# Patient Record
Sex: FEMALE | Race: BLACK OR AFRICAN AMERICAN | Employment: UNEMPLOYED | URBAN - METROPOLITAN AREA
[De-identification: names, ages, dates, MRNs, and addresses within clinical notes are randomized per-mention and may not be internally consistent; named-entity substitution may affect disease eponyms.]

---

## 2018-07-24 PROCEDURE — 02HV33Z INSERTION OF INFUSION DEVICE INTO SUPERIOR VENA CAVA, PERCUTANEOUS APPROACH: ICD-10-PCS | Performed by: EMERGENCY MEDICINE

## 2018-07-24 PROCEDURE — 99285 EMERGENCY DEPT VISIT HI MDM: CPT

## 2018-07-24 RX ORDER — SODIUM CHLORIDE 0.9 % (FLUSH) 0.9 %
5-10 SYRINGE (ML) INJECTION AS NEEDED
Status: DISCONTINUED | OUTPATIENT
Start: 2018-07-24 | End: 2018-07-25 | Stop reason: SDUPTHER

## 2018-07-24 RX ORDER — ACETAMINOPHEN 500 MG
1000 TABLET ORAL ONCE
Status: COMPLETED | OUTPATIENT
Start: 2018-07-24 | End: 2018-07-25

## 2018-07-25 ENCOUNTER — APPOINTMENT (OUTPATIENT)
Dept: CT IMAGING | Age: 73
DRG: 139 | End: 2018-07-25
Attending: EMERGENCY MEDICINE
Payer: MEDICAID

## 2018-07-25 ENCOUNTER — APPOINTMENT (OUTPATIENT)
Dept: GENERAL RADIOLOGY | Age: 73
DRG: 139 | End: 2018-07-25
Attending: EMERGENCY MEDICINE
Payer: MEDICAID

## 2018-07-25 ENCOUNTER — HOSPITAL ENCOUNTER (OUTPATIENT)
Age: 73
Setting detail: OBSERVATION
Discharge: HOME | DRG: 139 | End: 2018-07-28
Attending: EMERGENCY MEDICINE | Admitting: EMERGENCY MEDICINE
Payer: MEDICAID

## 2018-07-25 DIAGNOSIS — A41.9 SEPSIS, DUE TO UNSPECIFIED ORGANISM: Primary | ICD-10-CM

## 2018-07-25 DIAGNOSIS — N32.89 BLADDER WALL THICKENING: ICD-10-CM

## 2018-07-25 DIAGNOSIS — K83.8 PNEUMOBILIA: ICD-10-CM

## 2018-07-25 DIAGNOSIS — K86.9 PANCREATIC LESION: ICD-10-CM

## 2018-07-25 DIAGNOSIS — J18.1 LUNG CONSOLIDATION (HCC): ICD-10-CM

## 2018-07-25 DIAGNOSIS — N13.30 HYDRONEPHROSIS, UNSPECIFIED HYDRONEPHROSIS TYPE: ICD-10-CM

## 2018-07-25 DIAGNOSIS — D50.9 MICROCYTIC ANEMIA: ICD-10-CM

## 2018-07-25 PROBLEM — R56.9 SEIZURE (HCC): Status: ACTIVE | Noted: 2018-07-25

## 2018-07-25 LAB
ALBUMIN SERPL-MCNC: 3.2 G/DL (ref 3.5–5)
ALBUMIN/GLOB SERPL: 0.8 {RATIO} (ref 1.1–2.2)
ALP SERPL-CCNC: 69 U/L (ref 45–117)
ALT SERPL-CCNC: 13 U/L (ref 12–78)
AMPHET UR QL SCN: NEGATIVE
ANION GAP SERPL CALC-SCNC: 10 MMOL/L (ref 5–15)
APPEARANCE UR: CLEAR
AST SERPL-CCNC: 17 U/L (ref 15–37)
ATRIAL RATE: 75 BPM
BARBITURATES UR QL SCN: NEGATIVE
BASOPHILS # BLD: 0 K/UL (ref 0–0.1)
BASOPHILS NFR BLD: 0 % (ref 0–1)
BENZODIAZ UR QL: NEGATIVE
BILIRUB SERPL-MCNC: 0.6 MG/DL (ref 0.2–1)
BILIRUB UR QL: NEGATIVE
BUN SERPL-MCNC: 9 MG/DL (ref 6–20)
BUN/CREAT SERPL: 10 (ref 12–20)
CALCIUM SERPL-MCNC: 8.8 MG/DL (ref 8.5–10.1)
CALCULATED P AXIS, ECG09: 74 DEGREES
CALCULATED R AXIS, ECG10: -31 DEGREES
CALCULATED T AXIS, ECG11: 55 DEGREES
CANNABINOIDS UR QL SCN: NEGATIVE
CHLORIDE SERPL-SCNC: 100 MMOL/L (ref 97–108)
CO2 SERPL-SCNC: 27 MMOL/L (ref 21–32)
COCAINE UR QL SCN: NEGATIVE
COLOR UR: ABNORMAL
CREAT SERPL-MCNC: 0.86 MG/DL (ref 0.55–1.02)
DIAGNOSIS, 93000: NORMAL
DIFFERENTIAL METHOD BLD: ABNORMAL
DRUG SCRN COMMENT,DRGCM: NORMAL
EOSINOPHIL # BLD: 0 K/UL (ref 0–0.4)
EOSINOPHIL NFR BLD: 0 % (ref 0–7)
ERYTHROCYTE [DISTWIDTH] IN BLOOD BY AUTOMATED COUNT: 18.6 % (ref 11.5–14.5)
ETHANOL SERPL-MCNC: <10 MG/DL
FLUAV AG NPH QL IA: NEGATIVE
FLUBV AG NOSE QL IA: NEGATIVE
GLOBULIN SER CALC-MCNC: 3.9 G/DL (ref 2–4)
GLUCOSE SERPL-MCNC: 115 MG/DL (ref 65–100)
GLUCOSE UR STRIP.AUTO-MCNC: NEGATIVE MG/DL
HCT VFR BLD AUTO: 25.7 % (ref 35–47)
HEMOCCULT STL QL: NEGATIVE
HGB BLD-MCNC: 7.7 G/DL (ref 11.5–16)
HGB UR QL STRIP: NEGATIVE
IMM GRANULOCYTES # BLD: 0 K/UL (ref 0–0.04)
IMM GRANULOCYTES NFR BLD AUTO: 0 % (ref 0–0.5)
INR PPP: 1.1 (ref 0.9–1.1)
KETONES UR QL STRIP.AUTO: NEGATIVE MG/DL
LACTATE SERPL-SCNC: 0.9 MMOL/L (ref 0.4–2)
LACTATE SERPL-SCNC: 2.8 MMOL/L (ref 0.4–2)
LEUKOCYTE ESTERASE UR QL STRIP.AUTO: NEGATIVE
LYMPHOCYTES # BLD: 1.1 K/UL (ref 0.8–3.5)
LYMPHOCYTES NFR BLD: 14 % (ref 12–49)
MCH RBC QN AUTO: 20.8 PG (ref 26–34)
MCHC RBC AUTO-ENTMCNC: 30 G/DL (ref 30–36.5)
MCV RBC AUTO: 69.5 FL (ref 80–99)
METHADONE UR QL: NEGATIVE
MONOCYTES # BLD: 0.5 K/UL (ref 0–1)
MONOCYTES NFR BLD: 6 % (ref 5–13)
NEUTS SEG # BLD: 6.5 K/UL (ref 1.8–8)
NEUTS SEG NFR BLD: 80 % (ref 32–75)
NITRITE UR QL STRIP.AUTO: NEGATIVE
NRBC # BLD: 0 K/UL (ref 0–0.01)
NRBC BLD-RTO: 0 PER 100 WBC
OPIATES UR QL: NEGATIVE
P-R INTERVAL, ECG05: 154 MS
PCP UR QL: NEGATIVE
PH UR STRIP: 6.5 [PH] (ref 5–8)
PHENYTOIN SERPL-MCNC: <0.5 UG/ML (ref 10–20)
PLATELET # BLD AUTO: 259 K/UL (ref 150–400)
PMV BLD AUTO: 9 FL (ref 8.9–12.9)
POTASSIUM SERPL-SCNC: 3.3 MMOL/L (ref 3.5–5.1)
PROT SERPL-MCNC: 7.1 G/DL (ref 6.4–8.2)
PROT UR STRIP-MCNC: NEGATIVE MG/DL
PROTHROMBIN TIME: 10.9 SEC (ref 9–11.1)
Q-T INTERVAL, ECG07: 372 MS
QRS DURATION, ECG06: 78 MS
QTC CALCULATION (BEZET), ECG08: 415 MS
RBC # BLD AUTO: 3.7 M/UL (ref 3.8–5.2)
RBC MORPH BLD: ABNORMAL
RBC MORPH BLD: ABNORMAL
SODIUM SERPL-SCNC: 137 MMOL/L (ref 136–145)
SP GR UR REFRACTOMETRY: 1.01 (ref 1–1.03)
UROBILINOGEN UR QL STRIP.AUTO: 2 EU/DL (ref 0.2–1)
VENTRICULAR RATE, ECG03: 75 BPM
WBC # BLD AUTO: 8.1 K/UL (ref 3.6–11)

## 2018-07-25 PROCEDURE — 77030005563 HC CATH URETH INT MMGH -A

## 2018-07-25 PROCEDURE — 96372 THER/PROPH/DIAG INJ SC/IM: CPT

## 2018-07-25 PROCEDURE — 65270000032 HC RM SEMIPRIVATE

## 2018-07-25 PROCEDURE — 74011000250 HC RX REV CODE- 250: Performed by: SURGERY

## 2018-07-25 PROCEDURE — 85025 COMPLETE CBC W/AUTO DIFF WBC: CPT | Performed by: EMERGENCY MEDICINE

## 2018-07-25 PROCEDURE — 96365 THER/PROPH/DIAG IV INF INIT: CPT

## 2018-07-25 PROCEDURE — 80307 DRUG TEST PRSMV CHEM ANLYZR: CPT | Performed by: EMERGENCY MEDICINE

## 2018-07-25 PROCEDURE — 99218 HC RM OBSERVATION: CPT

## 2018-07-25 PROCEDURE — 83605 ASSAY OF LACTIC ACID: CPT | Performed by: EMERGENCY MEDICINE

## 2018-07-25 PROCEDURE — 96366 THER/PROPH/DIAG IV INF ADDON: CPT

## 2018-07-25 PROCEDURE — 74011636320 HC RX REV CODE- 636/320: Performed by: EMERGENCY MEDICINE

## 2018-07-25 PROCEDURE — 87086 URINE CULTURE/COLONY COUNT: CPT | Performed by: EMERGENCY MEDICINE

## 2018-07-25 PROCEDURE — 81003 URINALYSIS AUTO W/O SCOPE: CPT | Performed by: EMERGENCY MEDICINE

## 2018-07-25 PROCEDURE — 93005 ELECTROCARDIOGRAM TRACING: CPT

## 2018-07-25 PROCEDURE — 74011000258 HC RX REV CODE- 258: Performed by: EMERGENCY MEDICINE

## 2018-07-25 PROCEDURE — 74011250636 HC RX REV CODE- 250/636: Performed by: EMERGENCY MEDICINE

## 2018-07-25 PROCEDURE — 74011250637 HC RX REV CODE- 250/637: Performed by: HOSPITALIST

## 2018-07-25 PROCEDURE — 74177 CT ABD & PELVIS W/CONTRAST: CPT

## 2018-07-25 PROCEDURE — 75810000137 HC PLCMT CENT VENOUS CATH

## 2018-07-25 PROCEDURE — 85610 PROTHROMBIN TIME: CPT | Performed by: EMERGENCY MEDICINE

## 2018-07-25 PROCEDURE — 80177 DRUG SCRN QUAN LEVETIRACETAM: CPT | Performed by: EMERGENCY MEDICINE

## 2018-07-25 PROCEDURE — 82272 OCCULT BLD FECES 1-3 TESTS: CPT | Performed by: EMERGENCY MEDICINE

## 2018-07-25 PROCEDURE — 87040 BLOOD CULTURE FOR BACTERIA: CPT | Performed by: EMERGENCY MEDICINE

## 2018-07-25 PROCEDURE — 36556 INSERT NON-TUNNEL CV CATH: CPT

## 2018-07-25 PROCEDURE — C1751 CATH, INF, PER/CENT/MIDLINE: HCPCS

## 2018-07-25 PROCEDURE — 96367 TX/PROPH/DG ADDL SEQ IV INF: CPT

## 2018-07-25 PROCEDURE — 80053 COMPREHEN METABOLIC PANEL: CPT | Performed by: EMERGENCY MEDICINE

## 2018-07-25 PROCEDURE — 71045 X-RAY EXAM CHEST 1 VIEW: CPT

## 2018-07-25 PROCEDURE — 36415 COLL VENOUS BLD VENIPUNCTURE: CPT | Performed by: EMERGENCY MEDICINE

## 2018-07-25 PROCEDURE — 87804 INFLUENZA ASSAY W/OPTIC: CPT | Performed by: EMERGENCY MEDICINE

## 2018-07-25 PROCEDURE — 74011250637 HC RX REV CODE- 250/637: Performed by: EMERGENCY MEDICINE

## 2018-07-25 PROCEDURE — 51701 INSERT BLADDER CATHETER: CPT

## 2018-07-25 PROCEDURE — 70450 CT HEAD/BRAIN W/O DYE: CPT

## 2018-07-25 PROCEDURE — 96361 HYDRATE IV INFUSION ADD-ON: CPT

## 2018-07-25 PROCEDURE — 80339 ANTIEPILEPTICS NOS 1-3: CPT | Performed by: EMERGENCY MEDICINE

## 2018-07-25 PROCEDURE — 80185 ASSAY OF PHENYTOIN TOTAL: CPT | Performed by: EMERGENCY MEDICINE

## 2018-07-25 RX ORDER — LACOSAMIDE 50 MG/1
150 TABLET ORAL 2 TIMES DAILY
Status: DISCONTINUED | OUTPATIENT
Start: 2018-07-25 | End: 2018-07-28 | Stop reason: HOSPADM

## 2018-07-25 RX ORDER — SODIUM CHLORIDE 0.9 % (FLUSH) 0.9 %
5-10 SYRINGE (ML) INJECTION AS NEEDED
Status: DISCONTINUED | OUTPATIENT
Start: 2018-07-25 | End: 2018-07-28 | Stop reason: HOSPADM

## 2018-07-25 RX ORDER — OXYCODONE AND ACETAMINOPHEN 5; 325 MG/1; MG/1
1 TABLET ORAL
Status: DISCONTINUED | OUTPATIENT
Start: 2018-07-25 | End: 2018-07-28 | Stop reason: HOSPADM

## 2018-07-25 RX ORDER — METRONIDAZOLE 500 MG/100ML
500 INJECTION, SOLUTION INTRAVENOUS EVERY 12 HOURS
Status: DISCONTINUED | OUTPATIENT
Start: 2018-07-25 | End: 2018-07-27

## 2018-07-25 RX ORDER — ACETAMINOPHEN 325 MG/1
650 TABLET ORAL
Status: DISCONTINUED | OUTPATIENT
Start: 2018-07-25 | End: 2018-07-28 | Stop reason: HOSPADM

## 2018-07-25 RX ORDER — HEPARIN SODIUM 5000 [USP'U]/ML
5000 INJECTION, SOLUTION INTRAVENOUS; SUBCUTANEOUS EVERY 12 HOURS
Status: DISCONTINUED | OUTPATIENT
Start: 2018-07-25 | End: 2018-07-28 | Stop reason: HOSPADM

## 2018-07-25 RX ORDER — LEVETIRACETAM 500 MG/1
1500 TABLET ORAL 2 TIMES DAILY
Status: DISCONTINUED | OUTPATIENT
Start: 2018-07-25 | End: 2018-07-28 | Stop reason: HOSPADM

## 2018-07-25 RX ORDER — GABAPENTIN 300 MG/1
300 CAPSULE ORAL 3 TIMES DAILY
Status: DISCONTINUED | OUTPATIENT
Start: 2018-07-25 | End: 2018-07-25

## 2018-07-25 RX ORDER — SODIUM CHLORIDE 0.9 % (FLUSH) 0.9 %
5-10 SYRINGE (ML) INJECTION EVERY 8 HOURS
Status: DISCONTINUED | OUTPATIENT
Start: 2018-07-25 | End: 2018-07-28 | Stop reason: HOSPADM

## 2018-07-25 RX ORDER — IBUPROFEN 400 MG/1
800 TABLET ORAL
Status: COMPLETED | OUTPATIENT
Start: 2018-07-25 | End: 2018-07-25

## 2018-07-25 RX ORDER — POTASSIUM CHLORIDE 750 MG/1
40 TABLET, FILM COATED, EXTENDED RELEASE ORAL
Status: COMPLETED | OUTPATIENT
Start: 2018-07-25 | End: 2018-07-25

## 2018-07-25 RX ADMIN — SODIUM CHLORIDE 1890 ML: 900 INJECTION, SOLUTION INTRAVENOUS at 01:54

## 2018-07-25 RX ADMIN — CEFTRIAXONE SODIUM 1 G: 1 INJECTION, POWDER, FOR SOLUTION INTRAMUSCULAR; INTRAVENOUS at 03:15

## 2018-07-25 RX ADMIN — VANCOMYCIN HYDROCHLORIDE 500 MG: 500 INJECTION, POWDER, LYOPHILIZED, FOR SOLUTION INTRAVENOUS at 10:24

## 2018-07-25 RX ADMIN — IOPAMIDOL 100 ML: 755 INJECTION, SOLUTION INTRAVENOUS at 10:00

## 2018-07-25 RX ADMIN — HEPARIN SODIUM 5000 UNITS: 5000 INJECTION INTRAVENOUS; SUBCUTANEOUS at 10:05

## 2018-07-25 RX ADMIN — LEVETIRACETAM 1500 MG: 500 TABLET, FILM COATED ORAL at 10:05

## 2018-07-25 RX ADMIN — LACOSAMIDE 150 MG: 50 TABLET, FILM COATED ORAL at 10:05

## 2018-07-25 RX ADMIN — Medication 10 ML: at 09:03

## 2018-07-25 RX ADMIN — ACETAMINOPHEN 1000 MG: 500 TABLET, FILM COATED ORAL at 00:20

## 2018-07-25 RX ADMIN — HEPARIN SODIUM 5000 UNITS: 5000 INJECTION INTRAVENOUS; SUBCUTANEOUS at 22:00

## 2018-07-25 RX ADMIN — VANCOMYCIN HYDROCHLORIDE 1000 MG: 1 INJECTION, POWDER, LYOPHILIZED, FOR SOLUTION INTRAVENOUS at 06:58

## 2018-07-25 RX ADMIN — Medication 10 ML: at 22:47

## 2018-07-25 RX ADMIN — CEFTRIAXONE SODIUM 1 G: 1 INJECTION, POWDER, FOR SOLUTION INTRAMUSCULAR; INTRAVENOUS at 23:03

## 2018-07-25 RX ADMIN — LACOSAMIDE 150 MG: 50 TABLET, FILM COATED ORAL at 18:05

## 2018-07-25 RX ADMIN — IBUPROFEN 800 MG: 400 TABLET ORAL at 06:08

## 2018-07-25 RX ADMIN — POTASSIUM CHLORIDE 40 MEQ: 750 TABLET, FILM COATED, EXTENDED RELEASE ORAL at 18:05

## 2018-07-25 RX ADMIN — LEVETIRACETAM 1500 MG: 500 TABLET, FILM COATED ORAL at 18:06

## 2018-07-25 RX ADMIN — VANCOMYCIN HYDROCHLORIDE 750 MG: 500 INJECTION, POWDER, LYOPHILIZED, FOR SOLUTION INTRAVENOUS at 23:54

## 2018-07-25 RX ADMIN — Medication 5 ML: at 14:00

## 2018-07-25 RX ADMIN — METRONIDAZOLE 500 MG: 500 INJECTION, SOLUTION INTRAVENOUS at 18:06

## 2018-07-25 NOTE — CONSULTS
Patient seen at request of Dr. Darby Dailey. Information obtained from patient, family, review of chart, Dr. Darby Dailey. Zeny Rodriguez is an 74 y.o. female who was recently admitted with fevers. Ms. Janice Nickerson was in her usual state of health until yesterday when began feeling ill. Febrile to 102.7 in ER. Associated shaking - Ms. Reyes has a h/o seizures and is on Keppra. She reports no abdominal pain. No nausea or vomitting. Denies hematemesis or coffee ground emesis. No diarrhea or blood per rectum. No dysuria, hematuria or pneumaturia. No chest pain or shortness of breath. CT Scan Head - No evidence of acute intracranial abnormality by this modality. Encephalomalacia in the left temporal and parietal lobes. The patient is status post left-sided craniotomy. Periventricular and subcortical white matter hypoattenuation is nonspecific, often associated with chronic microvascular ischemic change. CT Scan Abdomen/Pelvis with po/IV contrast - Bilateral hydroureteronephrosis. The bladder is decompressed, containing a  Cohn catheter. There is thickening of the bladder wall with a few locules of  gas in the bladder. Recommend clinical correlation for cystitis. Air-fluid levels within a heterogeneously attenuating gallbladder fossa. There is chain suture material adjacent to this finding suggesting possible  blind ending loop of bowel. There is pneumobilia. Postsurgical changes in small bowel in the left midabdomen. Moderate hiatal hernia. Fluid in the endometrial canal.  Ms. Carie Lee and her family report no previous abdominal procedures. Lactic Acid was 2.8 on admission and came down to 0.9. Started on Rocephin and Vancomycin. Allergies - Review of patient's allergies indicates no known allergies. Meds - Reviewed.     PMH -   Past Medical History:   Diagnosis Date    Anemia     Arthritis     Asthma     Headache     Seizures ([address]      PSH -   Past Surgical History:   Procedure Laterality Date    NEUROLOGICAL PROCEDURE UNLISTED       Fam Hx - History reviewed. No pertinent family history. Soc Hx -   Social History   Substance Use Topics    Smoking status: Heavy Tobacco Smoker     Packs/day: 0.00     Types: Cigarettes    Smokeless tobacco: Not on file    Alcohol use No     Patient is a well developed, well nourished female who appears uncomfortable. Visit Vitals    /76    Pulse 66    Temp 98.1 °F (36.7 °C)    Resp 16    Ht 5' 4.96\" (1.65 m)    Wt 126 lb 8 oz (57.4 kg)    SpO2 97%    BMI 21.08 kg/m2     HEENT: Anicteric. Neck: Supple without Lymphadenopathy. Cor: RRR. Lungs: Clear to auscultation bilaterally. Abd: Non distended. Soft. Tender to deep palpation in mid abdomen. No guarding or rebound. Ext: No edema. Right femoral central venous catheter.   Neuro: Grossly Non focal.     Labs -   Recent Results (from the past 24 hour(s))   EKG, 12 LEAD, INITIAL    Collection Time: 07/25/18 12:05 AM   Result Value Ref Range    Ventricular Rate 75 BPM    Atrial Rate 75 BPM    P-R Interval 154 ms    QRS Duration 78 ms    Q-T Interval 372 ms    QTC Calculation (Bezet) 415 ms    Calculated P Axis 74 degrees    Calculated R Axis -31 degrees    Calculated T Axis 55 degrees    Diagnosis       Sinus rhythm with marked sinus arrhythmia  Left axis deviation  Low voltage QRS limb leads  T wave abnormality, consider anterior ischemia  No previous ECGs available  Confirmed by Divine Langford ([id number]) on 7/25/2018 9:25:25 AM     LACTIC ACID    Collection Time: 07/25/18 12:13 AM   Result Value Ref Range    Lactic acid 2.8 (HH) 0.4 - 2.0 MMOL/L   DRUG SCREEN, URINE    Collection Time: 07/25/18  2:17 AM   Result Value Ref Range    AMPHETAMINES NEGATIVE  NEG      BARBITURATES NEGATIVE  NEG      BENZODIAZEPINES NEGATIVE  NEG      COCAINE NEGATIVE  NEG      METHADONE NEGATIVE  NEG      OPIATES NEGATIVE  NEG      PCP(PHENCYCLIDINE) NEGATIVE  NEG      THC (TH-CANNABINOL) NEGATIVE  NEG Drug screen comment (NOTE)    URINALYSIS W/ RFLX MICROSCOPIC    Collection Time: 07/25/18  2:17 AM   Result Value Ref Range    Color YELLOW/STRAW      Appearance CLEAR CLEAR      Specific gravity 1.015 1.003 - 1.030      pH (UA) 6.5 5.0 - 8.0      Protein NEGATIVE  NEG mg/dL    Glucose NEGATIVE  NEG mg/dL    Ketone NEGATIVE  NEG mg/dL    Bilirubin NEGATIVE  NEG      Blood NEGATIVE  NEG      Urobilinogen 2.0 (H) 0.2 - 1.0 EU/dL    Nitrites NEGATIVE  NEG      Leukocyte Esterase NEGATIVE  NEG     ETHYL ALCOHOL    Collection Time: 07/25/18  3:14 AM   Result Value Ref Range    ALCOHOL(ETHYL),SERUM <10 <10 MG/DL   PHENYTOIN    Collection Time: 07/25/18  3:14 AM   Result Value Ref Range    Phenytoin <0.5 (L) 10.0 - 20.0 ug/mL   PROTHROMBIN TIME + INR    Collection Time: 07/25/18  3:14 AM   Result Value Ref Range    INR 1.1 0.9 - 1.1      Prothrombin time 10.9 9.0 - 11.1 sec   CBC WITH AUTOMATED DIFF    Collection Time: 07/25/18  3:25 AM   Result Value Ref Range    WBC 8.1 3.6 - 11.0 K/uL    RBC 3.70 (L) 3.80 - 5.20 M/uL    HGB 7.7 (L) 11.5 - 16.0 g/dL    HCT 25.7 (L) 35.0 - 47.0 %    MCV 69.5 (L) 80.0 - 99.0 FL    MCH 20.8 (L) 26.0 - 34.0 PG    MCHC 30.0 30.0 - 36.5 g/dL    RDW 18.6 (H) 11.5 - 14.5 %    PLATELET 801 023 - 080 K/uL    MPV 9.0 8.9 - 12.9 FL    NRBC 0.0 0  WBC    ABSOLUTE NRBC 0.00 0.00 - 0.01 K/uL    NEUTROPHILS 80 (H) 32 - 75 %    LYMPHOCYTES 14 12 - 49 %    MONOCYTES 6 5 - 13 %    EOSINOPHILS 0 0 - 7 %    BASOPHILS 0 0 - 1 %    IMMATURE GRANULOCYTES 0 0.0 - 0.5 %    ABS. NEUTROPHILS 6.5 1.8 - 8.0 K/UL    ABS. LYMPHOCYTES 1.1 0.8 - 3.5 K/UL    ABS. MONOCYTES 0.5 0.0 - 1.0 K/UL    ABS. EOSINOPHILS 0.0 0.0 - 0.4 K/UL    ABS. BASOPHILS 0.0 0.0 - 0.1 K/UL    ABS. IMM.  GRANS. 0.0 0.00 - 0.04 K/UL    DF SMEAR SCANNED      RBC COMMENTS ANISOCYTOSIS  1+        RBC COMMENTS HYPOCHROMIA  1+       METABOLIC PANEL, COMPREHENSIVE    Collection Time: 07/25/18  3:25 AM   Result Value Ref Range    Sodium 137 136 - 145 mmol/L    Potassium 3.3 (L) 3.5 - 5.1 mmol/L    Chloride 100 97 - 108 mmol/L    CO2 27 21 - 32 mmol/L    Anion gap 10 5 - 15 mmol/L    Glucose 115 (H) 65 - 100 mg/dL    BUN 9 6 - 20 MG/DL    Creatinine 0.86 0.55 - 1.02 MG/DL    BUN/Creatinine ratio 10 (L) 12 - 20      GFR est AA >60 >60 ml/min/1.73m2    GFR est non-AA >60 >60 ml/min/1.73m2    Calcium 8.8 8.5 - 10.1 MG/DL    Bilirubin, total 0.6 0.2 - 1.0 MG/DL    ALT (SGPT) 13 12 - 78 U/L    AST (SGOT) 17 15 - 37 U/L    Alk. phosphatase 69 45 - 117 U/L    Protein, total 7.1 6.4 - 8.2 g/dL    Albumin 3.2 (L) 3.5 - 5.0 g/dL    Globulin 3.9 2.0 - 4.0 g/dL    A-G Ratio 0.8 (L) 1.1 - 2.2     OCCULT BLOOD, STOOL    Collection Time: 07/25/18  4:10 AM   Result Value Ref Range    Occult blood, stool NEGATIVE  NEG     LACTIC ACID    Collection Time: 07/25/18  6:14 AM   Result Value Ref Range    Lactic acid 0.9 0.4 - 2.0 MMOL/L   INFLUENZA A & B AG (RAPID TEST)    Collection Time: 07/25/18  7:18 AM   Result Value Ref Range    Influenza A Antigen NEGATIVE  NEG      Influenza B Antigen NEGATIVE  NEG       CT Scans - Reviewed. Imp: Pt. Is a 74 y.o. female with pneumobilia, bilateral hydroureteronephrosis on CT Scan and fevers. Plan: 1. Diet as tolerated. 2. Continue IV abx - Will add Flagyl to Ceftriaxone and Vancomycin. 3. Pneumobilia appears to be from a biliary-enteric anastomosis - patient and family give no h/o previous abdominal procedures. Will check MRCP to better evaluate anatomy. 4. Follow blood and Urine cultures. 5. DVT prophylaxis - SC Heparin as ordered. 6. Plans per Dr. Carlin Number. Following.

## 2018-07-25 NOTE — ED PROVIDER NOTES
EMERGENCY DEPARTMENT HISTORY AND PHYSICAL EXAM      Date: 7/24/2018  Patient Name: Nereyda Covarrubias    History of Presenting Illness     Chief Complaint   Patient presents with    Shaking     X 1 day, per EMS pt's family states the was shivering and is concerned for seizure activity     History Provided By: Patient and EMS    HPI: Nereyda Covarrubias, 74 y.o. female with PMHx significant for seizures, epilepsy, anemia, arthritis, and asthma, presents via EMS to the ED with cc of new onset shaking starting 1 day ago. EMS states that the pt's family reported that the pt had 3 seizures prior to EMS arrive on seen. EMS reports that the pt was actively shaking and moaning upon arrival. EMS states that the pt's family reports that the pt's shaking was a sz. EMS reports that the pt had a body temperature of 109.2 F, and currently presents to the ED with a body temperature of 102.7 F. EMS also reports that the pt's blood sugar was 132 and was SpO2 100% on RA. EMS states that the pt had Pt ambulates with a walker at baseline. Pt endorses taking Kepra. Pt specifically denies coughing. Pt does not present with any other complaints or associated sxs Pt endorses smoking tobacco and does not drink alcohol.     Chief Complaint: shaking  Duration: 1 day ago   Timing:  Acute  Location: general body  Quality: n/a  Severity: N/A  Modifying Factors: n/a  Associated Symptoms: denies any other associated signs or symptoms    HPI is limited due to the pt's minimal verbal status      PCP: Doug Neves MD    Current Facility-Administered Medications   Medication Dose Route Frequency Provider Last Rate Last Dose    sodium chloride (NS) flush 5-10 mL  5-10 mL IntraVENous PRN Anthony Murillo MD        sodium chloride 0.9 % bolus infusion 1,890 mL  30 mL/kg IntraVENous ONCE Anthony Murillo MD        cefTRIAXone (ROCEPHIN) 1 g in 0.9% sodium chloride (MBP/ADV) 50 mL  1 g IntraVENous Q24H Anthony Murillo MD         Current Outpatient Prescriptions   Medication Sig Dispense Refill    levETIRAcetam (KEPPRA) 750 mg tablet TAKE 2 TABLETS BY MOUTH EVERY 12 HOURS FOR 30 DAYS  6    gabapentin (NEURONTIN) 300 mg capsule Take 300 mg by mouth three (3) times daily.  lacosamide (VIMPAT) 150 mg tab tablet Take 150 mg by mouth two (2) times a day.  Walker misc Use to assist with gait 1 Each 0       Past History     Past Medical History:  Past Medical History:   Diagnosis Date    Anemia     Arthritis     Asthma     Headache     Seizures ([address]        Past Surgical History:  Past Surgical History:   Procedure Laterality Date    NEUROLOGICAL PROCEDURE UNLISTED         Family History:  No family history on file. Social History:  Social History   Substance Use Topics    Smoking status: Heavy Tobacco Smoker     Packs/day: 0.00     Types: Cigarettes    Smokeless tobacco: Not on file    Alcohol use No       Allergies:  No Known Allergies      Review of Systems   Review of Systems   Reason unable to perform ROS: pt is minimally verbal        Physical Exam   Physical Exam   Constitutional: She appears well-developed and well-nourished. HENT:   Head: Normocephalic and atraumatic. Eyes: Conjunctivae and EOM are normal.   Neck: Neck supple. Cardiovascular: Normal rate, regular rhythm and intact distal pulses. Pulmonary/Chest: Effort normal. No respiratory distress. Lungs clear anteriorly    Abdominal: Soft. There is no tenderness. Musculoskeletal: Normal range of motion. She exhibits no deformity. Neurological: She is alert. She displays tremor. A&O X2  Tremulous    Skin: Skin is warm and dry. Psychiatric: She has a normal mood and affect. Her behavior is normal. Thought content normal.   Vitals reviewed.       Diagnostic Study Results     Labs -     Recent Results (from the past 12 hour(s))   EKG, 12 LEAD, INITIAL    Collection Time: 07/25/18 12:05 AM   Result Value Ref Range    Ventricular Rate 75 BPM    Atrial Rate 75 BPM P-R Interval 154 ms    QRS Duration 78 ms    Q-T Interval 372 ms    QTC Calculation (Bezet) 415 ms    Calculated P Axis 74 degrees    Calculated R Axis -31 degrees    Calculated T Axis 55 degrees    Diagnosis       Sinus rhythm with marked sinus arrhythmia  Left axis deviation  T wave abnormality, consider anterior ischemia  Abnormal ECG  No previous ECGs available         Radiologic Studies -   CT HEAD WO CONT    (Results Pending)   XR CHEST PORT    (Results Pending)     CT Results  (Last 48 hours)    None        CXR Results  (Last 48 hours)    None            Medical Decision Making   I am the first provider for this patient. I reviewed the vital signs, available nursing notes, past medical history, past surgical history, family history and social history. Vital Signs-Reviewed the patient's vital signs. Patient Vitals for the past 12 hrs:   Temp Pulse Resp BP SpO2   07/24/18 2347 (!) 102.7 °F (39.3 °C) 80 24 118/68 100 %     Pulse Oximetry Analysis - 100% on RA    Cardiac Monitor:   Rate: 80 bpm  Rhythm: Normal Sinus Rhythm      EKG interpretation: (Preliminary)  Rhythm: normal sinus rhythm; and regular . Rate (approx.): 75; Axis: left axis deviation; TN interval: normal; QRS interval; ST/T wave: T wave inverted V1-V4; Other findings: abnormal ekg. No prior available for comparison    Records Reviewed: Nursing Notes and Old Medical Records    Provider Notes (Medical Decision Making):   DDx: recurrent sz, sepsis, uti, dehydration, electrolyte abnormality, urosepsis, pneumonia, intra-abdominal pathology, acute delirium, acute on chronic dementia    ED Course:   Initial assessment performed. The patients presenting problems have been discussed, and they are in agreement with the care plan formulated and outlined with them. I have encouraged them to ask questions as they arise throughout their visit.     Procedure Note - Central Line Placement:   3:05 AM  Performed by: Fiona Santos MD     I called her daughter before the procedure to obtain verbal consent. Patient also agreed. Immediately prior to the procedure, the patient was reevaluated and found suitable for the planned procedure and any planned medications. Immediately prior to the procedure a time out was called to verify the correct patient, procedure, equipment, staff, and marking as appropriate. Area was cleansed with Betadine and anesthetized with 3mLs of 1% lidocaine. Prepped and draped in sterile fashion. Landmarks identified. 16 gauge needle with triple lumen catheter was inserted into pt's Right without ultrasound guidance (bounding pulse palpated). Line sutured in place; sterile dressing applied. Position: Trendelenburg  Number of attempts: 1  Estimated blood loss: 1cc  The procedure took 16-30 minutes, and pt tolerated well. Critical Care Time:   90min    CRITICAL CARE NOTE :    4:51 AM      IMPENDING DETERIORATION -Metabolic    ASSOCIATED RISK FACTORS - Hypotension, Shock, Metabolic changes, Vascular Compromise and CNS Decompensation    MANAGEMENT- Bedside Assessment and Supervision of Care    INTERPRETATION -  Xrays, CT Scan, Blood Pressure and Cardiac Output Measures     INTERVENTIONS - vascular control and Metobolic interventions    CASE REVIEW - Hospitalist, Nursing and Family    TREATMENT RESPONSE -Improved    PERFORMED BY - Self        NOTES   :      I have spent 90 minutes of critical care time involved in lab review, consultations with specialist, family decision- making, bedside attention and documentation. During this entire length of time I was immediately available to the patient . Josh Polo MD      6:22 AM  Extensive admission discussion with Dr. Sarah Yee. CT findings discussed. Unclear source of sepsis at this point. 7:30 AM  Repeat lactate normal. Pt off the floor. Disposition:  admit    PLAN:  1. Current Discharge Medication List        2.    Follow-up Information     None        Return to ED if worse     Diagnosis     Clinical Impression:   1. Sepsis, due to unspecified organism ([address]    2. Lung consolidation ([address]    3. Microcytic anemia    4. Pancreatic lesion    5. Pneumobilia    6. Hydronephrosis, unspecified hydronephrosis type    7. Bladder wall thickening        Attestation: This note is prepared by Doug Ruvalcaba, acting as Scribe for MD Yazan De La Vega MD : The scribe's documentation has been prepared under my direction and personally reviewed by me in its entirety. I confirm that the note above accurately reflects all work, treatment, procedures, and medical decision making performed by me.

## 2018-07-25 NOTE — ED NOTES
Assisted Dr. Autumn Valentin with central line placement. Blood drawn for labs. IV fluids and Rocephin started.

## 2018-07-25 NOTE — IP AVS SNAPSHOT
[facility] 
 
 
 [address] Patient: Lili Santana MRN: [id number] ZANE:6[birth year]72[birth year]32 About your hospitalization You were admitted on:  July 25, 2018 You last received care in the:  [address] You were discharged on:  July 28, 2018 Why you were hospitalized Your primary diagnosis was:  Sepsis (Hcc) Your diagnoses also included:  Seizure (Hcc), Iron Deficiency Anemia Follow-up Information Follow up With Details Comments Contact Info Niki Moran MD On 8/2/2018 Your appointment time is 2:30pm.  [address] [facility] 
[phone] Your Scheduled Appointments Thursday August 02, 2018  2:30 PM EDT TRANSITIONAL CARE MANAGEMENT with Niki Moran MD  
[facility] ([facility]) [address] [place] [address],[address] [place] [id number]  
[phone] Discharge Orders None A check alfred indicates which time of day the medication should be taken. My Medications START taking these medications Instructions Each Dose to Equal  
 Morning Noon Evening Bedtime  
 ferrous sulfate 325 mg (65 mg iron) tablet Commonly known as:  Iron (Ferrous Sulfate) Take 1 Tab by mouth two (2) times a day. 325 mg CONTINUE taking these medications Instructions Each Dose to Equal  
 Morning Noon Evening Bedtime  
 levETIRAcetam 750 mg tablet Commonly known as:  KEPPRA TAKE 2 TABLETS BY MOUTH EVERY 12 HOURS FOR 30 DAYS  
     
  
   
   
  
   
  
 VIMPAT 150 mg Tab tablet Generic drug:  lacosamide Take 150 mg by mouth two (2) times a day. 150 mg Where to Get Your Medications These medications were sent to [address] [employer] [employer] [facility] [address]  [address] [facility], [address] [place] [address] Hours:  24-hours Phone:  [phone]  
  ferrous sulfate 325 mg (65 mg iron) tablet Discharge Instructions Iron Deficiency Anemia: Care Instructions Your Care Instructions Anemia means that you do not have enough red blood cells. Red blood cells carry oxygen around your body. When you have anemia, it can make you pale, weak, and tired. Many things can cause anemia. The most common cause is loss of blood. This can happen if you have heavy menstrual periods. It can also happen if you have bleeding in your stomach or bowel. You can also get anemia if you don't have enough iron in your diet or if it's hard for your body to absorb iron. In some cases, pregnancy causes anemia. That's because a pregnant woman needs more iron. Your doctor may do more tests to find the cause of your anemia. If a disease or other health problem is causing it, your doctor will treat that problem. It's important to follow up with your doctor to make sure that your iron level returns to normal. 
Follow-up care is a key part of your treatment and safety. Be sure to make and go to all appointments, and call your doctor if you are having problems. It's also a good idea to know your test results and keep a list of the medicines you take. How can you care for yourself at home? · If your doctor recommended iron pills, take them as directed. ¨ Try to take the pills on an empty stomach. You can do this about 1 hour before or 2 hours after meals. But you may need to take iron with food to avoid an upset stomach. ¨ Do not take antacids or drink milk or anything with caffeine within 2 hours of when you take your iron. They can keep your body from absorbing the iron well. ¨ Vitamin C helps your body absorb iron.  You may want to take iron pills with a glass of orange juice or some other food high in vitamin C. 
 ¨ Iron pills may cause stomach problems. These include heartburn, nausea, diarrhea, constipation, and cramps. It can help to drink plenty of fluids and include fruits, vegetables, and fiber in your diet. ¨ It's normal for iron pills to make your stool a greenish or grayish black. But internal bleeding can also cause dark stool. So it's important to tell your doctor about any color changes. ¨ Call your doctor if you think you are having a problem with your iron pills. Even after you start to feel better, it will take several months for your body to build up its supply of iron. ¨ If you miss a pill, don't take a double dose. ¨ Keep iron pills out of the reach of small children. Too much iron can be very dangerous. · Eat foods with a lot of iron. These include red meat, shellfish, poultry, and eggs. They also include beans, raisins, whole-grain bread, and leafy green vegetables. · Steam your vegetables. This is the best way to prepare them if you want to get as much iron as possible. · Be safe with medicines. Do not take nonsteroidal anti-inflammatory pain relievers unless your doctor tells you to. These include aspirin, naproxen (Aleve), and ibuprofen (Advil, Motrin). · Liquid iron can stain your teeth. But you can mix it with water or juice and drink it with a straw. Then it won't get on your teeth. When should you call for help? Call 911 anytime you think you may need emergency care. For example, call if: 
  · You passed out (lost consciousness).  
 Call your doctor now or seek immediate medical care if: 
  · You are short of breath.  
  · You are dizzy or light-headed, or you feel like you may faint.  
  · You have new or worse bleeding.  
 Watch closely for changes in your health, and be sure to contact your doctor if: 
  · You feel weaker or more tired than usual.  
  · You do not get better as expected. Where can you learn more? Go to http://katharina-cherry.info/. Enter F797 in the search box to learn more about \"Iron Deficiency Anemia: Care Instructions. \" Current as of: October 9, 2017 Content Version: 11.7 © 3095-7261 [employer]. Care instructions adapted under license by [employer] (which disclaims liability or warranty for this information). If you have questions about a medical condition or this instruction, always ask your healthcare professional. [address] any warranty or liability for your use of this information. Iron-Rich Diet: Care Instructions Your Care Instructions Your body needs iron to make hemoglobin. Hemoglobin is a substance in red blood cells that carries oxygen from the lungs to cells all through your body. If you do not get enough iron, your body makes fewer and smaller red blood cells. As a result, your body's cells may not get enough oxygen. Adult men need 8 milligrams of iron a day; adult women need 18 milligrams of iron a day. After menopause, women need 8 milligrams of iron a day. A pregnant woman needs 27 milligrams of iron a day. Infants and young children have higher iron needs relative to their size than other age groups. People who have lost blood because of ulcers or heavy menstrual periods may become very low in iron and may develop anemia. Most people can get the iron their bodies need by eating enough of certain iron-rich foods. Your doctor may recommend that you take an iron supplement along with eating an iron-rich diet. Follow-up care is a key part of your treatment and safety. Be sure to make and go to all appointments, and call your doctor if you are having problems. It's also a good idea to know your test results and keep a list of the medicines you take. How can you care for yourself at home? · Make iron-rich foods a part of your daily diet. Iron-rich foods include: ¨ All meats, such as chicken, beef, lamb, pork, fish, and shellfish.  Liver is especially high in iron. ¨ Leafy green vegetables. ¨ Raisins, peas, beans, lentils, barley, and eggs. ¨ Iron-fortified breakfast cereals. · Eat foods with vitamin C along with iron-rich foods. Vitamin C helps you absorb more iron from food. Drink a glass of orange juice or another citrus juice with your food. · Eat meat and vegetables or grains together. The iron in meat helps your body absorb the iron in other foods. Where can you learn more? Go to http://katharina-cherry.info/. Enter 0328 [id number] in the search box to learn more about \"Iron-Rich Diet: Care Instructions. \" Current as of: May 12, 2017 Content Version: 11.7 © 8102-3454 "[employer]. Care instructions adapted under license by [employer] (which disclaims liability or warranty for this information). If you have questions about a medical condition or this instruction, always ask your healthcare professional. [place] [address] any warranty or liability for your use of this information. [employer] Announcement We are excited to announce that we are making your provider's discharge notes available to you in [employer]. You will see these notes when they are completed and signed by the physician that discharged you from your recent hospital stay. If you have any questions or concerns about any information you see in [employer], please call the Health Information Department where you were seen or reach out to your Primary Care Provider for more information about your plan of care. Introducing [facility]! [employer] introduces [employer] patient portal. Now you can access parts of your medical record, email your doctor's office, and request medication refills online. 1. In your internet browser, go to [email or web address] 2. Click on the First Time User? Click Here link in the Sign In box. You will see the New Member Sign Up page. 3. Enter your [facility] Access Code exactly as it appears below. You will not need to use this code after youve completed the sign-up process. If you do not sign up before the expiration date, you must request a new code. · [facility] Access Code: [email or web address] Expires: 10/22/2018 11:47 PM 
 
4. Enter the last four digits of your Social Security Number (xxxx) and Date of Birth (mm/dd/yyyy) as indicated and click Submit. You will be taken to the next sign-up page. 5. Create a Desallt ID. This will be your [facility] login ID and cannot be changed, so think of one that is secure and easy to remember. 6. Create a [facility] password. You can change your password at any time. 7. Enter your Password Reset Question and Answer. This can be used at a later time if you forget your password. 8. Enter your e-mail address. You will receive e-mail notification when new information is available in [address] 9. Click Sign Up. You can now view and download portions of your medical record. 10. Click the Download Summary menu link to download a portable copy of your medical information. If you have questions, please visit the Frequently Asked Questions section of the [facility] website. Remember, [facility] is NOT to be used for urgent needs. For medical emergencies, dial 911. Now available from your iPhone and Android! Introducing Tristan Felipe As a [facility] patient, I wanted to make you aware of our electronic visit tool called Tristan Felipe. [facility] 24/7 allows you to connect within minutes with a medical provider 24 hours a day, seven days a week via a mobile device or tablet or logging into a secure website from your computer. You can access Tristan Felipe from anywhere in the [place].  
 
A virtual visit might be right for you when you have a simple condition and feel like you just dont want to get out of bed, or cant get away from work for an appointment, when your regular [facility] provider is not available (evenings, weekends or holidays), or when youre out of town and need minor care. Electronic visits cost only $49 and if the [facility] [facility] 24/7 provider determines a prescription is needed to treat your condition, one can be electronically transmitted to a nearby pharmacy*. Please take a moment to enroll today if you have not already done so. The enrollment process is free and takes just a few minutes. To enroll, please download the [employer]/Viblio víctor to your tablet or phone, or visit [email or web address] to enroll on your computer. And, as an [address] [place] [address] patient with a [employer] account, the results of your visits will be scanned into your electronic medical record and your primary care provider will be able to view the scanned results. We urge you to continue to see your regular [facility] provider for your ongoing medical care. And while your primary care provider may not be the one available when you seek a [facility] virtual visit, the peace of mind you get from getting a real diagnosis real time can be priceless. For more information on [facility], view our Frequently Asked Questions (FAQs) at [email or web address]. Sincerely, 
 
Semja Vickers MD 
Chief Medical Officer Zara Valadez *:  certain medications cannot be prescribed via [facility] Unresulted Labs-Please follow up with your PCP about these lab tests Order Current Status CULTURE, BLOOD Preliminary result CULTURE, BLOOD Preliminary result Providers Seen During Your Hospitalization Provider Specialty Primary office phone Liban Fernandes MD Emergency Medicine [phone] Kushal Hernandez MD Emergency Medicine [phone] Chidi Vuong MD Internal Medicine [phone] Your Primary Care Physician (PCP) Primary Care Physician Office Phone Office Fax [address] [place] [phone] You are allergic to the following No active allergies Recent Documentation Height Weight BMI OB Status Smoking Status 1.626 m 57.4 kg 21.71 kg/m2 Menopause Heavy Tobacco Smoker Emergency Contacts Name Discharge Info Relation Home Work Mobile Paula Reyes DISCHARGE CAREGIVER [3] Daughter [21]   [phone] Patient Belongings The following personal items are in your possession at time of discharge: 
  Dental Appliances: None  Visual Aid: Glasses      Home Medications: None   Jewelry: None  Clothing: Pants, Shirt, Undergarments    Other Valuables: None Please provide this summary of care documentation to your next provider. Signatures-by signing, you are acknowledging that this After Visit Summary has been reviewed with you and you have received a copy. Patient Signature:  ____________________________________________________________ Date:  ____________________________________________________________  
  
[facility] Provider Signature:  ____________________________________________________________ Date:  ____________________________________________________________

## 2018-07-25 NOTE — PROGRESS NOTES
9403) pt arrive to unit. Pt awake, asking for water. Pt not able to answer all admission questions, stated her daughter coming in later today.

## 2018-07-25 NOTE — ED NOTES
Received call from patient's daughters to inform ED staff of contact information. Both daughters, Alexandra Philippe and Cindy Silva, can be reached at [phone]. They also wanted to inform ED provider \"not to give her a central line because she clots\". Dr. Andria Tony made aware.

## 2018-07-25 NOTE — ED NOTES
Pt arrived via EMS with CC of Sx. Pt is hot to touch. Slightly postictal. Airway is patent. No nausea or vomiting at this time. Cap refill is 3 seconds. Emergency Department Nursing Plan of Care       The Nursing Plan of Care is developed from the Nursing assessment and Emergency Department Attending provider initial evaluation. The plan of care may be reviewed in the ED Provider note.     The Plan of Care was developed with the following considerations:   Patient / Family readiness to learn indicated by:verbalized understanding  Persons(s) to be included in education: patient  Barriers to Learning/Limitations:No    Signed     Demarcus Way RN    7/25/2018   12:40 AM

## 2018-07-25 NOTE — PROGRESS NOTES
[facility] Dosing Services: Antimicrobial Stewardship Daily Doc    Consult for antibiotic dosing of vancomycin by Dr. Roxann Jasso  Indication: bacteremia   Day of Therapy 1    Ht Readings from Last 1 Encounters:   07/25/18 165 cm (64.96\")        Wt Readings from Last 1 Encounters:   07/25/18 57.4 kg (126 lb 8 oz)       Vancomycin therapy:  Current maintenance dose: 1000 (mg) every 16 hours (frequency). Dose calculated to approximate a therapeutic trough of 15-20mcg/mL. Last trough level N/A  Plan for level / Adjustment in Therapy:decrease dose for weight & renal function to 750 mg IV Q16H  Dose administration notes:   N/A       Other Antimicrobial   (not dosed by pharmacist) CTX 1 G IV Q24H   Cultures 7/25 Bloodx2: pending  7/25 Urine: pending  7/25 Influenza: A&B (-)   Serum Creatinine Lab Results   Component Value Date/Time    Creatinine 0.86 07/25/2018 03:25 AM         Creatinine Clearance Estimated Creatinine Clearance: 50.8 mL/min (based on Cr of 0.86).      Temp Temp: 98.1 °F (36.7 °C)       WBC Lab Results   Component Value Date/Time    WBC 8.1 07/25/2018 03:25 AM        H/H Lab Results   Component Value Date/Time    HGB 7.7 (L) 07/25/2018 03:25 AM        Platelets    Lab Results   Component Value Date/Time    PLATELET 582 35/29/2837 03:25 AM            Pharmacist Darvin Kahn PHARMD, BCPS  Contact: [phone]

## 2018-07-25 NOTE — PROGRESS NOTES
IDR: Patient admitted sepsis. Patient somewhat poor historian. Flu swab negative. Waiting on Urine Culture. No Medical personnel witness a seizure. Per patient family patient was shaking. Hx of seizure. Dmitri Ang [facility] CM.

## 2018-07-25 NOTE — IP AVS SNAPSHOT
Kourtney [facility]arkins 
 
 
 [address] Patient: Dana Hewitt MRN: [id number] AAO:5/67/[birth year]35 A check alfred indicates which time of day the medication should be taken. My Medications START taking these medications Instructions Each Dose to Equal  
 Morning Noon Evening Bedtime  
 ferrous sulfate 325 mg (65 mg iron) tablet Commonly known as:  Iron (Ferrous Sulfate) Take 1 Tab by mouth two (2) times a day. 325 mg CONTINUE taking these medications Instructions Each Dose to Equal  
 Morning Noon Evening Bedtime  
 levETIRAcetam 750 mg tablet Commonly known as:  KEPPRA TAKE 2 TABLETS BY MOUTH EVERY 12 HOURS FOR 30 DAYS  
     
  
   
   
  
   
  
 VIMPAT 150 mg Tab tablet Generic drug:  lacosamide Take 150 mg by mouth two (2) times a day. 150 mg Where to Get Your Medications These medications were sent to [address] [place] [address] S. [address] [place] [address] [address]. [address] [place] [address], [address] [place], [place] [id number] Hours:  24-hours Phone:  [phone]  
  ferrous sulfate 325 mg (65 mg iron) tablet

## 2018-07-25 NOTE — H&P
Hospitalist Admission Note    NAME: Alexander Leyva   :  1945   MRN:  [id number]     Date/Time:  2018 9:08 AM    Patient PCP: Aiden Cruz MD  ______________________________________________________________________  Given the patient's current clinical presentation, I have a high level of concern for decompensation if discharged from the emergency department. Complex decision making was performed, which includes reviewing the patient's available past medical records, laboratory results, and x-ray films. My assessment of this patient's clinical condition and my plan of care is as follows. Assessment / Plan:    Fever, suspect early pneumonia vs cystitis  Bilateral hydroureteronephrosis  Mild lactic acidosis likely due to above, resolved with fluids  CTA bd/pelvis  No other s/s of sepsis  Sepsis work up initiated  -blood cx  -UA neg, CXR concerning for early pneumonia  -empiric abx  Seizure  -resume home meds  -records from [facility] neurology  Anemia  -stool occult neg  -anemia work up    Code Status: full  Surrogate Decision Maker: daughter    DVT Prophylaxis: lovenox  GI Prophylaxis: not indicated    Baseline: independent      Subjective:   CHIEF COMPLAINT: shaking for one day    HISTORY OF PRESENT ILLNESS:     Alexander Leyva is a 74 y.o. female with PMHx significant for seizures, epilepsy, anemia, arthritis, and asthma was brought to ER last night for above symptoms. Pt very lethargic, unable to provide good history. Information obtained from EMR and daughter. As per records pt was brought in by  EMS with cc of new onset shaking started 1 day ago. EMS states that the pt's family reported that the pt had 3 seizures prior to EMS arrive on seen. EMS reported that the pt was actively shaking and moaning upon arrival. EMS states that the pt's family reports that the pt's shaking was a seizure. Pt was febrile in .7 F.  EMS also reported that the pt's blood sugar was 132 and was SpO2 100% on RA. Initial blood work showed minimally elevated lactic acid that has resolved. Sepsis protocol initiated and pt is admitted. We were asked to admit for work up and evaluation of the above problems. Past Medical History:   Diagnosis Date    Anemia     Arthritis     Asthma     Headache     Seizures ([address]         Past Surgical History:   Procedure Laterality Date    NEUROLOGICAL PROCEDURE UNLISTED         Social History   Substance Use Topics    Smoking status: Heavy Tobacco Smoker     Packs/day: 0.00     Types: Cigarettes    Smokeless tobacco: Not on file    Alcohol use No        family history + htn  No Known Allergies     Prior to Admission medications    Medication Sig Start Date End Date Taking? Authorizing Provider   levETIRAcetam (KEPPRA) 750 mg tablet TAKE 2 TABLETS BY MOUTH EVERY 12 HOURS FOR 30 DAYS 8/29/16  Yes Historical Provider   gabapentin (NEURONTIN) 300 mg capsule Take 300 mg by mouth three (3) times daily. Yes Historical Provider   lacosamide (VIMPAT) 150 mg tab tablet Take 150 mg by mouth two (2) times a day. Yes Historical Provider       REVIEW OF SYSTEMS:     I am not able to complete the review of systems because:    The patient is intubated and sedated    The patient has altered mental status due to his acute medical problems    The patient has baseline aphasia from prior stroke(s)    The patient has baseline dementia and is not reliable historian    The patient is in acute medical distress and unable to provide information           Total of 12 systems reviewed as follows:       POSITIVE= underlined text  Negative = text not underlined  General:  fever, chills, sweats, generalized weakness, weight loss/gain,      loss of appetite   Eyes:    blurred vision, eye pain, loss of vision, double vision  ENT:    rhinorrhea, pharyngitis   Respiratory:   cough, sputum production, SOB, GREENE, wheezing, pleuritic pain   Cardiology:   chest pain, palpitations, orthopnea, PND, edema, syncope   Gastrointestinal:  abdominal pain , N/V, diarrhea, dysphagia, constipation, bleeding   Genitourinary:  frequency, urgency, dysuria, hematuria, incontinence   Muskuloskeletal :  arthralgia, myalgia, back pain  Hematology:  easy bruising, nose or gum bleeding, lymphadenopathy   Dermatological: rash, ulceration, pruritis, color change / jaundice  Endocrine:   hot flashes or polydipsia   Neurological:  headache, dizziness, confusion, focal weakness, paresthesia,     Speech difficulties, memory loss, gait difficulty  Psychological: Feelings of anxiety, depression, agitation    Objective:   VITALS:    Visit Vitals    /60 (BP 1 Location: Right arm, BP Patient Position: At rest)    Pulse 74    Temp 98.1 °F (36.7 °C)    Resp 20    Ht 5' 4.96\" (1.65 m)    Wt 57.4 kg (126 lb 8 oz)    SpO2 100%    BMI 21.08 kg/m2       PHYSICAL EXAM:    General:    Lethargic, wake up on verbal command, no distress, appears stated age. HEENT: Atraumatic, anicteric sclerae, pink conjunctivae     No oral ulcers, mucosa moist, throat clear, dentition fair  Neck:  Supple, symmetrical,  thyroid: non tender  Lungs:   Clear to auscultation bilaterally. No Wheezing or Rhonchi. No rales. Chest wall:  No tenderness  No Accessory muscle use. Heart:   Regular  rhythm,  No  murmur   No edema  Abdomen:   Soft, non-tender. Not distended. Bowel sounds normal  Extremities: No cyanosis. No clubbing,      Skin turgor normal, Capillary refill normal, Radial dial pulse 2+  Skin:     Not pale. Not Jaundiced  No rashes, deformed grown nails feet and hands  Psych:  Not depressed. Not anxious or agitated. Neurologic: EOMs intact. No facial asymmetry. No aphasia or slurred speech. Symmetrical strength, Sensation grossly intact.  Alert and oriented X 4.     _______________________________________________________________________  Care Plan discussed with:    Comments   Patient x    Family      RN x    Care Manager x Consultant:      _______________________________________________________________________  Expected  Disposition:   Home with Family x   HH/PT/OT/RN    SNF/LTC    [facility]AN    ________________________________________________________________________  TOTAL TIME:  79 Minutes    Critical Care Provided     Minutes non procedure based      Comments     Reviewed previous records   >50% of visit spent in counseling and coordination of care  Discussion with patient and/or family and questions answered       ________________________________________________________________________  Signed: Melvin Toro MD    Procedures: see electronic medical records for all procedures/Xrays and details which were not copied into this note but were reviewed prior to creation of Plan.     LAB DATA REVIEWED:    Recent Results (from the past 24 hour(s))   EKG, 12 LEAD, INITIAL    Collection Time: 07/25/18 12:05 AM   Result Value Ref Range    Ventricular Rate 75 BPM    Atrial Rate 75 BPM    P-R Interval 154 ms    QRS Duration 78 ms    Q-T Interval 372 ms    QTC Calculation (Bezet) 415 ms    Calculated P Axis 74 degrees    Calculated R Axis -31 degrees    Calculated T Axis 55 degrees    Diagnosis       Sinus rhythm with marked sinus arrhythmia  Left axis deviation  T wave abnormality, consider anterior ischemia  Abnormal ECG  No previous ECGs available     LACTIC ACID    Collection Time: 07/25/18 12:13 AM   Result Value Ref Range    Lactic acid 2.8 (HH) 0.4 - 2.0 MMOL/L   DRUG SCREEN, URINE    Collection Time: 07/25/18  2:17 AM   Result Value Ref Range    AMPHETAMINES NEGATIVE  NEG      BARBITURATES NEGATIVE  NEG      BENZODIAZEPINES NEGATIVE  NEG      COCAINE NEGATIVE  NEG      METHADONE NEGATIVE  NEG      OPIATES NEGATIVE  NEG      PCP(PHENCYCLIDINE) NEGATIVE  NEG      THC (TH-CANNABINOL) NEGATIVE  NEG      Drug screen comment (NOTE)    URINALYSIS W/ RFLX MICROSCOPIC    Collection Time: 07/25/18  2:17 AM   Result Value Ref Range    Color YELLOW/STRAW      Appearance CLEAR CLEAR      Specific gravity 1.015 1.003 - 1.030      pH (UA) 6.5 5.0 - 8.0      Protein NEGATIVE  NEG mg/dL    Glucose NEGATIVE  NEG mg/dL    Ketone NEGATIVE  NEG mg/dL    Bilirubin NEGATIVE  NEG      Blood NEGATIVE  NEG      Urobilinogen 2.0 (H) 0.2 - 1.0 EU/dL    Nitrites NEGATIVE  NEG      Leukocyte Esterase NEGATIVE  NEG     ETHYL ALCOHOL    Collection Time: 07/25/18  3:14 AM   Result Value Ref Range    ALCOHOL(ETHYL),SERUM <10 <10 MG/DL   PHENYTOIN    Collection Time: 07/25/18  3:14 AM   Result Value Ref Range    Phenytoin <0.5 (L) 10.0 - 20.0 ug/mL   PROTHROMBIN TIME + INR    Collection Time: 07/25/18  3:14 AM   Result Value Ref Range    INR 1.1 0.9 - 1.1      Prothrombin time 10.9 9.0 - 11.1 sec   CBC WITH AUTOMATED DIFF    Collection Time: 07/25/18  3:25 AM   Result Value Ref Range    WBC 8.1 3.6 - 11.0 K/uL    RBC 3.70 (L) 3.80 - 5.20 M/uL    HGB 7.7 (L) 11.5 - 16.0 g/dL    HCT 25.7 (L) 35.0 - 47.0 %    MCV 69.5 (L) 80.0 - 99.0 FL    MCH 20.8 (L) 26.0 - 34.0 PG    MCHC 30.0 30.0 - 36.5 g/dL    RDW 18.6 (H) 11.5 - 14.5 %    PLATELET 918 875 - 287 K/uL    MPV 9.0 8.9 - 12.9 FL    NRBC 0.0 0  WBC    ABSOLUTE NRBC 0.00 0.00 - 0.01 K/uL    NEUTROPHILS 80 (H) 32 - 75 %    LYMPHOCYTES 14 12 - 49 %    MONOCYTES 6 5 - 13 %    EOSINOPHILS 0 0 - 7 %    BASOPHILS 0 0 - 1 %    IMMATURE GRANULOCYTES 0 0.0 - 0.5 %    ABS. NEUTROPHILS 6.5 1.8 - 8.0 K/UL    ABS. LYMPHOCYTES 1.1 0.8 - 3.5 K/UL    ABS. MONOCYTES 0.5 0.0 - 1.0 K/UL    ABS. EOSINOPHILS 0.0 0.0 - 0.4 K/UL    ABS. BASOPHILS 0.0 0.0 - 0.1 K/UL    ABS. IMM.  GRANS. 0.0 0.00 - 0.04 K/UL    DF SMEAR SCANNED      RBC COMMENTS ANISOCYTOSIS  1+        RBC COMMENTS HYPOCHROMIA  1+       METABOLIC PANEL, COMPREHENSIVE    Collection Time: 07/25/18  3:25 AM   Result Value Ref Range    Sodium 137 136 - 145 mmol/L    Potassium 3.3 (L) 3.5 - 5.1 mmol/L    Chloride 100 97 - 108 mmol/L    CO2 27 21 - 32 mmol/L    Anion gap 10 5 - 15 mmol/L    Glucose 115 (H) 65 - 100 mg/dL    BUN 9 6 - 20 MG/DL    Creatinine 0.86 0.55 - 1.02 MG/DL    BUN/Creatinine ratio 10 (L) 12 - 20      GFR est AA >60 >60 ml/min/1.73m2    GFR est non-AA >60 >60 ml/min/1.73m2    Calcium 8.8 8.5 - 10.1 MG/DL    Bilirubin, total 0.6 0.2 - 1.0 MG/DL    ALT (SGPT) 13 12 - 78 U/L    AST (SGOT) 17 15 - 37 U/L    Alk.  phosphatase 69 45 - 117 U/L    Protein, total 7.1 6.4 - 8.2 g/dL    Albumin 3.2 (L) 3.5 - 5.0 g/dL    Globulin 3.9 2.0 - 4.0 g/dL    A-G Ratio 0.8 (L) 1.1 - 2.2     OCCULT BLOOD, STOOL    Collection Time: 07/25/18  4:10 AM   Result Value Ref Range    Occult blood, stool NEGATIVE  NEG     LACTIC ACID    Collection Time: 07/25/18  6:14 AM   Result Value Ref Range    Lactic acid 0.9 0.4 - 2.0 MMOL/L   INFLUENZA A & B AG (RAPID TEST)    Collection Time: 07/25/18  7:18 AM   Result Value Ref Range    Influenza A Antigen NEGATIVE  NEG      Influenza B Antigen NEGATIVE  NEG

## 2018-07-25 NOTE — PROGRESS NOTES
[facility] Admission Pharmacy Medication Reconciliation     Recommendations/Findings:   1) Please interpret this medication list with caution, patient is a poor historian. 2) Removed gabapentin. Total Time Spent: 10 minutes    Information obtained from:, RxQuery    Past Medical History/Disease States:  Past Medical History:   Diagnosis Date    Anemia     Arthritis     Asthma     Headache     Seizures ([address]          Patient allergies: Allergies as of 07/24/2018    (No Known Allergies)         Prior to Admission Medications   Prescriptions Last Dose Informant Patient Reported? Taking?   lacosamide (VIMPAT) 150 mg tab tablet Unknown at Unknown time Other Yes No   Sig: Take 150 mg by mouth two (2) times a day.    levETIRAcetam (KEPPRA) 750 mg tablet Unknown at Unknown time Other Yes No   Sig: TAKE 2 TABLETS BY MOUTH EVERY 12 HOURS FOR 30 DAYS      Facility-Administered Medications: None            Thank you,    Kyle Kulkarni, ROMELD, BCPS  Contact: [phone]

## 2018-07-25 NOTE — ED NOTES
Report given from night shift rn, jemma, and care assumed of pt. Pt repositioned in bed and ibarra catheter emptied. Pt reported that she was feeling \"a little better. \" updated on plan of care. Eric Fulton calling report to inpt unit at present. No si/s of acute distress. Call bell within reach.

## 2018-07-25 NOTE — ED NOTES
Multiple attempts at IV including attempts in the left and right EJ and by ultrasound without success.

## 2018-07-25 NOTE — PROGRESS NOTES
Pharmacy Dosing Services  Automatic adjustment of metronidazole  Prescriber: Randee Calloway  Indication: Sepsis  Day of therapy: 1    Order changed to metronidazole 500 mg IV every 12 hours. Intervention   1. Pharmacy will automatically change the dose of metronidazole to 500 mg every 12 hours for all indications except for the following:   a. C. difficile infection   b. CNS infections   c. Non-anaerobic infections (giardiasis, trichomonas, H pylori, etc)       Thank you,  Jose Bergeron, PharmD, BCPS  [phone]    Background (policy #[id number]): Metronidazole exhibits rapid concentration-dependent bactericidal activity similar to that of aminoglycosides and fluoroquinolones. To optimize bactericidal activity, dosing should aim to maximize peak concentrations (ideally 8 to 10 times the KIMBER). In addition, metronidazole displays a moderate post antibiotic effect which allows for continued killing of bacteria even when serum and tissue levels drop below the KIMBER of the infecting pathogen. Metronidazole is extensively distributed into various tissues and body fluids. It is hepatically metabolized to an active hydroxy-metabolite (exhibiting 65% activity of the parent drug) and a small percent to an inactive acid metabolite. The combined parent drug and active hydroxyl-metabolite have demonstrated additive or partial synergistic activity. The half-life of metronidazole is approximately 8 hours in normal renal function and the half-life of the active metabolite ranges from 8-19 hours. Metronidazole dosed at 500mg every 12 hours obtains peak serum levels of approximately 23ug/ml (more than 10 times the KIMBER for B. fragilis [1-2ug/ml]) and trough levels of about 7 ug/ml. Based on these pharmacokinetic and pharmacodynamic properties, dosing of metronidazole every 12 or every 24 hours is appropriate for the treatment of anaerobic infections.    For the treatment of C. difficile infection and non-anaerobic infections (e.g. giardiasis, amebiesis, trichomonas, H. pylori and Crohn's disease) the traditional dosing interval is appropriate.

## 2018-07-26 LAB
ANION GAP SERPL CALC-SCNC: 7 MMOL/L (ref 5–15)
BACTERIA SPEC CULT: NORMAL
BUN SERPL-MCNC: 6 MG/DL (ref 6–20)
BUN/CREAT SERPL: 8 (ref 12–20)
CALCIUM SERPL-MCNC: 8.2 MG/DL (ref 8.5–10.1)
CC UR VC: NORMAL
CHLORIDE SERPL-SCNC: 107 MMOL/L (ref 97–108)
CO2 SERPL-SCNC: 27 MMOL/L (ref 21–32)
CREAT SERPL-MCNC: 0.71 MG/DL (ref 0.55–1.02)
FERRITIN SERPL-MCNC: 9 NG/ML (ref 8–252)
GLUCOSE BLD STRIP.AUTO-MCNC: 117 MG/DL (ref 65–100)
GLUCOSE SERPL-MCNC: 96 MG/DL (ref 65–100)
HCT VFR BLD AUTO: 23.6 % (ref 35–47)
HGB BLD-MCNC: 7.1 G/DL (ref 11.5–16)
IRON SATN MFR SERPL: 7 % (ref 20–50)
IRON SERPL-MCNC: 23 UG/DL (ref 35–150)
LACOSAMIDE SERPL-MCNC: 5.1 UG/ML (ref 5–10)
POTASSIUM SERPL-SCNC: 3.3 MMOL/L (ref 3.5–5.1)
SERVICE CMNT-IMP: ABNORMAL
SERVICE CMNT-IMP: NORMAL
SODIUM SERPL-SCNC: 141 MMOL/L (ref 136–145)
TIBC SERPL-MCNC: 328 UG/DL (ref 250–450)

## 2018-07-26 PROCEDURE — 65270000032 HC RM SEMIPRIVATE

## 2018-07-26 PROCEDURE — 96366 THER/PROPH/DIAG IV INF ADDON: CPT

## 2018-07-26 PROCEDURE — 74011250637 HC RX REV CODE- 250/637: Performed by: HOSPITALIST

## 2018-07-26 PROCEDURE — 96372 THER/PROPH/DIAG INJ SC/IM: CPT

## 2018-07-26 PROCEDURE — 74011000258 HC RX REV CODE- 258: Performed by: EMERGENCY MEDICINE

## 2018-07-26 PROCEDURE — 36415 COLL VENOUS BLD VENIPUNCTURE: CPT | Performed by: HOSPITALIST

## 2018-07-26 PROCEDURE — 74011250636 HC RX REV CODE- 250/636: Performed by: EMERGENCY MEDICINE

## 2018-07-26 PROCEDURE — 74011000250 HC RX REV CODE- 250: Performed by: SURGERY

## 2018-07-26 PROCEDURE — 83540 ASSAY OF IRON: CPT | Performed by: HOSPITALIST

## 2018-07-26 PROCEDURE — 85018 HEMOGLOBIN: CPT | Performed by: HOSPITALIST

## 2018-07-26 PROCEDURE — 74011250637 HC RX REV CODE- 250/637: Performed by: EMERGENCY MEDICINE

## 2018-07-26 PROCEDURE — 99218 HC RM OBSERVATION: CPT

## 2018-07-26 PROCEDURE — 80048 BASIC METABOLIC PNL TOTAL CA: CPT | Performed by: HOSPITALIST

## 2018-07-26 PROCEDURE — 82728 ASSAY OF FERRITIN: CPT | Performed by: HOSPITALIST

## 2018-07-26 PROCEDURE — 82962 GLUCOSE BLOOD TEST: CPT

## 2018-07-26 RX ORDER — POTASSIUM CHLORIDE 750 MG/1
40 TABLET, FILM COATED, EXTENDED RELEASE ORAL
Status: COMPLETED | OUTPATIENT
Start: 2018-07-26 | End: 2018-07-26

## 2018-07-26 RX ADMIN — LACOSAMIDE 150 MG: 50 TABLET, FILM COATED ORAL at 08:15

## 2018-07-26 RX ADMIN — LACOSAMIDE 150 MG: 50 TABLET, FILM COATED ORAL at 17:25

## 2018-07-26 RX ADMIN — LEVETIRACETAM 1500 MG: 500 TABLET, FILM COATED ORAL at 19:30

## 2018-07-26 RX ADMIN — METRONIDAZOLE 500 MG: 500 INJECTION, SOLUTION INTRAVENOUS at 22:05

## 2018-07-26 RX ADMIN — HEPARIN SODIUM 5000 UNITS: 5000 INJECTION INTRAVENOUS; SUBCUTANEOUS at 22:06

## 2018-07-26 RX ADMIN — METRONIDAZOLE 500 MG: 500 INJECTION, SOLUTION INTRAVENOUS at 08:15

## 2018-07-26 RX ADMIN — LEVETIRACETAM 1500 MG: 500 TABLET, FILM COATED ORAL at 08:15

## 2018-07-26 RX ADMIN — VANCOMYCIN HYDROCHLORIDE 750 MG: 500 INJECTION, POWDER, LYOPHILIZED, FOR SOLUTION INTRAVENOUS at 15:00

## 2018-07-26 RX ADMIN — POTASSIUM CHLORIDE 40 MEQ: 750 TABLET, FILM COATED, EXTENDED RELEASE ORAL at 08:15

## 2018-07-26 RX ADMIN — Medication 10 ML: at 06:24

## 2018-07-26 RX ADMIN — Medication 10 ML: at 22:06

## 2018-07-26 RX ADMIN — HEPARIN SODIUM 5000 UNITS: 5000 INJECTION INTRAVENOUS; SUBCUTANEOUS at 08:15

## 2018-07-26 RX ADMIN — CEFTRIAXONE SODIUM 1 G: 1 INJECTION, POWDER, FOR SOLUTION INTRAMUSCULAR; INTRAVENOUS at 23:22

## 2018-07-26 RX ADMIN — Medication 10 ML: at 15:01

## 2018-07-26 NOTE — PROGRESS NOTES
Pt without complaints this shift , no seizure activity noted . Tolerate all meds  As prescribed . 0700 Bedside shift change report given to [id number] [place] [address]  (oncoming nurse) by me (offgoing nurse). Report given with SBAR, MAR and Recent Results.

## 2018-07-26 NOTE — PROGRESS NOTES
Bedside report received from [facility]  and care assumed. Report given with SBAR , recent labs, and MAR .   0630 Pt remains afebrile . oob to bathroom with minimal assistance . No complaints offered this shift .

## 2018-07-26 NOTE — PROGRESS NOTES
Reason for Admission: Sepsis  RRAT Score: 4  Plan for utilizing home health: Not at this time. Follow for on going assessment. Likelihood of Readmission: Low  Transition of Care Plan: CM reviewed chart. Patient verified name, address, and daughter new number. Patient somewhat poor historian state ask my daughter she will tell you. Patient and daughter share the same name Sveta Elam [phone]. Patient lives with daughter in apartment. Patient states she has a cane and walker at home. Patient insured with [employer] medicaid. Patient cant remember if she has Medicare. Dmitri Ang [facility] CM.

## 2018-07-26 NOTE — PROGRESS NOTES
Hospitalist Progress Note    NAME: Lauren Garcia   :  1945   MRN:  [id number]       Assessment / Plan:      Fever, suspect early pneumonia vs cystitis  Bilateral hydroureteronephrosis, likely chronic  Mild lactic acidosis likely due to above, resolved with fluids  CT Abd/pelvis Bilateral hydroureteronephrosis. The bladder is decompressed, containing a Cohn catheter. There is thickening of the bladder wall with a few locules of  gas in the bladder. Recommend clinical correlation for cystitis. Air-fluid levels within a heterogeneously attenuating gallbladder fossa. There is chain suture material adjacent to this finding suggesting possible blind ending loop of bowel. There is pneumobilia. Postsurgical changes in small bowel in the left midabdomen. Moderate hiatal hernia Fluid in the endometrial canal.  No other s/s of sepsis  Sepsis work up initiated  -blood cx neg so far  -UA neg, CXR concerning for early pneumonia  -empiric abx day 2  -appreciate surgery consult, plan was to do MRCP, could not get done   Seizure  -resume home meds  -records from [facility] neurology  Anemia  -stool occult neg  -anemia work up     Code Status: full  Surrogate Decision Maker: daughter     DVT Prophylaxis: lovenox  GI Prophylaxis: not indicated     Baseline: independent     Subjective:     Chief Complaint / Reason for Physician Visit  \"feeling better\". Discussed with RN events overnight. Review of Systems:  Symptom Y/N Comments  Symptom Y/N Comments   Fever/Chills y   Chest Pain n    Poor Appetite y   Edema n    Cough n   Abdominal Pain n    Sputum n   Joint Pain n    SOB/GREENE n   Pruritis/Rash     Nausea/vomit n   Tolerating PT/OT     Diarrhea    Tolerating Diet y    Constipation    Other       Could NOT obtain due to:      Objective:     VITALS:   Last 24hrs VS reviewed since prior progress note.  Most recent are:  Patient Vitals for the past 24 hrs:   Temp Pulse Resp BP SpO2   18 1507 98.4 °F (36.9 °C) 98 18 115/72 99 %   07/26/18 1130 98.1 °F (36.7 °C) 98 18 124/75 99 %   07/26/18 0733 98 °F (36.7 °C) 100 16 127/68 99 %   07/26/18 0413 98.3 °F (36.8 °C) 72 16 114/61 100 %   07/26/18 0012 98.4 °F (36.9 °C) 78 16 106/65 100 %   07/25/18 1930 98.3 °F (36.8 °C) 74 20 108/56 96 %       Intake/Output Summary (Last 24 hours) at 07/26/18 1733  Last data filed at 07/26/18 1707   Gross per 24 hour   Intake             1440 ml   Output             2575 ml   Net            -1135 ml        PHYSICAL EXAM:  General: Awake, Alert, cooperative, no acute distress    EENT:  EOMI. Anicteric sclerae. MMM  Resp:  CTA bilaterally, no wheezing or rales. No accessory muscle use  CV:  Regular  rhythm,  No edema  GI:  Soft, Non distended, Non tender.  +Bowel sounds  Neurologic:  Alert and oriented X 3, normal speech,   Psych:   Not anxious nor agitated  Skin:  No rashes. No jaundice    Reviewed most current lab test results and cultures  YES  Reviewed most current radiology test results   YES  Review and summation of old records today    NO  Reviewed patient's current orders and MAR    YES  PMH/ reviewed - no change compared to H&P  ________________________________________________________________________  Care Plan discussed with:    Comments   Patient x    Family      RN     Care Manager     Consultant  x surgery                     Multidiciplinary team rounds were held today with , nursing, pharmacist and clinical coordinator. Patient's plan of care was discussed; medications were reviewed and discharge planning was addressed.      ________________________________________________________________________  Total NON critical care TIME: 25   Minutes    Total CRITICAL CARE TIME Spent:   Minutes non procedure based      Comments   >50% of visit spent in counseling and coordination of care     ________________________________________________________________________  Kimberli Velasco MD     Procedures: see electronic medical records for all procedures/Xrays and details which were not copied into this note but were reviewed prior to creation of Plan. LABS:  I reviewed today's most current labs and imaging studies.   Pertinent labs include:  Recent Labs      07/26/18 0405 07/25/18 0325   WBC   --   8.1   HGB  7.1*  7.7*   HCT  23.6*  25.7*   PLT   --   259     Recent Labs      07/26/18 0405 07/25/18 0325 07/25/18 0314   NA  141  137   --    K  3.3*  3.3*   --    CL  107  100   --    CO2  27  27   --    GLU  96  115*   --    BUN  6  9   --    CREA  0.71  0.86   --    CA  8.2*  8.8   --    ALB   --   3.2*   --    TBILI   --   0.6   --    SGOT   --   17   --    ALT   --   13   --    INR   --    --   1.1       Signed: Shane Oro MD

## 2018-07-26 NOTE — PROGRESS NOTES
Ms. Baldev Hoffman feels better today. Tm 100.7 Tc 98 HR: 100 BP: 127/68 Resp Rate: 16 99% sat on room air. Intake/Output Summary (Last 24 hours) at 07/26/18 1255  Last data filed at 07/26/18 0556   Gross per 24 hour   Intake              790 ml   Output             2075 ml   Net            -1285 ml   Exam: Cor: RRR. Lungs: Bilateral breath sounds. Clear to auscultation. Abd: Soft. Non distended. Non tender. No guarding or rebound. Labs:   Recent Results (from the past 12 hour(s))   FERRITIN    Collection Time: 07/26/18  4:05 AM   Result Value Ref Range    Ferritin 9 8 - 252 NG/ML   IRON PROFILE    Collection Time: 07/26/18  4:05 AM   Result Value Ref Range    Iron 23 (L) 35 - 150 ug/dL    TIBC 328 250 - 450 ug/dL    Iron % saturation 7 (L) 20 - 50 %   HGB & HCT    Collection Time: 07/26/18  4:05 AM   Result Value Ref Range    HGB 7.1 (L) 11.5 - 16.0 g/dL    HCT 23.6 (L) 35.0 - 85.7 %   METABOLIC PANEL, BASIC    Collection Time: 07/26/18  4:05 AM   Result Value Ref Range    Sodium 141 136 - 145 mmol/L    Potassium 3.3 (L) 3.5 - 5.1 mmol/L    Chloride 107 97 - 108 mmol/L    CO2 27 21 - 32 mmol/L    Anion gap 7 5 - 15 mmol/L    Glucose 96 65 - 100 mg/dL    BUN 6 6 - 20 MG/DL    Creatinine 0.71 0.55 - 1.02 MG/DL    BUN/Creatinine ratio 8 (L) 12 - 20      GFR est AA >60 >60 ml/min/1.73m2    GFR est non-AA >60 >60 ml/min/1.73m2    Calcium 8.2 (L) 8.5 - 10.1 MG/DL   Unable to have MRCP due to possibility of aneurysm clips. Will hold off on further abdominal imaging for now. Diet as tolerated. Continue IV abx for now - Rocephin, Flagyl, Vancomycin. Remove ibarra. Plans per Dr. Montes De Oca Hemp. Following.

## 2018-07-27 LAB
HCT VFR BLD AUTO: 24 % (ref 35–47)
HGB BLD-MCNC: 7.1 G/DL (ref 11.5–16)
LEVETIRACETAM SERPL-MCNC: 16.4 UG/ML (ref 10–40)
POTASSIUM SERPL-SCNC: 3.5 MMOL/L (ref 3.5–5.1)
VANCOMYCIN SERPL-MCNC: 9.3 UG/ML

## 2018-07-27 PROCEDURE — 97116 GAIT TRAINING THERAPY: CPT | Performed by: PHYSICAL THERAPIST

## 2018-07-27 PROCEDURE — G8980 MOBILITY D/C STATUS: HCPCS | Performed by: PHYSICAL THERAPIST

## 2018-07-27 PROCEDURE — 74011250637 HC RX REV CODE- 250/637: Performed by: EMERGENCY MEDICINE

## 2018-07-27 PROCEDURE — 96366 THER/PROPH/DIAG IV INF ADDON: CPT

## 2018-07-27 PROCEDURE — 96372 THER/PROPH/DIAG INJ SC/IM: CPT

## 2018-07-27 PROCEDURE — 65270000032 HC RM SEMIPRIVATE

## 2018-07-27 PROCEDURE — G8979 MOBILITY GOAL STATUS: HCPCS | Performed by: PHYSICAL THERAPIST

## 2018-07-27 PROCEDURE — 97161 PT EVAL LOW COMPLEX 20 MIN: CPT | Performed by: PHYSICAL THERAPIST

## 2018-07-27 PROCEDURE — 80202 ASSAY OF VANCOMYCIN: CPT

## 2018-07-27 PROCEDURE — 85018 HEMOGLOBIN: CPT | Performed by: HOSPITALIST

## 2018-07-27 PROCEDURE — 74011250636 HC RX REV CODE- 250/636: Performed by: EMERGENCY MEDICINE

## 2018-07-27 PROCEDURE — 84132 ASSAY OF SERUM POTASSIUM: CPT | Performed by: HOSPITALIST

## 2018-07-27 PROCEDURE — G8978 MOBILITY CURRENT STATUS: HCPCS | Performed by: PHYSICAL THERAPIST

## 2018-07-27 PROCEDURE — 36415 COLL VENOUS BLD VENIPUNCTURE: CPT | Performed by: HOSPITALIST

## 2018-07-27 PROCEDURE — 99218 HC RM OBSERVATION: CPT

## 2018-07-27 PROCEDURE — 74011000250 HC RX REV CODE- 250: Performed by: SURGERY

## 2018-07-27 RX ADMIN — Medication 10 ML: at 16:03

## 2018-07-27 RX ADMIN — Medication 10 ML: at 06:08

## 2018-07-27 RX ADMIN — Medication 10 ML: at 21:51

## 2018-07-27 RX ADMIN — METRONIDAZOLE 500 MG: 500 INJECTION, SOLUTION INTRAVENOUS at 08:38

## 2018-07-27 RX ADMIN — VANCOMYCIN HYDROCHLORIDE 750 MG: 500 INJECTION, POWDER, LYOPHILIZED, FOR SOLUTION INTRAVENOUS at 08:38

## 2018-07-27 RX ADMIN — LEVETIRACETAM 1500 MG: 500 TABLET, FILM COATED ORAL at 17:25

## 2018-07-27 RX ADMIN — LACOSAMIDE 150 MG: 50 TABLET, FILM COATED ORAL at 08:38

## 2018-07-27 RX ADMIN — Medication 10 ML: at 16:04

## 2018-07-27 RX ADMIN — HEPARIN SODIUM 5000 UNITS: 5000 INJECTION INTRAVENOUS; SUBCUTANEOUS at 21:51

## 2018-07-27 RX ADMIN — Medication 10 ML: at 16:02

## 2018-07-27 RX ADMIN — LACOSAMIDE 150 MG: 50 TABLET, FILM COATED ORAL at 17:25

## 2018-07-27 RX ADMIN — LEVETIRACETAM 1500 MG: 500 TABLET, FILM COATED ORAL at 11:54

## 2018-07-27 RX ADMIN — HEPARIN SODIUM 5000 UNITS: 5000 INJECTION INTRAVENOUS; SUBCUTANEOUS at 08:39

## 2018-07-27 NOTE — PROGRESS NOTES
IDR: Patient ibarra discharged yesterday. Patient did void throughout the night per night shift. MRI cancel patient daughter confirm patient has clips in head. PT/OT consult possible discharged 1-2 days. Dmitri GONZALEZ KAYLEIGH.

## 2018-07-27 NOTE — PROGRESS NOTES
Bedside report received from [facility]  and care assumed. Report given with SBAR , recent labs  and MAR .   0300, frightened by thunder ,offered recliner to sit at nurses station till storm over emotional support given . Helen Lyons 0345 pt back in room . 0700 Bedside shift change report given to [facility]  (oncoming nurse) by me (offgoing nurse). Report given with SBAR, MAR and Recent Results.

## 2018-07-27 NOTE — PROGRESS NOTES
physical Therapy EVALUATION/DISCHARGE  Patient: Chapito Goodman (67 y.o. female)  Date: 7/27/2018  Primary Diagnosis: Sepsis ([facility] [address]        Precautions:      ASSESSMENT :  Based on the objective data described below, the patient presents at baseline for mobility. Patient sitting out in chair at start of session. Patient independent with sit to stand transfer. Patient uses a rolling walker as needed for ambulation at home. Patient with good family support. Patient ambulated 300 feet without assistive device. Stand-by assistance provided secondary for mild unsteadiness. Patient scored a 49/56 on MEDRANO balance outcome measure which does not denote increased risk for falls. Left rolling walker in room for ambulation as needed. Skilled acute care physical therapy is not indicated at this time. PLAN :  Discharge Recommendations: Home Health if desired  Further Equipment Recommendations for Discharge: None; patient has rolling walker at home     SUBJECTIVE:   Patient stated I go on walks with my daughters a lot.     OBJECTIVE DATA SUMMARY:   HISTORY:    Past Medical History:   Diagnosis Date    Anemia     Arthritis     Asthma     Headache     Seizures ([facility] [address]      Past Surgical History:   Procedure Laterality Date    NEUROLOGICAL PROCEDURE UNLISTED       Prior Level of Function/Home Situation: Patient resides in a one story home with her daughters. No steps to enter. Patient has a rolling walker at home which she uses as needed. Independent with ADLs. Goes to grocery store and on walks with daughters in the community.    Personal factors and/or comorbidities impacting plan of care:     Home Situation  Home Environment: Other (comment) (not sure)  One/Two Story Residence: One story  Living Alone: No  Support Systems: Child(yordan)  Patient Expects to be Discharged to[de-identified] Unknown  Current DME Used/Available at Home: Walker    EXAMINATION/PRESENTATION/DECISION MAKING:   Critical Behavior:  Neurologic State: Alert, Appropriate for age  Orientation Level: Oriented to person, Oriented to place, Oriented to situation  Cognition: Follows commands, Appropriate safety awareness     Hearing: Auditory  Auditory Impairment: None    Range Of Motion:  AROM: Within functional limits  PROM: Within functional limits     Strength:    Strength: Generally decreased, functional (LLE weaker than right)     Functional Mobility:  Bed Mobility:   Patient sitting out of bed in chair. Transfers:  Sit to Stand: Independent  Stand to Sit: Independent     Balance:   Sitting: Intact  Standing: Impaired; Without support  Standing - Static: Good  Standing - Dynamic : Fair     Ambulation/Gait Training:  Distance (ft): 300 Feet (ft)  Assistive Device: Gait belt  Ambulation - Level of Assistance: Stand-by assistance  Gait Description (WDL): Exceptions to WDL  Gait Abnormalities: Decreased step clearance  Base of Support: Narrowed  Speed/Cee: Pace decreased (<100 feet/min)  Step Length: Right shortened;Left shortened       Therapeutic Exercises:   Patient instructed on     Functional Measure:  Bingham Balance Test:    Sitting to Standin  Standing Unsupported: 4  Sitting with Back Unsupported: 4  Standing to Sittin  Transfers: 4  Standing Unsupported with Eyes Closed: 4  Standing Unsupported with Feet Together: 4  Reach Forward with Outstretched Arm: 4   Object: 4  Turn to Look Over Shoulders: 4  Turn 360 Degrees: 2  Alternate Foot on Step/Stool: 3  Standing Unsupported One Foot in Front: 2  Stand on One Le  Total: 49         56=Maximum possible score;   0-20=High fall risk  21-40=Moderate fall risk   41-56=Low fall risk     Bingham Balance Test and G-code impairment scale:  Percentage of Impairment CH    0%   CI    1-19% CJ    20-39% CK    40-59% CL    60-79% CM    80-99% CN     100%   Bingham   Score 0-56 56 45-55 34-44 23-33 12-22 1-11 0         G codes:   In compliance with CMSs Claims Based Outcome Reporting, the following G-code set was chosen for this patient based on their primary functional limitation being treated: The outcome measure chosen to determine the severity of the functional limitation was the MEDRANO with a score of 49/56 which was correlated with the impairment scale. ? Mobility - Walking and Moving Around:     - CURRENT STATUS: CI - 1%-19% impaired, limited or restricted    - GOAL STATUS: CI - 1%-19% impaired, limited or restricted    - D/C STATUS: CI - 1%-19% impaired, limited or restricted       Based on the above components, the patient evaluation is determined to be of the following complexity level: LOW     Pain:  Pain Scale 1: Numeric (0 - 10)  Pain Intensity 1: 0     Activity Tolerance:   Good  Please refer to the flowsheet for vital signs taken during this treatment. After treatment:   [x]   Patient left in no apparent distress sitting up in chair  []   Patient left in no apparent distress in bed  [x]   Call bell left within reach  [x]   Nursing notified  []   Caregiver present  []   Bed alarm activated    COMMUNICATION/EDUCATION:   Communication/Collaboration:  [x]   Fall prevention education was provided and the patient/caregiver indicated understanding. [x]   Patient/family have participated as able and agree with findings and recommendations. []   Patient is unable to participate in plan of care at this time.   Findings and recommendations were discussed with: Registered Nurse    Thank you for this referral.  Neli Tineo, PT   Time Calculation: 25 mins

## 2018-07-27 NOTE — PROGRESS NOTES
Hospitalist Progress Note    NAME: Daphnie Veliz   :  1945   MRN:  [id number]       Assessment / Plan:      Fever, suspect early pneumonia vs cystitis  Bilateral hydroureteronephrosis, likely chronic  Mild lactic acidosis likely due to above, resolved with fluids  CT Abd/pelvis Bilateral hydroureteronephrosis. The bladder is decompressed, containing a Cohn catheter. There is thickening of the bladder wall with a few locules of  gas in the bladder. Recommend clinical correlation for cystitis. Air-fluid levels within a heterogeneously attenuating gallbladder fossa. There is chain suture material adjacent to this finding suggesting possible blind ending loop of bowel. There is pneumobilia. Postsurgical changes in small bowel in the left midabdomen. Moderate hiatal hernia Fluid in the endometrial canal.  No other s/s of sepsis  Sepsis work up initiated  -blood cx neg so far  -UA neg, CXR concerning for early pneumonia  -empiric abx day 3  -appreciate surgery consult, plan was to do MRCP, could not get done because unable to clarify if pt has staples for brain aneurysm repair in past    Seizure  -resume home meds    Anemia  -stool occult neg  -anemia work up     PT/OT    Code Status: full  Surrogate Decision Maker: daughter     DVT Prophylaxis: lovenox  GI Prophylaxis: not indicated     Baseline: independent     Subjective:     Chief Complaint / Reason for Physician Visit  \"feeling much better\". Discussed with RN events overnight. Review of Systems:  Symptom Y/N Comments  Symptom Y/N Comments   Fever/Chills y   Chest Pain n    Poor Appetite y   Edema n    Cough n   Abdominal Pain n    Sputum n   Joint Pain n    SOB/GREENE n   Pruritis/Rash     Nausea/vomit n   Tolerating PT/OT     Diarrhea    Tolerating Diet y    Constipation    Other       Could NOT obtain due to:      Objective:     VITALS:   Last 24hrs VS reviewed since prior progress note.  Most recent are:  Patient Vitals for the past 24 hrs:   Temp Pulse Resp BP SpO2   07/27/18 0818 98.2 °F (36.8 °C) 93 18 139/81 98 %   07/27/18 0347 98.2 °F (36.8 °C) 86 18 127/78 100 %   07/26/18 2332 98.5 °F (36.9 °C) 72 16 128/76 100 %   07/26/18 1953 98.5 °F (36.9 °C) 98 18 133/66 100 %   07/26/18 1507 98.4 °F (36.9 °C) 98 18 115/72 99 %   07/26/18 1130 98.1 °F (36.7 °C) 98 18 124/75 99 %       Intake/Output Summary (Last 24 hours) at 07/27/18 0904  Last data filed at 07/27/18 0657   Gross per 24 hour   Intake             1000 ml   Output             4000 ml   Net            -3000 ml        PHYSICAL EXAM:  General: Awake, Alert, cooperative, no acute distress    EENT:  EOMI. Anicteric sclerae. MMM  Resp:  CTA bilaterally, no wheezing or rales. No accessory muscle use  CV:  Regular  rhythm,  No edema  GI:  Soft, Non distended, Non tender.  +Bowel sounds  Neurologic:  Alert and oriented X 3, normal speech,   Psych:   Not anxious nor agitated  Skin:  No rashes. No jaundice    Reviewed most current lab test results and cultures  YES  Reviewed most current radiology test results   YES  Review and summation of old records today    NO  Reviewed patient's current orders and MAR    YES  PMH/ reviewed - no change compared to H&P  ________________________________________________________________________  Care Plan discussed with:    Comments   Patient x    Family      RN     Care Manager     Consultant  x surgery                     Multidiciplinary team rounds were held today with , nursing, pharmacist and clinical coordinator. Patient's plan of care was discussed; medications were reviewed and discharge planning was addressed.      ________________________________________________________________________  Total NON critical care TIME: 25   Minutes    Total CRITICAL CARE TIME Spent:   Minutes non procedure based      Comments   >50% of visit spent in counseling and coordination of care     ________________________________________________________________________  [facility] Luana Sousa MD     Procedures: see electronic medical records for all procedures/Xrays and details which were not copied into this note but were reviewed prior to creation of Plan. LABS:  I reviewed today's most current labs and imaging studies.   Pertinent labs include:  Recent Labs      07/27/18 0326 07/26/18 0405 07/25/18 0325   WBC   --    --   8.1   HGB  7.1*  7.1*  7.7*   HCT  24.0*  23.6*  25.7*   PLT   --    --   259     Recent Labs      07/27/18 0326 07/26/18 0405 07/25/18 0325 07/25/18 0314   NA   --   141  137   --    K  3.5  3.3*  3.3*   --    CL   --   107  100   --    CO2   --   27  27   --    GLU   --   96  115*   --    BUN   --   6  9   --    CREA   --   0.71  0.86   --    CA   --   8.2*  8.8   --    ALB   --    --   3.2*   --    TBILI   --    --   0.6   --    SGOT   --    --   17   --    ALT   --    --   13   --    INR   --    --    --   1.1       Signed: Macy Lopez MD

## 2018-07-27 NOTE — INTERDISCIPLINARY ROUNDS
IDR with Dr. Darby Dailey (MD), Myke Granados (pharmacist), Hi Grey (), Brendon Loco (nurse manager), Neale Cushing (diabetes educator), Lonnie Hernández (RN), and Mason José (RN) to discuss plan of care including discontinue MRI, PT/OT

## 2018-07-27 NOTE — PROGRESS NOTES
Occupational Therapy  Order received and chart reviewed. Discussed with PT after evaluation and reported patient with good family support and close to baseline, feel full OT evaluation not indicated at this time. Per PT no home health needs. Thank you.    Cuba Frost, OTR/L

## 2018-07-27 NOTE — PROGRESS NOTES
0715) Bedside and Verbal shift change report given to KENRICK Funk and Sarah Ortiz RN (oncoming nurse) by Noreen Dumont RN (offgoing nurse). Report included the following information SBAR, Kardex, Intake/Output, MAR, Accordion, Recent Results, Med Rec Status and Cardiac Rhythm Sinus Arrhythmia. 1050) Assisted patient to the bathroom. Patient back in bed resting comfortably with the call bell within reach. 1500) Physical Therapy walking the patient in the hallway. 1640) Patient ambulating in the hallway with her walker. 1720) Son at the bedside. Updated him on the plan of care for his mom with her permission. 1910) Bedside and Verbal shift change report given to KENRICK Dillon (oncoming nurse) by KENRICK Funk (offgoing nurse). Report included the following information SBAR, Kardex, Procedure Summary, Intake/Output, MAR, Accordion, Recent Results, Med Rec Status and Cardiac Rhythm Sinus Arrhythmia.

## 2018-07-28 VITALS
DIASTOLIC BLOOD PRESSURE: 70 MMHG | RESPIRATION RATE: 18 BRPM | WEIGHT: 126.5 LBS | TEMPERATURE: 98.3 F | BODY MASS INDEX: 21.6 KG/M2 | HEART RATE: 71 BPM | SYSTOLIC BLOOD PRESSURE: 120 MMHG | OXYGEN SATURATION: 100 % | HEIGHT: 64 IN

## 2018-07-28 PROCEDURE — 96372 THER/PROPH/DIAG INJ SC/IM: CPT

## 2018-07-28 PROCEDURE — 99218 HC RM OBSERVATION: CPT

## 2018-07-28 PROCEDURE — 74011250637 HC RX REV CODE- 250/637: Performed by: EMERGENCY MEDICINE

## 2018-07-28 PROCEDURE — 74011250636 HC RX REV CODE- 250/636: Performed by: EMERGENCY MEDICINE

## 2018-07-28 RX ORDER — LANOLIN ALCOHOL/MO/W.PET/CERES
325 CREAM (GRAM) TOPICAL 2 TIMES DAILY
Qty: 90 TAB | Refills: 0 | Status: SHIPPED | OUTPATIENT
Start: 2018-07-28 | End: 2022-09-13 | Stop reason: SDUPTHER

## 2018-07-28 RX ADMIN — LEVETIRACETAM 1500 MG: 500 TABLET, FILM COATED ORAL at 09:49

## 2018-07-28 RX ADMIN — Medication 10 ML: at 06:12

## 2018-07-28 RX ADMIN — LACOSAMIDE 150 MG: 50 TABLET, FILM COATED ORAL at 09:49

## 2018-07-28 RX ADMIN — HEPARIN SODIUM 5000 UNITS: 5000 INJECTION INTRAVENOUS; SUBCUTANEOUS at 09:50

## 2018-07-28 NOTE — DISCHARGE INSTRUCTIONS
Iron Deficiency Anemia: Care Instructions  Your Care Instructions    Anemia means that you do not have enough red blood cells. Red blood cells carry oxygen around your body. When you have anemia, it can make you pale, weak, and tired. Many things can cause anemia. The most common cause is loss of blood. This can happen if you have heavy menstrual periods. It can also happen if you have bleeding in your stomach or bowel. You can also get anemia if you don't have enough iron in your diet or if it's hard for your body to absorb iron. In some cases, pregnancy causes anemia. That's because a pregnant woman needs more iron. Your doctor may do more tests to find the cause of your anemia. If a disease or other health problem is causing it, your doctor will treat that problem. It's important to follow up with your doctor to make sure that your iron level returns to normal.  Follow-up care is a key part of your treatment and safety. Be sure to make and go to all appointments, and call your doctor if you are having problems. It's also a good idea to know your test results and keep a list of the medicines you take. How can you care for yourself at home? · If your doctor recommended iron pills, take them as directed. ¨ Try to take the pills on an empty stomach. You can do this about 1 hour before or 2 hours after meals. But you may need to take iron with food to avoid an upset stomach. ¨ Do not take antacids or drink milk or anything with caffeine within 2 hours of when you take your iron. They can keep your body from absorbing the iron well. ¨ Vitamin C helps your body absorb iron. You may want to take iron pills with a glass of orange juice or some other food high in vitamin C.  ¨ Iron pills may cause stomach problems. These include heartburn, nausea, diarrhea, constipation, and cramps. It can help to drink plenty of fluids and include fruits, vegetables, and fiber in your diet.   ¨ It's normal for iron pills to make your stool a greenish or grayish black. But internal bleeding can also cause dark stool. So it's important to tell your doctor about any color changes. ¨ Call your doctor if you think you are having a problem with your iron pills. Even after you start to feel better, it will take several months for your body to build up its supply of iron. ¨ If you miss a pill, don't take a double dose. ¨ Keep iron pills out of the reach of small children. Too much iron can be very dangerous. · Eat foods with a lot of iron. These include red meat, shellfish, poultry, and eggs. They also include beans, raisins, whole-grain bread, and leafy green vegetables. · Steam your vegetables. This is the best way to prepare them if you want to get as much iron as possible. · Be safe with medicines. Do not take nonsteroidal anti-inflammatory pain relievers unless your doctor tells you to. These include aspirin, naproxen (Aleve), and ibuprofen (Advil, Motrin). · Liquid iron can stain your teeth. But you can mix it with water or juice and drink it with a straw. Then it won't get on your teeth. When should you call for help? Call 911 anytime you think you may need emergency care. For example, call if:    · You passed out (lost consciousness).    Call your doctor now or seek immediate medical care if:    · You are short of breath.     · You are dizzy or light-headed, or you feel like you may faint.     · You have new or worse bleeding.    Watch closely for changes in your health, and be sure to contact your doctor if:    · You feel weaker or more tired than usual.     · You do not get better as expected. Where can you learn more? Go to http://katharina-cherry.info/. Enter P801 in the search box to learn more about \"Iron Deficiency Anemia: Care Instructions. \"  Current as of: October 9, 2017  Content Version: 11.7  © 9173-9969 [employer].  Care instructions adapted under license by [employer] (which disclaims liability or warranty for this information). If you have questions about a medical condition or this instruction, always ask your healthcare professional. [address] any warranty or liability for your use of this information. Iron-Rich Diet: Care Instructions  Your Care Instructions    Your body needs iron to make hemoglobin. Hemoglobin is a substance in red blood cells that carries oxygen from the lungs to cells all through your body. If you do not get enough iron, your body makes fewer and smaller red blood cells. As a result, your body's cells may not get enough oxygen. Adult men need 8 milligrams of iron a day; adult women need 18 milligrams of iron a day. After menopause, women need 8 milligrams of iron a day. A pregnant woman needs 27 milligrams of iron a day. Infants and young children have higher iron needs relative to their size than other age groups. People who have lost blood because of ulcers or heavy menstrual periods may become very low in iron and may develop anemia. Most people can get the iron their bodies need by eating enough of certain iron-rich foods. Your doctor may recommend that you take an iron supplement along with eating an iron-rich diet. Follow-up care is a key part of your treatment and safety. Be sure to make and go to all appointments, and call your doctor if you are having problems. It's also a good idea to know your test results and keep a list of the medicines you take. How can you care for yourself at home? · Make iron-rich foods a part of your daily diet. Iron-rich foods include:  ¨ All meats, such as chicken, beef, lamb, pork, fish, and shellfish. Liver is especially high in iron. ¨ Leafy green vegetables. ¨ Raisins, peas, beans, lentils, barley, and eggs. ¨ Iron-fortified breakfast cereals. · Eat foods with vitamin C along with iron-rich foods. Vitamin C helps you absorb more iron from food.  Drink a glass of orange juice or another citrus juice with your food. · Eat meat and vegetables or grains together. The iron in meat helps your body absorb the iron in other foods. Where can you learn more? Go to http://katharina-cherry.info/. Enter 0328 [id number] in the search box to learn more about \"Iron-Rich Diet: Care Instructions. \"  Current as of: May 12, 2017  Content Version: 11.7  © 3512-6187 [employer]. Care instructions adapted under license by [employer] (which disclaims liability or warranty for this information). If you have questions about a medical condition or this instruction, always ask your healthcare professional. [address] any warranty or liability for your use of this information.

## 2018-07-28 NOTE — PROGRESS NOTES
DISCHARGE SUMMARY from Nurse    PATIENT INSTRUCTIONS:    Report the following to your doctor:    · Temperature over 100.5  · Nausea and vomiting lasting longer than 4 hours or if unable to take medications  · Any signs of decreased circulation or nerve impairment to any extremity: change in color, persistent  numbness, tingling, coldness or increase pain  · Any questions    What to do at Home:  Recommended activity: Activity as tolerated,     If you experience any of the following symptoms, please follow up with your doctor. *  Please give a list of your current medications to your Primary Care Provider. *  Please update this list whenever your medications are discontinued, doses are      changed, or new medications (including over-the-counter products) are added. *  Please carry medication information at all times in case of emergency situations. These are general instructions for a healthy lifestyle:    No smoking/ No tobacco products/ Avoid exposure to second hand smoke  Surgeon General's Warning:  Quitting smoking now greatly reduces serious risk to your health. Obesity, smoking, and sedentary lifestyle greatly increases your risk for illness    A healthy diet, regular physical exercise & weight monitoring are important for maintaining a healthy lifestyle    You may be retaining fluid if you have a history of heart failure or if you experience any of the following symptoms:  Weight gain of 3 pounds or more overnight or 5 pounds in a week, increased swelling in our hands or feet or shortness of breath while lying flat in bed. Please call your doctor as soon as you notice any of these symptoms; do not wait until your next office visit.     Recognize signs and symptoms of STROKE:    F-face looks uneven    A-arms unable to move or move unevenly    S-speech slurred or non-existent    T-time-call 911 as soon as signs and symptoms begin-DO NOT go       Back to bed or wait to see if you get better-TIME IS BRAIN. Warning Signs of HEART ATTACK     Call 911 if you have these symptoms:   Chest discomfort. Most heart attacks involve discomfort in the center of the chest that lasts more than a few minutes, or that goes away and comes back. It can feel like uncomfortable pressure, squeezing, fullness, or pain.  Discomfort in other areas of the upper body. Symptoms can include pain or discomfort in one or both arms, the back, neck, jaw, or stomach.  Shortness of breath with or without chest discomfort.  Other signs may include breaking out in a cold sweat, nausea, or lightheadedness. Don't wait more than five minutes to call 911 - MINUTES MATTER! Fast action can save your life. Calling 911 is almost always the fastest way to get lifesaving treatment. Emergency Medical Services staff can begin treatment when they arrive -- up to an hour sooner than if someone gets to the hospital by car. The discharge information has been reviewed with the patient. The patient verbalized understanding. Discharge medications reviewed with the patient and appropriate educational materials and side effects teaching were provided.   ___________________________________________________________________________________________________________________________________

## 2018-07-28 NOTE — PROGRESS NOTES
Bedside shift change report given to me (oncoming nurse) by Casey Vuong (offgoing nurse). Report included the following information SBAR, Kardex, Intake/Output, MAR, Accordion and Recent Results.

## 2018-07-28 NOTE — PROGRESS NOTES
Problem: Falls - Risk of  Goal: *Absence of Falls  Document Daniela Fall Risk and appropriate interventions in the flowsheet. Outcome: Progressing Towards Goal  Fall Risk Interventions:  Mobility Interventions: Utilize walker, cane, or other assistive device    Mentation Interventions: Door open when patient unattended    Medication Interventions: Teach patient to arise slowly                  Problem: Pressure Injury - Risk of  Goal: *Prevention of pressure injury  Document Derek Scale and appropriate interventions in the flowsheet.    Outcome: Progressing Towards Goal  Pressure Injury Interventions:  Sensory Interventions: Assess changes in LOC    Moisture Interventions: Limit adult briefs    Activity Interventions: Increase time out of bed    Mobility Interventions: PT/OT evaluation    Nutrition Interventions: Offer support with meals,snacks and hydration    Friction and Shear Interventions: Apply protective barrier, creams and emollients

## 2018-07-28 NOTE — DISCHARGE SUMMARY
Hospitalist Discharge Summary     Patient ID:  Angela Galeano  [id number]  74 y.o.  1945    PCP on record: Brittney Torre MD    Admit date: 7/25/2018  Discharge date and time: 7/28/2018      DISCHARGE DIAGNOSIS:    Fever, suspect early pneumonia vs cystitis  Bilateral hydroureteronephrosis, likely chronic  Mild lactic acidosis  Seizure  Anemia    CONSULTATIONS:  IP CONSULT TO GENERAL SURGERY    Excerpted HPI from H&P of Ben Bennett MD:  Angela Galeano is a 74 y.o. female with PMHx significant for seizures, epilepsy, anemia, arthritis, and asthma was brought to ER last night for above symptoms. Pt very lethargic, unable to provide good history. Information obtained from EMR and daughter. As per records pt was brought in by  EMS with cc of new onset shaking started 1 day ago. EMS states that the pt's family reported that the pt had 3 seizures prior to EMS arrive on seen. EMS reported that the pt was actively shaking and moaning upon arrival. EMS states that the pt's family reports that the pt's shaking was a seizure. Pt was febrile in .7 F. EMS also reported that the pt's blood sugar was 132 and was SpO2 100% on RA. Initial blood work showed minimally elevated lactic acid that has resolved. Sepsis protocol initiated and pt is admitted.      We were asked to admit for work up and evaluation of the above problems. ______________________________________________________________________  DISCHARGE SUMMARY/HOSPITAL COURSE:  for full details see H&P, daily progress notes, labs, consult notes. Fever, suspect early pneumonia vs cystitis  Bilateral hydroureteronephrosis, likely chronic  Mild lactic acidosis likely due to above, resolved with fluids  CT Abd/pelvis Bilateral hydroureteronephrosis. The bladder is decompressed, containing a Cohn catheter. There is thickening of the bladder wall with a few locules of  gas in the bladder. Recommend clinical correlation for cystitis. Air-fluid levels within a heterogeneously attenuating gallbladder fossa. There is chain suture material adjacent to this finding suggesting possible blind ending loop of bowel. There is pneumobilia. Postsurgical changes in small bowel in the left midabdomen. Moderate hiatal hernia Fluid in the endometrial canal.  No other s/s of sepsis  Sepsis work up initiated  -blood cx neg so far  -UA neg, CXR concerning for early pneumonia  -empiric abx day 3, blood cx, urins cx remianed neg and abx d/burton. She remains afberile off abx for 24 hours  -appreciate surgery consult, plan was to do MRCP, could not get done because unable to clarify if pt has staples for brain aneurysm repair in past     Seizure  -resume home meds     Anemia  -stool occult neg  -anemia work up  [employer]  PT/OT     Code Status: full  Surrogate Decision Maker: daughter, discussed pt's medical diagnosis, and prognosis with daughters. Pt needs outpt urology follow to find cause of hydroureteronephrosis. And repeat CBC for anemia. Also need surgery f/u with Dr. Maira Greco in 2-3 weeks. Baseline: independent    _______________________________________________________________________  Patient seen and examined by me on discharge day. Pertinent Findings:  Gen:    Not in distress  Chest: Clear lungs  CVS:   Regular rhythm. No edema  Abd:  Soft, not distended, not tender  Neuro:  Alert, cn 2-12 grossly intact  _______________________________________________________________________  DISCHARGE MEDICATIONS:   Current Discharge Medication List      START taking these medications    Details   ferrous sulfate (IRON, FERROUS SULFATE,) 325 mg (65 mg iron) tablet Take 1 Tab by mouth two (2) times a day. Qty: 90 Tab, Refills: 0         CONTINUE these medications which have NOT CHANGED    Details   levETIRAcetam (KEPPRA) 750 mg tablet TAKE 2 TABLETS BY MOUTH EVERY 12 HOURS FOR 30 DAYS  Refills: 6      lacosamide (VIMPAT) 150 mg tab tablet Take 150 mg by mouth two (2) times a day. My Recommended Diet, Activity, Wound Care, and follow-up labs are listed in the patient's Discharge Insturctions which I have personally completed and reviewed.     ______________________________________________________________________    Risk of deterioration: Moderate    Condition at Discharge:  Stable  ______________________________________________________________________    Disposition  Home with family, no needs  ______________________________________________________________________    Care Plan discussed with:   Patient, Family, RN, Care Manager, Consultant    ______________________________________________________________________    Code Status: Full Code  ______________________________________________________________________      Follow up with:   PCP : Samy Krishna MD  Follow-up Information     Follow up With Details Comments Contact Info    Samy Krishna MD On 8/2/2018 Your appointment time is 2:30pm.  [address] [phone]                Total time in minutes spent coordinating this discharge (includes going over instructions, follow-up, prescriptions, and preparing report for sign off to her PCP) :  35 minutes    Signed:  Elmira Myers MD

## 2018-07-30 ENCOUNTER — TELEPHONE (OUTPATIENT)
Dept: CASE MANAGEMENT | Age: 73
End: 2018-07-30

## 2018-07-30 LAB
BACTERIA SPEC CULT: NORMAL
BACTERIA SPEC CULT: NORMAL
SERVICE CMNT-IMP: NORMAL
SERVICE CMNT-IMP: NORMAL

## 2018-07-30 NOTE — TELEPHONE ENCOUNTER
Care Management Follow-up call  CM reviewed chart. CM called pt to discuss discharge plan. Pt received her discharge paperwork and was able to understand them. Pt did not have any questions regarding her discharge plan. Pt confirmed her appointment on 8/2/18 with PCP. Pt is aware of her diagnosis and which symptoms should prompt her to call the doctor.        [facility] Mirna MSW, QMHP

## 2018-08-06 ENCOUNTER — OFFICE VISIT (OUTPATIENT)
Dept: INTERNAL MEDICINE CLINIC | Age: 73
End: 2018-08-06

## 2018-08-06 VITALS
BODY MASS INDEX: 20.83 KG/M2 | WEIGHT: 122 LBS | OXYGEN SATURATION: 97 % | HEIGHT: 64 IN | RESPIRATION RATE: 14 BRPM | SYSTOLIC BLOOD PRESSURE: 114 MMHG | DIASTOLIC BLOOD PRESSURE: 70 MMHG | HEART RATE: 95 BPM | TEMPERATURE: 98.7 F

## 2018-08-06 DIAGNOSIS — N13.30 HYDRONEPHROSIS, UNSPECIFIED HYDRONEPHROSIS TYPE: ICD-10-CM

## 2018-08-06 DIAGNOSIS — D64.9 ANEMIA, UNSPECIFIED TYPE: ICD-10-CM

## 2018-08-06 DIAGNOSIS — R56.9 SEIZURE (HCC): Primary | ICD-10-CM

## 2018-08-06 DIAGNOSIS — K83.8 PNEUMOBILIA: ICD-10-CM

## 2018-08-06 NOTE — MR AVS SNAPSHOT
Katie Rod 
 
 
 [address] [place] [address],[address] [facility] [address] 
[phone] Patient: Stephanie May MRN: [id number] MRP:[id number] Visit Information Date & Time Provider Department Dept. Phone Encounter #  
 8/6/2018 11:15 AM Na Matthew MD [address] [facility] [phone] [id number] Follow-up Instructions Return in about 3 months (around 11/6/2018), or if symptoms worsen or fail to improve. Upcoming Health Maintenance Date Due DTaP/Tdap/Td series (1 - Tdap) 1/30/1964 ZOSTER VACCINE AGE 60> 11/30/2002 GLAUCOMA SCREENING Q2Y 1/30/2008 Bone Densitometry (Dexa) Screening 1/30/2008 Pneumococcal 65+ Low/Medium Risk (1 of 2 - PCV13) 1/30/2008 Influenza Age 5 to Adult 8/1/2018 Allergies as of 8/6/2018  Review Complete On: 8/6/2018 By: Na Matthew MD  
 No Known Allergies Current Immunizations  Never Reviewed No immunizations on file. Not reviewed this visit You Were Diagnosed With   
  
 Codes Comments Seizure ([address]    -  Primary ICD-10-CM: R56.9 ICD-9-CM: 780.39 Anemia, unspecified type     ICD-10-CM: D64.9 ICD-9-CM: 285.9 Pneumobilia     ICD-10-CM: K83.8 ICD-9-CM: 576.8 Hydronephrosis, unspecified hydronephrosis type     ICD-10-CM: N13.30 ICD-9-CM: 965 Vitals BP Pulse Temp Resp Height(growth percentile) Weight(growth percentile) 114/70 95 98.7 °F (37.1 °C) (Oral) 14 5' 4\" (1.626 m) 122 lb (55.3 kg) SpO2 BMI OB Status Smoking Status 97% 20.94 kg/m2 Menopause Heavy Tobacco Smoker Vitals History BMI and BSA Data Body Mass Index Body Surface Area  
 20.94 kg/m 2 1.58 m 2 Preferred Pharmacy Pharmacy Name Phone [id number]- [place] - [address] [phone] Your Updated Medication List  
  
   
 This list is accurate as of 8/6/18 12:10 PM.  Always use your most recent med list.  
  
  
  
  
 ferrous sulfate 325 mg (65 mg iron) tablet Commonly known as:  Iron (Ferrous Sulfate) Take 1 Tab by mouth two (2) times a day. levETIRAcetam 750 mg tablet Commonly known as:  KEPPRA TAKE 2 TABLETS BY MOUTH EVERY 12 HOURS FOR 30 DAYS  
  
 VIMPAT 150 mg Tab tablet Generic drug:  lacosamide Take 150 mg by mouth two (2) times a day. We Performed the Following REFERRAL TO GENERAL SURGERY [REF27 Custom] REFERRAL TO UROLOGY [CBU153 Custom] Follow-up Instructions Return in about 3 months (around 11/6/2018), or if symptoms worsen or fail to improve. Referral Information Referral ID Referred By Referred To  
  
 [id number] Woody Lezama, [address] [place] [address] Kimber Cain MD   
   [address] [place], [place] [id number] [facility], [address] Phone: [phone] Fax: [phone] Visits Status Start Date End Date 1 New Request 8/6/18 8/6/19 If your referral has a status of pending review or denied, additional information will be sent to support the outcome of this decision. Referral ID Referred By Referred To  
 [id number] [facility] Stacey Randolph [address] [place] [address], [address] Visits Status Start Date End Date 1 New Request 8/6/18 8/6/19 If your referral has a status of pending review or denied, additional information will be sent to support the outcome of this decision. Patient Instructions Zuznow Activation Thank you for requesting access to Zuznow. Please follow the instructions below to securely access and download your online medical record. Zuznow allows you to send messages to your doctor, view your test results, renew your prescriptions, schedule appointments, and more. How Do I Sign Up? 1. In your internet browser, go to [email or web address] 
 2. Click on the First Time User? Click Here link in the Sign In box. You will be redirect to the New Member Sign Up page. 3. Enter your OneID Access Code exactly as it appears below. You will not need to use this code after youve completed the sign-up process. If you do not sign up before the expiration date, you must request a new code. MyChart Access Code: Activation code not generated Current OneID Status: Patient Declined (This is the date your MyChart access code will ) 4. Enter the last four digits of your Social Security Number (xxxx) and Date of Birth (mm/dd/yyyy) as indicated and click Submit. You will be taken to the next sign-up page. 5. Create a iContactt ID. This will be your OneID login ID and cannot be changed, so think of one that is secure and easy to remember. 6. Create a OneID password. You can change your password at any time. 7. Enter your Password Reset Question and Answer. This can be used at a later time if you forget your password. 8. Enter your e-mail address. You will receive e-mail notification when new information is available in [address] 9. Click Sign Up. You can now view and download portions of your medical record. 10. Click the Download Summary menu link to download a portable copy of your medical information. Additional Information If you have questions, please visit the Frequently Asked Questions section of the OneID website at [email or web address] Remember, OneID is NOT to be used for urgent needs. For medical emergencies, dial 911. Please provide this summary of care documentation to your next provider. Your primary care clinician is listed as [facility]. If you have any questions after today's visit, please call [phone].

## 2018-08-06 NOTE — PROGRESS NOTES
Addy Hernandez is a 74 y.o. female and presents with Hospital Follow Up (sepsis)  . Subjective:  Seizure Review:  Patient presents for evaluation of seizures. Patient has been unstable, new seizures have reported. Patient has tolerated her medication thus far.she was seen in the hospital with her medication adjusted and she was released. She was admitted recently for sepsis and discharged. She was noted to have hydronephrosis    She also has seen general surgery and needs a follow up. Anemia  Patient presents for presents evaluation of anemia. Anemia was found in the past by routine CBC. It has been present for several months. Associated signs & symptoms: fatigue,there has been no recent episode of active bleeding reported    . Review of Systems  Constitutional: negative for fevers, chills, anorexia and weight loss  Eyes:   negative for visual disturbance and irritation  ENT:   negative for tinnitus,sore throat,nasal congestion,ear pains. hoarseness  Respiratory:  negative for cough, hemoptysis, dyspnea,wheezing  CV:   negative for chest pain, palpitations, lower extremity edema  GI:   negative for nausea, vomiting, diarrhea, abdominal pain,melena  Endo:               negative for polyuria,polydipsia,polyphagia,heat intolerance  Genitourinary: negative for frequency, dysuria and hematuria  Integument:  negative for rash and pruritus  Hematologic:  negative for easy bruising and gum/nose bleeding  Musculoskel: negative for myalgias, arthralgias, back pain, muscle weakness, joint pain  Neurological:  negative for headaches, dizziness, vertigo, memory problems and gait   Behavl/Psych: negative for feelings of anxiety, depression, mood changes    Past Medical History:   Diagnosis Date    Anemia     Arthritis     Asthma     Headache     Seizures ([address]      Past Surgical History:   Procedure Laterality Date    NEUROLOGICAL PROCEDURE UNLISTED       Social History     Social History    Marital status:      Spouse name: N/A    Number of children: N/A    Years of education: N/A     Social History Main Topics    Smoking status: Heavy Tobacco Smoker     Packs/day: 0.00     Types: Cigarettes    Smokeless tobacco: Never Used    Alcohol use No    Drug use: No    Sexual activity: Not Currently     Other Topics Concern    None     Social History Narrative     History reviewed. No pertinent family history. Current Outpatient Prescriptions   Medication Sig Dispense Refill    ferrous sulfate (IRON, FERROUS SULFATE,) 325 mg (65 mg iron) tablet Take 1 Tab by mouth two (2) times a day. 90 Tab 0    levETIRAcetam (KEPPRA) 750 mg tablet TAKE 2 TABLETS BY MOUTH EVERY 12 HOURS FOR 30 DAYS  6    lacosamide (VIMPAT) 150 mg tab tablet Take 150 mg by mouth two (2) times a day. No Known Allergies    Objective:  Visit Vitals    /70    Pulse 95    Temp 98.7 °F (37.1 °C) (Oral)    Resp 14    Ht 5' 4\" (1.626 m)    Wt 122 lb (55.3 kg)    SpO2 97%    BMI 20.94 kg/m2     Physical Exam:   General appearance - alert, well appearing, and in no distress  Mental status - alert, oriented to person, place, and time  EYE-BRENDA, EOMI, corneas normal, no foreign bodies  ENT-ENT exam normal, no neck nodes or sinus tenderness  Nose - normal and patent, no erythema, discharge or polyps  Mouth - mucous membranes moist, pharynx normal without lesions  Neck - supple, no significant adenopathy   Chest - clear to auscultation, no wheezes, rales or rhonchi, symmetric air entry   Heart - normal rate, regular rhythm, normal S1, S2, no murmurs, rubs, clicks or gallops   Abdomen - soft, nontender, nondistended, no masses or organomegaly  Lymph- no adenopathy palpable  Ext-peripheral pulses normal, no pedal edema, no clubbing or cyanosis  Skin-Warm and dry.  no hyperpigmentation, vitiligo, or suspicious lesions  Neuro -alert, oriented, normal speech, no focal findings or movement disorder noted  Neck-normal C-spine, no tenderness, full ROM without pain      Results for orders placed or performed during the hospital encounter of 07/25/18   CULTURE, BLOOD   Result Value Ref Range    Special Requests: NO SPECIAL REQUESTS      Culture result: NO GROWTH 5 DAYS     CULTURE, BLOOD   Result Value Ref Range    Special Requests: NO SPECIAL REQUESTS      Culture result: NO GROWTH 5 DAYS     CULTURE, URINE   Result Value Ref Range    Special Requests: NO SPECIAL REQUESTS      [place] Count <1,000 CFU/ML      Culture result: NO GROWTH 1 DAY     INFLUENZA A & B AG (RAPID TEST)   Result Value Ref Range    Influenza A Antigen NEGATIVE  NEG      Influenza B Antigen NEGATIVE  NEG     DRUG SCREEN, URINE   Result Value Ref Range    AMPHETAMINES NEGATIVE  NEG      BARBITURATES NEGATIVE  NEG      BENZODIAZEPINES NEGATIVE  NEG      COCAINE NEGATIVE  NEG      METHADONE NEGATIVE  NEG      OPIATES NEGATIVE  NEG      PCP(PHENCYCLIDINE) NEGATIVE  NEG      THC (TH-CANNABINOL) NEGATIVE  NEG      Drug screen comment (NOTE)    ETHYL ALCOHOL   Result Value Ref Range    ALCOHOL(ETHYL),SERUM <10 <10 MG/DL   LACTIC ACID   Result Value Ref Range    Lactic acid 2.8 (HH) 0.4 - 2.0 MMOL/L   URINALYSIS W/ RFLX MICROSCOPIC   Result Value Ref Range    Color YELLOW/STRAW      Appearance CLEAR CLEAR      Specific gravity 1.015 1.003 - 1.030      pH (UA) 6.5 5.0 - 8.0      Protein NEGATIVE  NEG mg/dL    Glucose NEGATIVE  NEG mg/dL    Ketone NEGATIVE  NEG mg/dL    Bilirubin NEGATIVE  NEG      Blood NEGATIVE  NEG      Urobilinogen 2.0 (H) 0.2 - 1.0 EU/dL    Nitrites NEGATIVE  NEG      Leukocyte Esterase NEGATIVE  NEG     PHENYTOIN   Result Value Ref Range    Phenytoin <0.5 (L) 10.0 - 20.0 ug/mL   PROTHROMBIN TIME + INR   Result Value Ref Range    INR 1.1 0.9 - 1.1      Prothrombin time 10.9 9.0 - 11.1 sec   CBC WITH AUTOMATED DIFF   Result Value Ref Range    WBC 8.1 3.6 - 11.0 K/uL    RBC 3.70 (L) 3.80 - 5.20 M/uL    HGB 7.7 (L) 11.5 - 16.0 g/dL    HCT 25.7 (L) 35.0 - 47.0 %    MCV 69.5 (L) 80.0 - 99.0 FL    MCH 20.8 (L) 26.0 - 34.0 PG    MCHC 30.0 30.0 - 36.5 g/dL    RDW 18.6 (H) 11.5 - 14.5 %    PLATELET 947 312 - 007 K/uL    MPV 9.0 8.9 - 12.9 FL    NRBC 0.0 0  WBC    ABSOLUTE NRBC 0.00 0.00 - 0.01 K/uL    NEUTROPHILS 80 (H) 32 - 75 %    LYMPHOCYTES 14 12 - 49 %    MONOCYTES 6 5 - 13 %    EOSINOPHILS 0 0 - 7 %    BASOPHILS 0 0 - 1 %    IMMATURE GRANULOCYTES 0 0.0 - 0.5 %    ABS. NEUTROPHILS 6.5 1.8 - 8.0 K/UL    ABS. LYMPHOCYTES 1.1 0.8 - 3.5 K/UL    ABS. MONOCYTES 0.5 0.0 - 1.0 K/UL    ABS. EOSINOPHILS 0.0 0.0 - 0.4 K/UL    ABS. BASOPHILS 0.0 0.0 - 0.1 K/UL    ABS. IMM. GRANS. 0.0 0.00 - 0.04 K/UL    DF SMEAR SCANNED      RBC COMMENTS ANISOCYTOSIS  1+        RBC COMMENTS HYPOCHROMIA  1+       METABOLIC PANEL, COMPREHENSIVE   Result Value Ref Range    Sodium 137 136 - 145 mmol/L    Potassium 3.3 (L) 3.5 - 5.1 mmol/L    Chloride 100 97 - 108 mmol/L    CO2 27 21 - 32 mmol/L    Anion gap 10 5 - 15 mmol/L    Glucose 115 (H) 65 - 100 mg/dL    BUN 9 6 - 20 MG/DL    Creatinine 0.86 0.55 - 1.02 MG/DL    BUN/Creatinine ratio 10 (L) 12 - 20      GFR est AA >60 >60 ml/min/1.73m2    GFR est non-AA >60 >60 ml/min/1.73m2    Calcium 8.8 8.5 - 10.1 MG/DL    Bilirubin, total 0.6 0.2 - 1.0 MG/DL    ALT (SGPT) 13 12 - 78 U/L    AST (SGOT) 17 15 - 37 U/L    Alk.  phosphatase 69 45 - 117 U/L    Protein, total 7.1 6.4 - 8.2 g/dL    Albumin 3.2 (L) 3.5 - 5.0 g/dL    Globulin 3.9 2.0 - 4.0 g/dL    A-G Ratio 0.8 (L) 1.1 - 2.2     LEVETIRACETAM (KEPPRA)   Result Value Ref Range    Levetiracetam (Keppra) 16.4 10.0 - 40.0 ug/mL   LACOSAMIDE   Result Value Ref Range    Lacosamide 5.1 5.0 - 10.0 ug/mL   OCCULT BLOOD, STOOL   Result Value Ref Range    Occult blood, stool NEGATIVE  NEG     LACTIC ACID   Result Value Ref Range    Lactic acid 0.9 0.4 - 2.0 MMOL/L   FERRITIN   Result Value Ref Range    Ferritin 9 8 - 252 NG/ML   IRON PROFILE   Result Value Ref Range    Iron 23 (L) 35 - 150 ug/dL    TIBC 328 250 - 450 ug/dL Iron % saturation 7 (L) 20 - 50 %   HGB & HCT   Result Value Ref Range    HGB 7.1 (L) 11.5 - 16.0 g/dL    HCT 23.6 (L) 35.0 - 10.8 %   METABOLIC PANEL, BASIC   Result Value Ref Range    Sodium 141 136 - 145 mmol/L    Potassium 3.3 (L) 3.5 - 5.1 mmol/L    Chloride 107 97 - 108 mmol/L    CO2 27 21 - 32 mmol/L    Anion gap 7 5 - 15 mmol/L    Glucose 96 65 - 100 mg/dL    BUN 6 6 - 20 MG/DL    Creatinine 0.71 0.55 - 1.02 MG/DL    BUN/Creatinine ratio 8 (L) 12 - 20      GFR est AA >60 >60 ml/min/1.73m2    GFR est non-AA >60 >60 ml/min/1.73m2    Calcium 8.2 (L) 8.5 - 10.1 MG/DL   VANCOMYCIN, RANDOM   Result Value Ref Range    Vancomycin, random 9.3 UG/ML   HGB & HCT   Result Value Ref Range    HGB 7.1 (L) 11.5 - 16.0 g/dL    HCT 24.0 (L) 35.0 - 47.0 %   POTASSIUM   Result Value Ref Range    Potassium 3.5 3.5 - 5.1 mmol/L   GLUCOSE, POC   Result Value Ref Range    Glucose (POC) 117 (H) 65 - 100 mg/dL    Performed by Arlene Boyd    EKG, 12 LEAD, INITIAL   Result Value Ref Range    Ventricular Rate 75 BPM    Atrial Rate 75 BPM    P-R Interval 154 ms    QRS Duration 78 ms    Q-T Interval 372 ms    QTC Calculation (Bezet) 415 ms    Calculated P Axis 74 degrees    Calculated R Axis -31 degrees    Calculated T Axis 55 degrees    Diagnosis       Sinus rhythm with marked sinus arrhythmia  Left axis deviation  Low voltage QRS limb leads  T wave abnormality, consider anterior ischemia  No previous ECGs available  Confirmed by Jass Fernando ([id number]) on 7/25/2018 9:25:25 AM         Assessment/Plan:    ICD-10-CM ICD-9-CM    1. Seizure ([address] R56.9 780.39    2. Anemia, unspecified type D64.9 285.9    3.  Pneumobilia K83.8 576.8 REFERRAL TO GENERAL SURGERY   4. Hydronephrosis, unspecified hydronephrosis type N13.30 591 REFERRAL TO UROLOGY     Orders Placed This Encounter    REFERRAL TO GENERAL SURGERY     Referral Priority:   Routine     Referral Type:   Consultation     Referral Reason:   Specialty Services Required Referred to Provider:   Monica Victor MD     Number of Visits Requested:   1    REFERRAL TO UROLOGY     Referral Priority:   Routine     Referral Type:   Consultation     Referral Reason:   Specialty Services Required     Referral Location:   [facility]     Referred to Provider:   Cee Estrada MD     Number of Visits Requested:   1     lose weight, increase physical activity, continue present plan, routine labs ordered,Take 81mg aspirin daily  Patient Instructions   MyChart Activation    Thank you for requesting access to [employer]. Please follow the instructions below to securely access and download your online medical record. [employer] allows you to send messages to your doctor, view your test results, renew your prescriptions, schedule appointments, and more. How Do I Sign Up? 1. In your internet browser, go to [email or web address]  2. Click on the First Time User? Click Here link in the Sign In box. You will be redirect to the New Member Sign Up page. 3. Enter your [employer] Access Code exactly as it appears below. You will not need to use this code after youve completed the sign-up process. If you do not sign up before the expiration date, you must request a new code. [employer] Access Code: Activation code not generated  Current [employer] Status: Patient Declined (This is the date your Vpont access code will )    4. Enter the last four digits of your Social Security Number (xxxx) and Date of Birth (mm/dd/yyyy) as indicated and click Submit. You will be taken to the next sign-up page. 5. Create a [employer] ID. This will be your [employer] login ID and cannot be changed, so think of one that is secure and easy to remember. 6. Create a [employer] password. You can change your password at any time. 7. Enter your Password Reset Question and Answer. This can be used at a later time if you forget your password. 8. Enter your e-mail address.  You will receive e-mail notification when new information is available in [address] 9. Click Sign Up. You can now view and download portions of your medical record. 10. Click the Download Summary menu link to download a portable copy of your medical information. Additional Information    If you have questions, please visit the Frequently Asked Questions section of the [employer] website at [email or web address] [email or web address]. [employer] (MXD3D). [employer]/Datacratict/. Remember, [employer] is NOT to be used for urgent needs. For medical emergencies, dial 911. Follow-up Disposition:  Return in about 3 months (around 11/6/2018), or if symptoms worsen or fail to improve. I have reviewed with the patient details of the assessment and plan and all questions were answered. Relevent patient education was performed    An After Visit Summary was printed and given to the patient.

## 2018-08-06 NOTE — PATIENT INSTRUCTIONS
[employer] Activation    Thank you for requesting access to [facility]. Please follow the instructions below to securely access and download your online medical record. [facility] allows you to send messages to your doctor, view your test results, renew your prescriptions, schedule appointments, and more. How Do I Sign Up? 1. In your internet browser, go to [email or web address]  2. Click on the First Time User? Click Here link in the Sign In box. You will be redirect to the New Member Sign Up page. 3. Enter your [facility] Access Code exactly as it appears below. You will not need to use this code after youve completed the sign-up process. If you do not sign up before the expiration date, you must request a new code. [facility] Access Code: Activation code not generated  Current [facility] Status: Patient Declined (This is the date your [facility] access code will )    4. Enter the last four digits of your Social Security Number (xxxx) and Date of Birth (mm/dd/yyyy) as indicated and click Submit. You will be taken to the next sign-up page. 5. Create a [facility] ID. This will be your [facility] login ID and cannot be changed, so think of one that is secure and easy to remember. 6. Create a [facility] password. You can change your password at any time. 7. Enter your Password Reset Question and Answer. This can be used at a later time if you forget your password. 8. Enter your e-mail address. You will receive e-mail notification when new information is available in [address] 9. Click Sign Up. You can now view and download portions of your medical record. 10. Click the Download Summary menu link to download a portable copy of your medical information. Additional Information    If you have questions, please visit the Frequently Asked Questions section of the [facility] website at [email or web address] Remember, [facility] is NOT to be used for urgent needs. For medical emergencies, dial 911.

## 2018-08-30 ENCOUNTER — OFFICE VISIT (OUTPATIENT)
Dept: SURGERY | Age: 73
End: 2018-08-30

## 2018-08-30 VITALS
OXYGEN SATURATION: 98 % | TEMPERATURE: 98 F | HEART RATE: 89 BPM | DIASTOLIC BLOOD PRESSURE: 72 MMHG | HEIGHT: 64 IN | RESPIRATION RATE: 16 BRPM | SYSTOLIC BLOOD PRESSURE: 118 MMHG | BODY MASS INDEX: 21.68 KG/M2 | WEIGHT: 127 LBS

## 2018-08-30 DIAGNOSIS — R56.9 SEIZURE (HCC): Primary | ICD-10-CM

## 2018-08-30 NOTE — PROGRESS NOTES
HISTORY OF PRESENT ILLNESS  Josias Vogel is a 74 y.o. female who returns for follow up after a recent admission for fever, bilateral hydroureteronephrosis and mild lactic acidosis. HPI Comments: Ms. Senait Albert was admitted on 7/25/2018 with fever, bilateral hydroureteronephrosis and mild lactic acidosis. CT Scan of the abdomen/pelvis with po/IV contrast at that time - Bilateral hydroureteronephrosis. The bladder is decompressed, containing a Cohn catheter. There is thickening of the bladder wall with a few locules of  gas in the bladder. Recommend clinical correlation for cystitis. Air-fluid levels within a heterogeneously attenuating gallbladder fossa. There is chain suture material adjacent to this finding suggesting possible blind ending loop of bowel. There is pneumobilia. Postsurgical changes in small bowel in the left midabdomen. Moderate hiatal hernia. Fluid in the endometrial canal. No acute process was noted on CT Scan of the head. Her hospital course was uneventful and she was discharged to home on 7/28/2018. Doing well since then. No complaints today. She has otherwise been in her usual state of health. Past Medical History:  No date: Anemia  No date: Arthritis  No date: Asthma  No date: Headache  No date: Seizures [facility])    Past Surgical History:  No date: NEUROLOGICAL PROCEDURE UNLISTED    History reviewed. No pertinent family history. Social History: Employment - Retired. Tobacco - Cigarettes, very rarely. EtOH - Denies. Review of systems negative except as noted. Hospital Follow Up   The history is provided by the patient. Pertinent negatives include no chest pain, no abdominal pain and no shortness of breath. Review of Systems   Constitutional: Negative for chills and fever. Respiratory: Negative for shortness of breath. Cardiovascular: Negative for chest pain. Gastrointestinal: Negative for abdominal pain, nausea and vomiting. Dark stool.    Genitourinary: Negative for dysuria and hematuria. No h/o tea colored urine. Neurological: Positive for dizziness and seizures. Physical Exam   Constitutional: She appears well-developed and well-nourished. No distress. HENT:   Head: Normocephalic and atraumatic. Eyes: No scleral icterus. Neck: Neck supple. Cardiovascular: Normal rate and regular rhythm. Pulmonary/Chest: Effort normal and breath sounds normal.   Abdominal: Soft. She exhibits no distension. There is no tenderness. There is no rebound and no guarding. Musculoskeletal: Normal range of motion. Lymphadenopathy:     She has no cervical adenopathy. Neurological: She is alert. Skin:   Well healed RUQ abdominal scar. Vitals reviewed. ASSESSMENT and PLAN  Reassured Ms. Reyes that she is doing well and that at this point in time, do not believe that there is a need for further abdominal imaging. In view of RUQ scar and findings on CT Scan, Ms. Dmitri Pride most likely has a biliary enteric anastomosis following cholecystectomy in the past which would explain the pneumobilia on CT Scan. In terms of her dark stool, will ask GI to see. Follow up with Dr. Talon Teague as scheduled. Will see as needed.      CC: Rosina Lew., MD

## 2018-08-30 NOTE — MR AVS SNAPSHOT
[address] [place] [address] [address] [id number] 
[phone] Patient: Mona Michel MRN: [id number] JOK:[id number] Visit Information Date & Time Provider Department Dept. Phone Encounter #  
 8/30/2018  1:30 PM Karen Lyon MD [facility] [id number] [facility] [place] [id number] Your Appointments 11/12/2018 10:30 AM  
ROUTINE CARE with Celine Ibarra MD  
[facility] - [address] [place] [address] ([facility]) Appt Note: 3 Month follow-Up  
 [address] [facility] [place] [id number]  
[phone]  
  
   
 [address] [place] [id number] Upcoming Health Maintenance Date Due DTaP/Tdap/Td series (1 - Tdap) 1/30/1964 ZOSTER VACCINE AGE 60> 11/30/2002 GLAUCOMA SCREENING Q2Y 1/30/2008 Bone Densitometry (Dexa) Screening 1/30/2008 Pneumococcal 65+ Low/Medium Risk (1 of 2 - PCV13) 1/30/2008 Influenza Age 5 to Adult 8/1/2018 Allergies as of 8/30/2018  Review Complete On: 8/30/2018 By: Evette Granados LPN No Known Allergies Current Immunizations  Never Reviewed No immunizations on file. Not reviewed this visit Vitals BP Pulse Temp Resp Height(growth percentile) Weight(growth percentile) 118/72 (BP 1 Location: Left arm, BP Patient Position: Sitting) 89 98 °F (36.7 °C) (Oral) 16 5' 4\" (1.626 m) 127 lb (57.6 kg) SpO2 BMI OB Status Smoking Status 98% 21.8 kg/m2 Menopause Heavy Tobacco Smoker BMI and BSA Data Body Mass Index Body Surface Area  
 21.8 kg/m 2 1.61 m 2 Preferred Pharmacy Pharmacy Name Phone [facility]ACY [id number], [place] - [address] [phone] Your Updated Medication List  
  
   
This list is accurate as of 8/30/18  1:49 PM.  Always use your most recent med list.  
  
  
  
  
 ferrous sulfate 325 mg (65 mg iron) tablet Commonly known as:  Iron (Ferrous Sulfate) Take 1 Tab by mouth two (2) times a day. levETIRAcetam 750 mg tablet Commonly known as:  KEPPRA TAKE 2 TABLETS BY MOUTH EVERY 12 HOURS FOR 30 DAYS  
  
 VIMPAT 150 mg Tab tablet Generic drug:  lacosamide Take 150 mg by mouth two (2) times a day. Please provide this summary of care documentation to your next provider. Your primary care clinician is listed as Mango Naik. If you have any questions after today's visit, please call [phone].

## 2018-11-29 NOTE — ED NOTES
Report called to Med-Surg/Tele. Salo Chinchilla RN receiving. SBAR report given. Pt to be transported to Med-Surg. beer

## 2020-01-01 ENCOUNTER — HOSPITAL ENCOUNTER (EMERGENCY)
Age: 75
Discharge: ANOTHER HOSPITAL | End: 2020-01-01
Attending: EMERGENCY MEDICINE
Payer: MEDICARE

## 2020-01-01 ENCOUNTER — APPOINTMENT (OUTPATIENT)
Dept: CT IMAGING | Age: 75
End: 2020-01-01
Attending: EMERGENCY MEDICINE
Payer: MEDICARE

## 2020-01-01 VITALS
SYSTOLIC BLOOD PRESSURE: 121 MMHG | HEART RATE: 88 BPM | OXYGEN SATURATION: 95 % | DIASTOLIC BLOOD PRESSURE: 68 MMHG | BODY MASS INDEX: 22.23 KG/M2 | WEIGHT: 129.5 LBS | RESPIRATION RATE: 20 BRPM | TEMPERATURE: 98.6 F

## 2020-01-01 DIAGNOSIS — R56.9 SEIZURE (HCC): Primary | ICD-10-CM

## 2020-01-01 DIAGNOSIS — R41.82 ALTERED MENTAL STATUS, UNSPECIFIED ALTERED MENTAL STATUS TYPE: ICD-10-CM

## 2020-01-01 DIAGNOSIS — R50.9 FEVER, UNSPECIFIED FEVER CAUSE: ICD-10-CM

## 2020-01-01 LAB
GLUCOSE BLD STRIP.AUTO-MCNC: 144 MG/DL (ref 65–100)
SERVICE CMNT-IMP: ABNORMAL

## 2020-01-01 PROCEDURE — 71045 X-RAY EXAM CHEST 1 VIEW: CPT

## 2020-01-01 PROCEDURE — 82962 GLUCOSE BLOOD TEST: CPT

## 2020-01-01 PROCEDURE — 96365 THER/PROPH/DIAG IV INF INIT: CPT

## 2020-01-01 PROCEDURE — 73030 X-RAY EXAM OF SHOULDER: CPT

## 2020-01-01 PROCEDURE — 74011000258 HC RX REV CODE- 258: Performed by: EMERGENCY MEDICINE

## 2020-01-01 PROCEDURE — 74011250636 HC RX REV CODE- 250/636: Performed by: EMERGENCY MEDICINE

## 2020-01-01 PROCEDURE — 74011250637 HC RX REV CODE- 250/637: Performed by: EMERGENCY MEDICINE

## 2020-01-01 PROCEDURE — 93005 ELECTROCARDIOGRAM TRACING: CPT

## 2020-01-01 PROCEDURE — 99285 EMERGENCY DEPT VISIT HI MDM: CPT

## 2020-01-01 RX ORDER — ACETAMINOPHEN 650 MG/1
650 SUPPOSITORY RECTAL
Status: COMPLETED | OUTPATIENT
Start: 2020-01-01 | End: 2020-01-01

## 2020-01-01 RX ADMIN — ACETAMINOPHEN 650 MG: 650 SUPPOSITORY RECTAL at 04:59

## 2020-01-01 RX ADMIN — LEVETIRACETAM 1500 MG: 500 INJECTION, SOLUTION, CONCENTRATE INTRAVENOUS at 04:50

## 2020-01-01 NOTE — ED NOTES
Verbal shift change report given to Lali Montiel RN (oncoming nurse) by Earlene Chang RN (offgoing nurse). Report included the following information SBAR.

## 2020-01-01 NOTE — ED NOTES
[facility] stated they still did not have anyone to send us to transport patient. MD and Charge nurse made aware.

## 2020-01-01 NOTE — ED NOTES
Contacted patients family member to let her know that the patient was being transferred to [facility].

## 2020-01-01 NOTE — ED NOTES
Patient brought in by EMS. Patient's family adamant about patient being transferred to [facility]. Patient daughter visibly upset that EMS brought patient here and not [facility]. EMS reports bringing the patient to the closest facility. Patients daughter reports patient had a seizure not witnessed by her and she has had a cold the past few days. Reports patient takes Keppra. Patients family member not originally willing to disclose patients information but eventually tells us the patients name and . Patients daughter continues to states \"I do not want her seen here, I want her to go to [facility]. \" Provider with this nurse speaking to patients daughter in lobby.

## 2020-01-01 NOTE — ED NOTES
TRANSFER - OUT REPORT:    Verbal report given to Aden Flores RN (name) on Alexander Leyva  being transferred to [facility] ED (unit) for routine progression of care       Report consisted of patients Situation, Background, Assessment and   Recommendations(SBAR). Information from the following report(s) SBAR was reviewed with the receiving nurse. Lines:       Opportunity for questions and clarification was provided.       Patient transported with:   ALVIN

## 2020-01-01 NOTE — ED NOTES
Provider going to attempt to get an IV with the ultrasound machine. Family members willing to allow patient to get the kepra ordered but nothing else done at this ED.

## 2020-01-01 NOTE — ED NOTES
Patient noted to have bed bugs at this time. Provider and primary nurse aware. Patient to be decontaminated.

## 2020-01-01 NOTE — ED NOTES
Attempted to get an IV on patient for blood work. Unsuccessful attempt and patient stated she did not want anything else done. MD made aware.

## 2020-01-01 NOTE — ED TRIAGE NOTES
Pt presents to the ED by [facility] with c/o a seizure PTA. Family who is with patient stated it was witnessed by another family member. Pt has a hx of seizures and takes keppra. Family member who is presents stated she is unsure of what happened or how long the seizure was. Family member is unwilling to give patient information because she wants the patient to go to [facility]. Pts family member stated she has had a cold for a few days with a fever. pt's family members are tearful and aggressive stating not to treat the patient and send her to [facility] like originally requested when they called [facility]. Pt is alert and appropriate. Pt stated she \"hurts and feels so bad. \" pt consented to being treated at this ED. Emergency Department Nursing Plan of Care       The Nursing Plan of Care is developed from the Nursing assessment and Emergency Department Attending provider initial evaluation. The plan of care may be reviewed in the ED Provider note.     The Plan of Care was developed with the following considerations:   Patient / Family readiness to learn indicated by:verbalized understanding  Persons(s) to be included in education: patient  Barriers to Learning/Limitations:No    Signed     Samara Masker    1/1/2020   3:37 AM

## 2020-01-01 NOTE — ED PROVIDER NOTES
EMERGENCY DEPARTMENT HISTORY AND PHYSICAL EXAM      Date: 1/1/2020  Patient Name: Nereyda Covarrubias    History of Presenting Illness     Chief Complaint   Patient presents with    Seizure       History Provided By: Patient    HPI: Nereyda Covarrubias, 66 y.o. female with PMHx as noted below presents the emergency department for seizure. Per report, patient had a witnessed grand mal seizure by family so EMS was called. They note the patient has been feeling sick for the last few days with a cough, congestion and low-grade fevers. They also note that she has missed her seizure medication today as they had not had a refilled yet. On EMS arrival, patient found to be confused but with stable vital signs. Complaining of left shoulder pain. no further history obtainable from the patient at this time. History is limited secondary to the patient's altered mental status. Family is very upset and aggressive towards myself and staff as they do not want anything done for the patient at our facility and are demanding transfer to [facility]. PCP: Karina Pepper MD    Current Outpatient Medications   Medication Sig Dispense Refill    ferrous sulfate (IRON, FERROUS SULFATE,) 325 mg (65 mg iron) tablet Take 1 Tab by mouth two (2) times a day. 90 Tab 0    levETIRAcetam (KEPPRA) 750 mg tablet TAKE 2 TABLETS BY MOUTH EVERY 12 HOURS FOR 30 DAYS  6    lacosamide (VIMPAT) 150 mg tab tablet Take 150 mg by mouth two (2) times a day. Past History     Past Medical History:  Past Medical History:   Diagnosis Date    Anemia     Arthritis     Asthma     Headache     Seizures ([address]        Past Surgical History:  Past Surgical History:   Procedure Laterality Date    NEUROLOGICAL PROCEDURE UNLISTED         Family History:  History reviewed. No pertinent family history.     Social History:  Social History     Tobacco Use    Smoking status: Heavy Tobacco Smoker     Packs/day: 0.00     Types: Cigarettes    Smokeless tobacco: Never Used   Substance Use Topics    Alcohol use: No    Drug use: No       Allergies:  No Known Allergies      Review of Systems   Review of Systems   Unable to perform ROS: Mental status change     Physical Exam   Physical Exam    GENERAL: alert, confused, no acute distress  EYES: PEERL, No injection, discharge or icterus. ENT: Mucous membranes dry. NECK: Supple  LUNGS: Airway patent. Non-labored respirations. Breath sounds clear with good air entry bilaterally. HEART: Regular rate and rhythm. No peripheral edema  ABDOMEN: Non-distended and non-tender, without guarding or rebound. SKIN:  warm, dry  MSK/EXTREMITIES: Without swelling, tenderness or deformity, symmetric with normal ROM  NEUROLOGICAL: Alert, follows commands, strength appears equal in all extremities, will not participate with full neurologic exam      Diagnostic Study Results     Labs -     No results found for this or any previous visit (from the past 12 hour(s)). Radiologic Studies -   XR CHEST PORT   Final Result      XR SHOULDER LT AP/LAT MIN 2 V   Final Result        CT Results  (Last 48 hours)    None        CXR Results  (Last 48 hours)               01/01/20 0407  XR CHEST PORT Final result    Impression:  IMPRESSION:   No acute process. Narrative:  INDICATION: Meets SIRS criteria       EXAM:  AP CHEST RADIOGRAPH       COMPARISON: July 25, 2018       FINDINGS:       AP portable view of the chest demonstrates a normal cardiomediastinal   silhouette. There is no edema, effusion, consolidation, or pneumothorax. Right   basilar atelectasis. Osseous structures are unremarkable. Medical Decision Making   I am the first provider for this patient. I reviewed the vital signs, available nursing notes, past medical history, past surgical history, family history and social history. Vital Signs-Reviewed the patient's vital signs. .      Records Reviewed: Nursing Notes and Old Medical Records    Provider Notes (Medical Decision Making):   Patient is a 25-year-old female presenting with complaint of seizure, noted to be febrile and altered by EMS. Family arrived shortly after and demanded that we stopped all treatment and refused any testing. Patient is unable to give any kind of consent given her altered mental status at this time so will honor family's wishes. They initially refused to give any patient information however after      Conversation they did provide some history noted that she had been ill for several days with a fever at home and then had a seizure today noting that she has not had her Keppra. Family eventually agreed to let me place an IV so that we could give the patient her Cira Pathak but again refused any additional medications or testing at our facility. I did express my concern to the family that she may be septic and I cannot exclude serious infection such as meningitis without further testing. They are well aware that by delaying diagnostics and potential antibiotic therapy that the patient could have negative long-term outcome and possibly be permanently disabled or die from this delay. Family is aware of this and accepts these risks and would continue to refuse any care at our facility with exception of her antiepileptic medication. Given their wishes, I have no choice but to arrange transfer for the patient and will not be able to provide any further care while she is in our facility with exception of treating any additional seizures. ED Course:   Initial assessment performed. The patients presenting problems have been discussed, and they are in agreement with the care plan formulated and outlined with them. I have encouraged them to ask questions as they arise throughout their visit. ED Course as of Jan 02 1541 Wed Jan 01, 2020   1653 Patient's family at bedside agrees to allow IV start and asked to give patient Keppra but again refusing any lab work or imaging here.   Demanding patient be transferred to [address] [place] [address] for any further work-up. [BN]      ED Course User Index  [BN] Carmita Curry MD     Procedure Note- Peripheral IV access with Ultrasound Guidance    Melva August MD gained IV access using 20 gauge needle because the patient had no vascular access. After cleaning the site with alcohol prep, AC vein was localized with ultrasound guidance in an anterior approach. Line confirmation was obtained by direct visualization and good blood return. No anaesthetic was used. The line was successfully flushed with normal saline and was secured with transparent tape. The patient tolerated the procedure without complication. PROGRESS: Informed family that there will be a delay in transfer and patient will be in our emergency department for at least the next hour. I again urged them to allow us to work the patient up and begin treatment however again they refused. They again expressed understanding that by delaying care this could ultimately lead to a poor outcome for the patient including disability and death and they accept these risks and will not allow us to perform any tests or treatment with exception of antiepileptic medications. PROGRESS: no further seizure activity while in ED, mental status gradually improving. - likely post ictal on arrival      Transfer Note:   Patient is being transferred to [address] [place] [address] per family request      Disposition:  [facility]    PLAN:  1. Transfer    Diagnosis     Clinical Impression:   1. Seizure ([address]    2. Fever, unspecified fever cause    3. Altered mental status, unspecified altered mental status type        Please note that this dictation was completed with Dragon, computer voice recognition software. Quite often unanticipated grammatical, syntax, homophones, and other interpretive errors are inadvertently transcribed by the computer software. Please disregard these errors.   Additionally, please excuse any errors that have escaped final proofreading.

## 2020-01-01 NOTE — ED NOTES
pts belongings are double bagged in a trash bag. Pt cleaned with soap and water and placed in a new gown. Pt given new sheets and blankets.

## 2020-01-02 LAB
ATRIAL RATE: 89 BPM
CALCULATED P AXIS, ECG09: 64 DEGREES
CALCULATED R AXIS, ECG10: -32 DEGREES
CALCULATED T AXIS, ECG11: 35 DEGREES
DIAGNOSIS, 93000: NORMAL
P-R INTERVAL, ECG05: 128 MS
Q-T INTERVAL, ECG07: 332 MS
QRS DURATION, ECG06: 82 MS
QTC CALCULATION (BEZET), ECG08: 403 MS
VENTRICULAR RATE, ECG03: 89 BPM

## 2020-07-15 ENCOUNTER — TELEPHONE (OUTPATIENT)
Dept: INTERNAL MEDICINE CLINIC | Age: 75
End: 2020-07-15

## 2021-01-05 ENCOUNTER — TELEPHONE (OUTPATIENT)
Dept: INTERNAL MEDICINE CLINIC | Age: 76
End: 2021-01-05

## 2021-03-01 ENCOUNTER — VIRTUAL VISIT (OUTPATIENT)
Dept: INTERNAL MEDICINE CLINIC | Age: 76
End: 2021-03-01
Payer: MEDICARE

## 2021-03-01 DIAGNOSIS — G03.0 ASEPTIC MENINGITIS: Primary | ICD-10-CM

## 2021-03-01 DIAGNOSIS — J45.20 MILD INTERMITTENT ASTHMA WITHOUT COMPLICATION: ICD-10-CM

## 2021-03-01 PROCEDURE — 99441 PR PHYS/QHP TELEPHONE EVALUATION 5-10 MIN: CPT | Performed by: INTERNAL MEDICINE

## 2021-03-01 RX ORDER — ASCORBIC ACID 500 MG
TABLET ORAL
COMMUNITY
Start: 2021-01-20 | End: 2021-03-01 | Stop reason: SDUPTHER

## 2021-03-01 RX ORDER — ASCORBIC ACID 500 MG
TABLET ORAL
Qty: 60 TAB | Refills: 3 | Status: SHIPPED | OUTPATIENT
Start: 2021-03-01 | End: 2021-07-07 | Stop reason: SDUPTHER

## 2021-03-01 RX ORDER — ACETAMINOPHEN 325 MG/1
650 TABLET ORAL
COMMUNITY
Start: 2019-08-29 | End: 2021-03-01 | Stop reason: SDUPTHER

## 2021-03-01 RX ORDER — ACETAMINOPHEN 325 MG/1
650 TABLET ORAL
Qty: 100 TAB | Refills: 3 | Status: SHIPPED | OUTPATIENT
Start: 2021-03-01 | End: 2021-07-07 | Stop reason: SDUPTHER

## 2021-03-01 NOTE — PROGRESS NOTES
Chief Complaint   Patient presents with    Seizure    Transitions Of Care     3 most recent PHQ Screens 3/1/2021   PHQ Not Done Medical Reason (indicate in comments)   Little interest or pleasure in doing things Not at all   Feeling down, depressed, irritable, or hopeless Not at all   Total Score PHQ 2 0     1. Have you been to the ER, urgent care clinic since your last visit? Hospitalized since your last visit? yes    2. Have you seen or consulted any other health care providers outside of the [address] [place] [address] since your last visit? Include any pap smears or colon screening. yes

## 2021-03-01 NOTE — PROGRESS NOTES
Stacie Cunha is a 64 y.o. female evaluated via telephone on 3/1/2021. Consent:  She and/or health care decision maker is aware that she may receive a bill for this telephone service, depending on her insurance coverage, and has provided verbal consent to proceed: Yes      Documentation:  I communicated with the patient and/or health care decision maker about bronchitis and menigitis. Details of this discussion including any medical advice provided: bronchitis      I affirm this is a Patient Initiated Episode with an Established Patient who has not had a related appointment within my department in the past 7 days or scheduled within the next 24 hours. Total Time: minutes: 5-10 minutes      Note: not billable if this call serves to triage the patient into an appointment for the relevant concern      Meredith Middleton MD      She has recently been placed on Eliquis  She has a recent history of aseptic menigitis  She has  Been stable without recent seizures    Asthma Review:  The patient is being seen for follow up of asthma,  currently stable. Asthma symptoms occur: infrequently. Wheezing when present is described as mild and easily relieved with rescue bronchodilator. The patient reports use of a steroid inhaler. Frequency of use of quick-relief meds: rarely. Regimen compliance: The patient reports adherence to this regimen. Review of Systems  Constitutional: negative for fevers, chills, anorexia and weight loss  Eyes:   negative for visual disturbance and irritation  ENT:   negative for tinnitus,sore throat,nasal congestion,ear pains. hoarseness  Respiratory:  negative for cough, hemoptysis, dyspnea,wheezing  CV:   negative for chest pain, palpitations, lower extremity edema  GI:   negative for nausea, vomiting, diarrhea, abdominal pain,melena  Endo:               negative for polyuria,polydipsia,polyphagia,heat intolerance  Genitourinary: negative for frequency, dysuria and hematuria  Integument:  negative for rash and pruritus  Hematologic:  negative for easy bruising and gum/nose bleeding  Musculoskel: negative for myalgias, arthralgias, back pain, muscle weakness, joint pain  Neurological:  negative for headaches, dizziness, vertigo, memory problems and gait   Behavl/Psych: negative for feelings of anxiety, depression, mood changes    Past Medical History:   Diagnosis Date    Anemia     Arthritis     Asthma     Headache     Seizures (HCC)      Past Surgical History:   Procedure Laterality Date    NEUROLOGICAL PROCEDURE UNLISTED       Social History     Socioeconomic History    Marital status:      Spouse name: Not on file    Number of children: Not on file    Years of education: Not on file    Highest education level: Not on file   Tobacco Use    Smoking status: Heavy Tobacco Smoker     Packs/day: 0.00     Types: Cigarettes    Smokeless tobacco: Never Used   Substance and Sexual Activity    Alcohol use: No    Drug use: No    Sexual activity: Not Currently     History reviewed. No pertinent family history. Current Outpatient Medications   Medication Sig Dispense Refill    acetaminophen (TYLENOL) 325 mg tablet Take 650 mg by mouth.  apixaban (ELIQUIS) 5 mg tablet 5 mg = 1 tab each dose, PO, every 12 hours, as directed on package labeling. start after finishing the bottle given at discharge, # 180 tab, 3 Refills, Earliest fill date: 02/15/21, Pharmacy: [facility]/pharmacy #[id number]     ascorbic acid, vitamin C, (VITAMIN C) 500 mg tablet 500 mg = 1 tab each dose, PO, bid      ferrous sulfate (IRON, FERROUS SULFATE,) 325 mg (65 mg iron) tablet Take 1 Tab by mouth two (2) times a day. 90 Tab 0    levETIRAcetam (KEPPRA) 750 mg tablet TAKE 2 TABLETS BY MOUTH EVERY 12 HOURS FOR 30 DAYS  6    lacosamide (VIMPAT) 150 mg tab tablet Take 150 mg by mouth two (2) times a day. No Known Allergies    Objective: There were no vitals taken for this visit.       Results for orders placed or performed during the hospital encounter of 20   GLUCOSE, POC   Result Value Ref Range    Glucose (POC) 144 (H) 65 - 100 mg/dL    Performed by Karyn Simental    EKG, 12 LEAD, INITIAL   Result Value Ref Range    Ventricular Rate 89 BPM    Atrial Rate 89 BPM    P-R Interval 128 ms    QRS Duration 82 ms    Q-T Interval 332 ms    QTC Calculation (Bezet) 403 ms    Calculated P Axis 64 degrees    Calculated R Axis -32 degrees    Calculated T Axis 35 degrees    Diagnosis       Sinus rhythm with premature supraventricular complexes  Left axis deviation  Low voltage QRS  Nonspecific T wave abnormality  Abnormal ECG  When compared with ECG of 2018 00:05,  premature supraventricular complexes are now present  T wave inversion less evident in Anterior leads  Confirmed by Viv Chaudhari M.D., Michael Mario ([id number]) on 2020 9:51:06 AM         Assessment/Plan:    ICD-10-CM ICD-9-CM    1. Aseptic meningitis  G03.0 047.9    2. Mild intermittent asthma without complication  X07.68 948.69      Orders Placed This Encounter    acetaminophen (TYLENOL) 325 mg tablet     Sig: Take 650 mg by mouth.  apixaban (ELIQUIS) 5 mg tablet     Si mg = 1 tab each dose, PO, every 12 hours, as directed on package labeling. start after finishing the bottle given at discharge, # 180 tab, 3 Refills, Earliest fill date: 02/15/21, Pharmacy: [facility]/pharmacy #[id number]   ascorbic acid, vitamin C, (VITAMIN C) 500 mg tablet     Si mg = 1 tab each dose, PO, bid     call if any problems,  Patient Instructions   [employer] Activation    Thank you for requesting access to [employer]. Please follow the instructions below to securely access and download your online medical record. [employer] allows you to send messages to your doctor, view your test results, renew your prescriptions, schedule appointments, and more. How Do I Sign Up? 1. In your internet browser, go to [email or web address]  2. Click on the First Time User?  Click Here link in the Sign In box. You will be redirect to the New Member Sign Up page. 3. Enter your [employer] Access Code exactly as it appears below. You will not need to use this code after youve completed the sign-up process. If you do not sign up before the expiration date, you must request a new code. [employer]OTE[employer] Access Code: [email or web address]  Expires: 4/15/2021  3:12 PM (This is the date your PROTEGO access code will )    4. Enter the last four digits of your Social Security Number (xxxx) and Date of Birth (mm/dd/yyyy) as indicated and click Submit. You will be taken to the next sign-up page. 5. Create a Rent[employer] ID. This will be your PROTEGO login ID and cannot be changed, so think of one that is secure and easy to remember. 6. Create a PROTEGO password. You can change your password at any time. 7. Enter your Password Reset Question and Answer. This can be used at a later time if you forget your password. 8. Enter your e-mail address. You will receive e-mail notification when new information is available in [address] 9. Click Sign Up. You can now view and download portions of your medical record. 10. Click the Download Summary menu link to download a portable copy of your medical information. Additional Information    If you have questions, please visit the Frequently Asked Questions section of the PROTEGO website at [email or web address] Remember, [employer] is NOT to be used for urgent needs. For medical emergencies, dial 911. Follow-up and Dispositions    · Return in about 4 weeks (around 3/29/2021). I have reviewed with the patient details of the assessment and plan and all questions were answered. Relevent patient education was performed. The most recent lab findings were reviewed with the patient. An After Visit Summary was printed and given to the patient.

## 2021-03-01 NOTE — PATIENT INSTRUCTIONS
[employer] Activation Thank you for requesting access to [employer]. Please follow the instructions below to securely access and download your online medical record. [employer] allows you to send messages to your doctor, view your test results, renew your prescriptions, schedule appointments, and more. How Do I Sign Up? 1. In your internet browser, go to [email or web address] 
2. Click on the First Time User? Click Here link in the Sign In box. You will be redirect to the New Member Sign Up page. 3. Enter your [employer] Access Code exactly as it appears below. You will not need to use this code after youve completed the sign-up process. If you do not sign up before the expiration date, you must request a new code. [employer] Access Code: [email or web address] Expires: 4/15/2021  3:12 PM (This is the date your [employer] access code will ) 4. Enter the last four digits of your Social Security Number (xxxx) and Date of Birth (mm/dd/yyyy) as indicated and click Submit. You will be taken to the next sign-up page. 5. Create a [employer] ID. This will be your [employer] login ID and cannot be changed, so think of one that is secure and easy to remember. 6. Create a [employer] password. You can change your password at any time. 7. Enter your Password Reset Question and Answer. This can be used at a later time if you forget your password. 8. Enter your e-mail address. You will receive e-mail notification when new information is available in [address] 9. Click Sign Up. You can now view and download portions of your medical record. 10. Click the Download Summary menu link to download a portable copy of your medical information. Additional Information If you have questions, please visit the Frequently Asked Questions section of the [employer] website at [email or web address] Remember, [employer] is NOT to be used for urgent needs. For medical emergencies, dial 911.

## 2021-06-10 NOTE — ED NOTES
TELEPHONIC VISIT PROGRESS NOTE    This visit is being performed virtually.  Clinician Location: [facility]  Dyana's Location: Home  Length of call: 10 minutes    CHIEF COMPLAINT  Cough and Congestion      SUBJECTIVE  The patient is a 59 year old female who is being evaluated via a Telephonic Visit for \"cold that won't leave.\" Started about 3 weeks ago. Started with clear runny nose and cough. Runny nose has improved but cough is still there. Cough has come and gone. Last night cough seemed worse. Took some dayquil that helped. Has sore throat. No fever.  Denies post nasal drip. Does have sinus pressure. Cough is dry, non-productive. Denies shortness of breath.    REVIEW OF SYSTEMS  All systems reviewed and are negative with the exception of the findings noted in the history of present illness.     PHYSICAL EXAM  There were no vitals filed for this visit.   She is alert, nontoxic with fluent speech      ASSESSMENT/PLAN  Viral URI with cough    Orders Placed This Encounter   • fluticasone (FLONASE) 50 MCG/ACT nasal spray   • benzonatate (TESSALON PERLES) 100 MG capsule     - symptomatic care for cough  - discussed obtaining in person eval for worsening or new symptoms    FOLLOW UP  No follow-ups on file.       Family member taking patients clothes home to wash them. Daughter Eryn Dsouza is requesting we call her when RAA is at the bedside taking patient to [facility].      Contact information: [phone]

## 2021-07-07 ENCOUNTER — VIRTUAL VISIT (OUTPATIENT)
Dept: INTERNAL MEDICINE CLINIC | Age: 76
End: 2021-07-07
Payer: MEDICARE

## 2021-07-07 DIAGNOSIS — R56.9 SEIZURE (HCC): Primary | ICD-10-CM

## 2021-07-07 PROCEDURE — 1090F PRES/ABSN URINE INCON ASSESS: CPT | Performed by: INTERNAL MEDICINE

## 2021-07-07 PROCEDURE — G8427 DOCREV CUR MEDS BY ELIG CLIN: HCPCS | Performed by: INTERNAL MEDICINE

## 2021-07-07 PROCEDURE — 99213 OFFICE O/P EST LOW 20 MIN: CPT | Performed by: INTERNAL MEDICINE

## 2021-07-07 PROCEDURE — G8400 PT W/DXA NO RESULTS DOC: HCPCS | Performed by: INTERNAL MEDICINE

## 2021-07-07 PROCEDURE — G8432 DEP SCR NOT DOC, RNG: HCPCS | Performed by: INTERNAL MEDICINE

## 2021-07-07 PROCEDURE — 1101F PT FALLS ASSESS-DOCD LE1/YR: CPT | Performed by: INTERNAL MEDICINE

## 2021-07-07 RX ORDER — LACOSAMIDE 150 MG/1
TABLET ORAL
Status: ON HOLD | COMMUNITY
Start: 2021-04-20 | End: 2022-06-02

## 2021-07-07 RX ORDER — ASCORBIC ACID 500 MG
TABLET ORAL
Qty: 60 TABLET | Refills: 3 | Status: SHIPPED | OUTPATIENT
Start: 2021-07-07 | End: 2021-10-26

## 2021-07-07 RX ORDER — LEVETIRACETAM 750 MG/1
TABLET ORAL
Qty: 120 TABLET | Refills: 3 | Status: SHIPPED | OUTPATIENT
Start: 2021-07-07 | End: 2022-02-22

## 2021-07-07 RX ORDER — ACETAMINOPHEN 325 MG/1
650 TABLET ORAL
Qty: 100 TABLET | Refills: 3 | Status: SHIPPED | OUTPATIENT
Start: 2021-07-07 | End: 2022-06-29 | Stop reason: SDUPTHER

## 2021-07-07 RX ORDER — LACOSAMIDE 150 MG/1
150 TABLET ORAL 2 TIMES DAILY
Qty: 60 TABLET | Refills: 3 | Status: SHIPPED | OUTPATIENT
Start: 2021-07-07 | End: 2022-01-05

## 2021-07-07 NOTE — LETTER
7/8/2021 11:17 AM    Ms. Fernández Search  [address] [place] [phone]    Please provide a hand rail as well a raised toilet seat            Sincerely,      Karla Aragon MD

## 2021-07-07 NOTE — PROGRESS NOTES
Andria Velazquez is a 64 y.o. female being evaluated by a Virtual Visit (video visit) encounter to address concerns as mentioned above. A caregiver was present when appropriate. Due to this being a TeleHealth encounter (During DEDUI-12 public health emergency), evaluation of the following organ systems was limited: Vitals/Constitutional/EENT/Resp/CV/GI//MS/Neuro/Skin/Heme-Lymph-Imm. Pursuant to the emergency declaration under the [address] [place] [address] and the [employer] Act, this Virtual Visit was conducted with patient's (and/or legal guardian's) consent, to reduce the risk of exposure to COVID-19 and provide necessary medical care. Services were provided through a video synchronous discussion virtually to substitute for in-person encounter. --Cruz Jones MD on 7/7/2021 at 4:35 PM    An electronic signature was used to authenticate this note. Andria Velazquez is a 64 y.o. female and presents with Letter and Seizure  . Subjective:   Seizure Review:  Patient presents for evaluation of seizures. Patient has been stable without any new seizures reported. Patient has tolerated his medication thus far. She has had some mobility issues    Review of Systems  Constitutional: negative for fevers, chills, anorexia and weight loss  Eyes:   negative for visual disturbance and irritation  ENT:   negative for tinnitus,sore throat,nasal congestion,ear pains. hoarseness  Respiratory:  negative for cough, hemoptysis, dyspnea,wheezing  CV:   negative for chest pain, palpitations, lower extremity edema  GI:   negative for nausea, vomiting, diarrhea, abdominal pain,melena  Endo:               negative for polyuria,polydipsia,polyphagia,heat intolerance  Genitourinary: negative for frequency, dysuria and hematuria  Integument:  negative for rash and pruritus  Hematologic:  negative for easy bruising and gum/nose bleeding  Musculoskel: negative for myalgias, arthralgias, back pain, muscle weakness, joint pain  Neurological:  negative for headaches, dizziness, vertigo, memory problems and gait   Behavl/Psych: negative for feelings of anxiety, depression, mood changes    Past Medical History:   Diagnosis Date    Anemia     Arthritis     Asthma     Headache     Seizures (HCC)      Past Surgical History:   Procedure Laterality Date    NEUROLOGICAL PROCEDURE UNLISTED       Social History     Socioeconomic History    Marital status:      Spouse name: Not on file    Number of children: Not on file    Years of education: Not on file    Highest education level: Not on file   Tobacco Use    Smoking status: Heavy Tobacco Smoker     Packs/day: 0.00     Types: Cigarettes    Smokeless tobacco: Never Used   Substance and Sexual Activity    Alcohol use: No    Drug use: No    Sexual activity: Not Currently     Social Determinants of Health     Financial Resource Strain:     Difficulty of Paying Living Expenses:    Food Insecurity:     Worried About Running Out of Food in the Last Year:     Ran Out of Food in the Last Year:    Transportation Needs:     Lack of Transportation (Medical):  Lack of Transportation (Non-Medical):    Physical Activity:     Days of Exercise per Week:     Minutes of Exercise per Session:    Stress:     Feeling of Stress :    Social Connections:     Frequency of Communication with Friends and Family:     Frequency of Social Gatherings with Friends and Family:     Attends Restorationist Services:     Active Member of Clubs or Organizations:     Attends Club or Organization Meetings:     Marital Status:      History reviewed. No pertinent family history.   Current Outpatient Medications   Medication Sig Dispense Refill    ascorbic acid, vitamin C, (VITAMIN C) 500 mg tablet 500 mg = 1 tab each dose, PO, bid 60 Tablet 3    acetaminophen (TYLENOL) 325 mg tablet Take 2 Tablets by mouth every six (6) hours as needed for Pain. 100 Tablet 3    lacosamide (VIMPAT) 150 mg tab tablet 150 mg = 1 tab each dose, PO, bid, # 60 tab, 5 Refills, Pharmacy: [facility]/pharmacy #[id number]      lacosamide (Vimpat) 150 mg tab tablet Take 1 Tablet by mouth two (2) times a day. Max Daily Amount: 300 mg. Take 150 mg by mouth two (2) times a day. 60 Tablet 3    apixaban (ELIQUIS) 5 mg tablet Take 1 Tablet by mouth two (2) times a day. 60 Tablet 3    levETIRAcetam (KEPPRA) 750 mg tablet TAKE 2 TABLETS BY MOUTH EVERY 12 HOURS FOR 30 DAYS 120 Tablet 3    ferrous sulfate (IRON, FERROUS SULFATE,) 325 mg (65 mg iron) tablet Take 1 Tab by mouth two (2) times a day. 90 Tab 0     No Known Allergies    Objective: There were no vitals taken for this visit. Physical Exam:   General appearance - alert, well appearing, and in no distress  Mental status - alert, oriented to person, place, and time  EYE-BRENDA, EOMI, corneas normal, no foreign bodies  ENT-ENT exam normal, no neck nodes or sinus tenderness  Nose - normal and patent, no erythema, discharge or polyps  Mouth - mucous membranes moist, pharynx normal without lesions  Skin-Warm and dry.  no hyperpigmentation, vitiligo, or suspicious lesions  Neuro -alert, oriented, normal speech, no focal findings or movement disorder noted  Neck-normal C-spine, no tenderness, full ROM without pain        Results for orders placed or performed during the hospital encounter of 01/01/20   GLUCOSE, POC   Result Value Ref Range    Glucose (POC) 144 (H) 65 - 100 mg/dL    Performed by OPAL BOSTON    EKG, 12 LEAD, INITIAL   Result Value Ref Range    Ventricular Rate 89 BPM    Atrial Rate 89 BPM    P-R Interval 128 ms    QRS Duration 82 ms    Q-T Interval 332 ms    QTC Calculation (Bezet) 403 ms    Calculated P Axis 64 degrees    Calculated R Axis -32 degrees    Calculated T Axis 35 degrees    Diagnosis       Sinus rhythm with premature supraventricular complexes  Left axis deviation  Low voltage QRS  Nonspecific T wave abnormality  Abnormal ECG  When compared with ECG of 2018 00:05,  premature supraventricular complexes are now present  T wave inversion less evident in Anterior leads  Confirmed by Marquis Mic M.D., Willy Pritchard ([id number]) on 2020 9:51:06 AM         Assessment/Plan:    ICD-10-CM ICD-9-CM    1. Seizure ([address]  R56.9 780.39 lacosamide (Vimpat) 150 mg tab tablet     Orders Placed This Encounter    ascorbic acid, vitamin C, (VITAMIN C) 500 mg tablet     Si mg = 1 tab each dose, PO, bid     Dispense:  60 Tablet     Refill:  3    acetaminophen (TYLENOL) 325 mg tablet     Sig: Take 2 Tablets by mouth every six (6) hours as needed for Pain. Dispense:  100 Tablet     Refill:  3    lacosamide (VIMPAT) 150 mg tab tablet     Si mg = 1 tab each dose, PO, bid, # 60 tab, 5 Refills, Pharmacy: [facility]/pharmacy #[id number]    lacosamide (Vimpat) 150 mg tab tablet     Sig: Take 1 Tablet by mouth two (2) times a day. Max Daily Amount: 300 mg. Take 150 mg by mouth two (2) times a day. Dispense:  60 Tablet     Refill:  3    apixaban (ELIQUIS) 5 mg tablet     Sig: Take 1 Tablet by mouth two (2) times a day. Dispense:  60 Tablet     Refill:  3    levETIRAcetam (KEPPRA) 750 mg tablet     Sig: TAKE 2 TABLETS BY MOUTH EVERY 12 HOURS FOR 30 DAYS     Dispense:  120 Tablet     Refill:  3     lose weight, call if any problems,Take 81mg aspirin daily  There are no Patient Instructions on file for this visit. I have reviewed with the patient details of the assessment and plan and all questions were answered. Relevent patient education was performed. The most recent lab findings were reviewed with the patient. An After Visit Summary was printed and given to the patient. Graeme Chawla MD      Seizure Review:  Patient presents for evaluation of seizures. Patient has been stable without any new seizures reported. Patient has tolerated his medication thus far.     She states she is out of medication    Review of Systems  Constitutional: negative for fevers, chills, anorexia and weight loss  Eyes:   negative for visual disturbance and irritation  ENT:   negative for tinnitus,sore throat,nasal congestion,ear pains. hoarseness  Respiratory:  negative for cough, hemoptysis, dyspnea,wheezing  CV:   negative for chest pain, palpitations, lower extremity edema  GI:   negative for nausea, vomiting, diarrhea, abdominal pain,melena  Endo:               negative for polyuria,polydipsia,polyphagia,heat intolerance  Genitourinary: negative for frequency, dysuria and hematuria  Integument:  negative for rash and pruritus  Hematologic:  negative for easy bruising and gum/nose bleeding  Musculoskel: negative for myalgias, arthralgias, back pain, muscle weakness, joint pain  Neurological:  negative for headaches, dizziness, vertigo, memory problems and gait   Behavl/Psych: negative for feelings of anxiety, depression, mood changes    Past Medical History:   Diagnosis Date    Anemia     Arthritis     Asthma     Headache     Seizures (HCC)      Past Surgical History:   Procedure Laterality Date    NEUROLOGICAL PROCEDURE UNLISTED       Social History     Socioeconomic History    Marital status:      Spouse name: Not on file    Number of children: Not on file    Years of education: Not on file    Highest education level: Not on file   Tobacco Use    Smoking status: Heavy Tobacco Smoker     Packs/day: 0.00     Types: Cigarettes    Smokeless tobacco: Never Used   Substance and Sexual Activity    Alcohol use: No    Drug use: No    Sexual activity: Not Currently     Social Determinants of Health     Financial Resource Strain:     Difficulty of Paying Living Expenses:    Food Insecurity:     Worried About Running Out of Food in the Last Year:     Ran Out of Food in the Last Year:    Transportation Needs:     Lack of Transportation (Medical):      Lack of Transportation (Non-Medical):    Physical Activity:     Days of Exercise per Week:     Minutes of Exercise per Session:    Stress:     Feeling of Stress :    Social Connections:     Frequency of Communication with Friends and Family:     Frequency of Social Gatherings with Friends and Family:     Attends Mu-ism Services:     Active Member of Clubs or Organizations:     Attends Club or Organization Meetings:     Marital Status:      No family history on file. Current Outpatient Medications   Medication Sig Dispense Refill    ascorbic acid, vitamin C, (VITAMIN C) 500 mg tablet 500 mg = 1 tab each dose, PO, bid 60 Tablet 3    acetaminophen (TYLENOL) 325 mg tablet Take 2 Tablets by mouth every six (6) hours as needed for Pain. 100 Tablet 3    lacosamide (VIMPAT) 150 mg tab tablet 150 mg = 1 tab each dose, PO, bid, # 60 tab, 5 Refills, Pharmacy: [facility]/pharmacy #[id number]      lacosamide (Vimpat) 150 mg tab tablet Take 1 Tablet by mouth two (2) times a day. Max Daily Amount: 300 mg. Take 150 mg by mouth two (2) times a day. 60 Tablet 3    apixaban (ELIQUIS) 5 mg tablet Take 1 Tablet by mouth two (2) times a day. 60 Tablet 3    levETIRAcetam (KEPPRA) 750 mg tablet TAKE 2 TABLETS BY MOUTH EVERY 12 HOURS FOR 30 DAYS 120 Tablet 3    ferrous sulfate (IRON, FERROUS SULFATE,) 325 mg (65 mg iron) tablet Take 1 Tab by mouth two (2) times a day. 90 Tab 0     No Known Allergies    Objective: There were no vitals taken for this visit.       Results for orders placed or performed during the hospital encounter of 01/01/20   GLUCOSE, POC   Result Value Ref Range    Glucose (POC) 144 (H) 65 - 100 mg/dL    Performed by Gabriel Bush    EKG, 12 LEAD, INITIAL   Result Value Ref Range    Ventricular Rate 89 BPM    Atrial Rate 89 BPM    P-R Interval 128 ms    QRS Duration 82 ms    Q-T Interval 332 ms    QTC Calculation (Bezet) 403 ms    Calculated P Axis 64 degrees    Calculated R Axis -32 degrees    Calculated T Axis 35 degrees    Diagnosis       Sinus rhythm with premature supraventricular complexes  Left axis deviation  Low voltage QRS  Nonspecific T wave abnormality  Abnormal ECG  When compared with ECG of 2018 00:05,  premature supraventricular complexes are now present  T wave inversion less evident in Anterior leads  Confirmed by Sherrie Weiss M.D., Grady Burns ([id number]) on 2020 9:51:06 AM         Assessment/Plan:    ICD-10-CM ICD-9-CM    1. Seizure ([address]  R56.9 780.39 lacosamide (Vimpat) 150 mg tab tablet     Orders Placed This Encounter    ascorbic acid, vitamin C, (VITAMIN C) 500 mg tablet     Si mg = 1 tab each dose, PO, bid     Dispense:  60 Tablet     Refill:  3    acetaminophen (TYLENOL) 325 mg tablet     Sig: Take 2 Tablets by mouth every six (6) hours as needed for Pain. Dispense:  100 Tablet     Refill:  3    lacosamide (VIMPAT) 150 mg tab tablet     Si mg = 1 tab each dose, PO, bid, # 60 tab, 5 Refills, Pharmacy: [facility]/pharmacy #[id number]    lacosamide (Vimpat) 150 mg tab tablet     Sig: Take 1 Tablet by mouth two (2) times a day. Max Daily Amount: 300 mg. Take 150 mg by mouth two (2) times a day. Dispense:  60 Tablet     Refill:  3    apixaban (ELIQUIS) 5 mg tablet     Sig: Take 1 Tablet by mouth two (2) times a day. Dispense:  60 Tablet     Refill:  3    levETIRAcetam (KEPPRA) 750 mg tablet     Sig: TAKE 2 TABLETS BY MOUTH EVERY 12 HOURS FOR 30 DAYS     Dispense:  120 Tablet     Refill:  3     call if any problems,  There are no Patient Instructions on file for this visit. I have reviewed with the patient details of the assessment and plan and all questions were answered. Relevent patient education was performed. The most recent lab findings were reviewed with the patient. An After Visit Summary was printed and given to the patient.

## 2021-10-26 RX ORDER — ASCORBIC ACID 500 MG
TABLET ORAL
Qty: 180 TABLET | Refills: 1 | Status: SHIPPED | OUTPATIENT
Start: 2021-10-26 | End: 2022-02-22

## 2022-01-05 DIAGNOSIS — R56.9 SEIZURE (HCC): ICD-10-CM

## 2022-01-05 RX ORDER — LACOSAMIDE 150 MG/1
TABLET, FILM COATED ORAL
Qty: 60 TABLET | Refills: 3 | Status: SHIPPED | OUTPATIENT
Start: 2022-01-05 | End: 2022-06-29 | Stop reason: SDUPTHER

## 2022-02-22 RX ORDER — LEVETIRACETAM 750 MG/1
TABLET ORAL
Qty: 360 TABLET | Refills: 1 | Status: SHIPPED | OUTPATIENT
Start: 2022-02-22 | End: 2022-06-04

## 2022-02-22 RX ORDER — ASCORBIC ACID 500 MG
TABLET ORAL
Qty: 180 TABLET | Refills: 1 | Status: SHIPPED | OUTPATIENT
Start: 2022-02-22 | End: 2022-06-29 | Stop reason: SDUPTHER

## 2022-03-18 PROBLEM — A41.9 SEPSIS (HCC): Status: ACTIVE | Noted: 2018-07-25

## 2022-03-19 PROBLEM — R56.9 SEIZURE (HCC): Status: ACTIVE | Noted: 2018-07-25

## 2022-03-19 PROBLEM — D50.9 IRON DEFICIENCY ANEMIA: Status: ACTIVE | Noted: 2018-07-25

## 2022-05-30 RX ORDER — APIXABAN 5 MG/1
TABLET, FILM COATED ORAL
Qty: 180 TABLET | Refills: 1 | Status: ON HOLD | OUTPATIENT
Start: 2022-05-30 | End: 2022-06-02

## 2022-06-02 ENCOUNTER — APPOINTMENT (OUTPATIENT)
Dept: GENERAL RADIOLOGY | Age: 77
DRG: 101 | End: 2022-06-02
Attending: STUDENT IN AN ORGANIZED HEALTH CARE EDUCATION/TRAINING PROGRAM
Payer: MEDICARE

## 2022-06-02 ENCOUNTER — HOSPITAL ENCOUNTER (INPATIENT)
Age: 77
LOS: 2 days | Discharge: HOME HEALTH | DRG: 101 | End: 2022-06-04
Attending: STUDENT IN AN ORGANIZED HEALTH CARE EDUCATION/TRAINING PROGRAM | Admitting: STUDENT IN AN ORGANIZED HEALTH CARE EDUCATION/TRAINING PROGRAM
Payer: MEDICARE

## 2022-06-02 ENCOUNTER — APPOINTMENT (OUTPATIENT)
Dept: CT IMAGING | Age: 77
DRG: 101 | End: 2022-06-02
Attending: STUDENT IN AN ORGANIZED HEALTH CARE EDUCATION/TRAINING PROGRAM
Payer: MEDICARE

## 2022-06-02 DIAGNOSIS — R56.9 SEIZURE (HCC): Primary | ICD-10-CM

## 2022-06-02 DIAGNOSIS — R56.9 POSTICTAL STATE (HCC): ICD-10-CM

## 2022-06-02 LAB
ALBUMIN SERPL-MCNC: 3 G/DL (ref 3.5–5)
ALBUMIN SERPL-MCNC: 3.2 G/DL (ref 3.5–5)
ALBUMIN/GLOB SERPL: 0.8 {RATIO} (ref 1.1–2.2)
ALBUMIN/GLOB SERPL: 0.8 {RATIO} (ref 1.1–2.2)
ALP SERPL-CCNC: 81 U/L (ref 45–117)
ALP SERPL-CCNC: 87 U/L (ref 45–117)
ALT SERPL-CCNC: 11 U/L (ref 12–78)
ALT SERPL-CCNC: 12 U/L (ref 12–78)
ANION GAP SERPL CALC-SCNC: 4 MMOL/L (ref 5–15)
ANION GAP SERPL CALC-SCNC: 7 MMOL/L (ref 5–15)
APPEARANCE UR: CLEAR
AST SERPL-CCNC: 14 U/L (ref 15–37)
AST SERPL-CCNC: 16 U/L (ref 15–37)
BASOPHILS # BLD: 0 K/UL (ref 0–0.1)
BASOPHILS NFR BLD: 0 % (ref 0–1)
BILIRUB SERPL-MCNC: 0.4 MG/DL (ref 0.2–1)
BILIRUB SERPL-MCNC: 0.5 MG/DL (ref 0.2–1)
BILIRUB UR QL: NEGATIVE
BUN SERPL-MCNC: 6 MG/DL (ref 6–20)
BUN SERPL-MCNC: 7 MG/DL (ref 6–20)
BUN/CREAT SERPL: 8 (ref 12–20)
BUN/CREAT SERPL: 9 (ref 12–20)
CALCIUM SERPL-MCNC: 8.6 MG/DL (ref 8.5–10.1)
CALCIUM SERPL-MCNC: 8.8 MG/DL (ref 8.5–10.1)
CHLORIDE SERPL-SCNC: 101 MMOL/L (ref 97–108)
CHLORIDE SERPL-SCNC: 103 MMOL/L (ref 97–108)
CO2 SERPL-SCNC: 29 MMOL/L (ref 21–32)
CO2 SERPL-SCNC: 31 MMOL/L (ref 21–32)
COLOR UR: ABNORMAL
CREAT SERPL-MCNC: 0.8 MG/DL (ref 0.55–1.02)
CREAT SERPL-MCNC: 0.82 MG/DL (ref 0.55–1.02)
DIFFERENTIAL METHOD BLD: ABNORMAL
EOSINOPHIL # BLD: 0 K/UL (ref 0–0.4)
EOSINOPHIL NFR BLD: 1 % (ref 0–7)
ERYTHROCYTE [DISTWIDTH] IN BLOOD BY AUTOMATED COUNT: 16.4 % (ref 11.5–14.5)
FLUAV RNA SPEC QL NAA+PROBE: NOT DETECTED
FLUBV RNA SPEC QL NAA+PROBE: NOT DETECTED
GLOBULIN SER CALC-MCNC: 3.7 G/DL (ref 2–4)
GLOBULIN SER CALC-MCNC: 4 G/DL (ref 2–4)
GLUCOSE BLD STRIP.AUTO-MCNC: 119 MG/DL (ref 65–117)
GLUCOSE SERPL-MCNC: 107 MG/DL (ref 65–100)
GLUCOSE SERPL-MCNC: 117 MG/DL (ref 65–100)
GLUCOSE UR STRIP.AUTO-MCNC: NEGATIVE MG/DL
HCT VFR BLD AUTO: 32.4 % (ref 35–47)
HGB BLD-MCNC: 9.6 G/DL (ref 11.5–16)
HGB UR QL STRIP: NEGATIVE
IMM GRANULOCYTES # BLD AUTO: 0 K/UL (ref 0–0.04)
IMM GRANULOCYTES NFR BLD AUTO: 0 % (ref 0–0.5)
KETONES UR QL STRIP.AUTO: NEGATIVE MG/DL
LACTATE SERPL-SCNC: 1 MMOL/L (ref 0.4–2)
LEUKOCYTE ESTERASE UR QL STRIP.AUTO: NEGATIVE
LYMPHOCYTES # BLD: 1.2 K/UL (ref 0.8–3.5)
LYMPHOCYTES NFR BLD: 13 % (ref 12–49)
MCH RBC QN AUTO: 23.5 PG (ref 26–34)
MCHC RBC AUTO-ENTMCNC: 29.6 G/DL (ref 30–36.5)
MCV RBC AUTO: 79.2 FL (ref 80–99)
MONOCYTES # BLD: 0.5 K/UL (ref 0–1)
MONOCYTES NFR BLD: 5 % (ref 5–13)
NEUTS SEG # BLD: 7.1 K/UL (ref 1.8–8)
NEUTS SEG NFR BLD: 81 % (ref 32–75)
NITRITE UR QL STRIP.AUTO: NEGATIVE
NRBC # BLD: 0 K/UL (ref 0–0.01)
NRBC BLD-RTO: 0 PER 100 WBC
PH UR STRIP: 8.5 [PH] (ref 5–8)
PLATELET # BLD AUTO: 375 K/UL (ref 150–400)
PMV BLD AUTO: 8.8 FL (ref 8.9–12.9)
POTASSIUM SERPL-SCNC: 3.5 MMOL/L (ref 3.5–5.1)
POTASSIUM SERPL-SCNC: 3.9 MMOL/L (ref 3.5–5.1)
PROT SERPL-MCNC: 6.7 G/DL (ref 6.4–8.2)
PROT SERPL-MCNC: 7.2 G/DL (ref 6.4–8.2)
PROT UR STRIP-MCNC: NEGATIVE MG/DL
RBC # BLD AUTO: 4.09 M/UL (ref 3.8–5.2)
SARS-COV-2, COV2: NOT DETECTED
SERVICE CMNT-IMP: ABNORMAL
SODIUM SERPL-SCNC: 136 MMOL/L (ref 136–145)
SODIUM SERPL-SCNC: 139 MMOL/L (ref 136–145)
SP GR UR REFRACTOMETRY: 1.01
TROPONIN-HIGH SENSITIVITY: 7 NG/L (ref 0–51)
UROBILINOGEN UR QL STRIP.AUTO: 1 EU/DL (ref 0.2–1)
WBC # BLD AUTO: 8.8 K/UL (ref 3.6–11)

## 2022-06-02 PROCEDURE — 83605 ASSAY OF LACTIC ACID: CPT

## 2022-06-02 PROCEDURE — 84484 ASSAY OF TROPONIN QUANT: CPT

## 2022-06-02 PROCEDURE — 80235 DRUG ASSAY LACOSAMIDE: CPT

## 2022-06-02 PROCEDURE — 81003 URINALYSIS AUTO W/O SCOPE: CPT

## 2022-06-02 PROCEDURE — 99285 EMERGENCY DEPT VISIT HI MDM: CPT

## 2022-06-02 PROCEDURE — 96365 THER/PROPH/DIAG IV INF INIT: CPT

## 2022-06-02 PROCEDURE — G0378 HOSPITAL OBSERVATION PER HR: HCPCS

## 2022-06-02 PROCEDURE — 80053 COMPREHEN METABOLIC PANEL: CPT

## 2022-06-02 PROCEDURE — 96361 HYDRATE IV INFUSION ADD-ON: CPT

## 2022-06-02 PROCEDURE — 93005 ELECTROCARDIOGRAM TRACING: CPT

## 2022-06-02 PROCEDURE — 87040 BLOOD CULTURE FOR BACTERIA: CPT

## 2022-06-02 PROCEDURE — 94760 N-INVAS EAR/PLS OXIMETRY 1: CPT

## 2022-06-02 PROCEDURE — 95816 EEG AWAKE AND DROWSY: CPT | Performed by: STUDENT IN AN ORGANIZED HEALTH CARE EDUCATION/TRAINING PROGRAM

## 2022-06-02 PROCEDURE — 96375 TX/PRO/DX INJ NEW DRUG ADDON: CPT

## 2022-06-02 PROCEDURE — 85025 COMPLETE CBC W/AUTO DIFF WBC: CPT

## 2022-06-02 PROCEDURE — 74011250636 HC RX REV CODE- 250/636: Performed by: STUDENT IN AN ORGANIZED HEALTH CARE EDUCATION/TRAINING PROGRAM

## 2022-06-02 PROCEDURE — 87636 SARSCOV2 & INF A&B AMP PRB: CPT

## 2022-06-02 PROCEDURE — 36415 COLL VENOUS BLD VENIPUNCTURE: CPT

## 2022-06-02 PROCEDURE — 70450 CT HEAD/BRAIN W/O DYE: CPT

## 2022-06-02 PROCEDURE — 82962 GLUCOSE BLOOD TEST: CPT

## 2022-06-02 PROCEDURE — 74011000258 HC RX REV CODE- 258: Performed by: STUDENT IN AN ORGANIZED HEALTH CARE EDUCATION/TRAINING PROGRAM

## 2022-06-02 PROCEDURE — C9254 INJECTION, LACOSAMIDE: HCPCS | Performed by: STUDENT IN AN ORGANIZED HEALTH CARE EDUCATION/TRAINING PROGRAM

## 2022-06-02 PROCEDURE — 80177 DRUG SCRN QUAN LEVETIRACETAM: CPT

## 2022-06-02 PROCEDURE — 71045 X-RAY EXAM CHEST 1 VIEW: CPT

## 2022-06-02 PROCEDURE — 65270000029 HC RM PRIVATE

## 2022-06-02 PROCEDURE — 99223 1ST HOSP IP/OBS HIGH 75: CPT | Performed by: PSYCHIATRY & NEUROLOGY

## 2022-06-02 PROCEDURE — 74011000250 HC RX REV CODE- 250: Performed by: STUDENT IN AN ORGANIZED HEALTH CARE EDUCATION/TRAINING PROGRAM

## 2022-06-02 RX ORDER — POLYETHYLENE GLYCOL 3350 17 G/17G
17 POWDER, FOR SOLUTION ORAL DAILY PRN
Status: DISCONTINUED | OUTPATIENT
Start: 2022-06-02 | End: 2022-06-04 | Stop reason: HOSPADM

## 2022-06-02 RX ORDER — SODIUM CHLORIDE 9 MG/ML
50 INJECTION, SOLUTION INTRAVENOUS CONTINUOUS
Status: DISCONTINUED | OUTPATIENT
Start: 2022-06-02 | End: 2022-06-03

## 2022-06-02 RX ORDER — LEVETIRACETAM 15 MG/ML
1500 INJECTION INTRAVASCULAR EVERY 12 HOURS
Status: DISCONTINUED | OUTPATIENT
Start: 2022-06-02 | End: 2022-06-02

## 2022-06-02 RX ORDER — SODIUM CHLORIDE 0.9 % (FLUSH) 0.9 %
5-40 SYRINGE (ML) INJECTION AS NEEDED
Status: DISCONTINUED | OUTPATIENT
Start: 2022-06-02 | End: 2022-06-04 | Stop reason: HOSPADM

## 2022-06-02 RX ORDER — ONDANSETRON 2 MG/ML
4 INJECTION INTRAMUSCULAR; INTRAVENOUS
Status: DISCONTINUED | OUTPATIENT
Start: 2022-06-02 | End: 2022-06-04 | Stop reason: HOSPADM

## 2022-06-02 RX ORDER — ACETAMINOPHEN 650 MG/1
650 SUPPOSITORY RECTAL
Status: DISCONTINUED | OUTPATIENT
Start: 2022-06-02 | End: 2022-06-03

## 2022-06-02 RX ORDER — LORAZEPAM 2 MG/ML
1 INJECTION INTRAMUSCULAR
Status: DISCONTINUED | OUTPATIENT
Start: 2022-06-02 | End: 2022-06-04 | Stop reason: HOSPADM

## 2022-06-02 RX ORDER — SODIUM CHLORIDE 0.9 % (FLUSH) 0.9 %
5-40 SYRINGE (ML) INJECTION EVERY 8 HOURS
Status: DISCONTINUED | OUTPATIENT
Start: 2022-06-02 | End: 2022-06-04 | Stop reason: HOSPADM

## 2022-06-02 RX ORDER — MELATONIN
1000 2 TIMES DAILY
COMMUNITY
End: 2022-09-13 | Stop reason: SDUPTHER

## 2022-06-02 RX ORDER — ONDANSETRON 4 MG/1
4 TABLET, ORALLY DISINTEGRATING ORAL
Status: DISCONTINUED | OUTPATIENT
Start: 2022-06-02 | End: 2022-06-04 | Stop reason: HOSPADM

## 2022-06-02 RX ORDER — SODIUM CHLORIDE 0.9 % (FLUSH) 0.9 %
5-10 SYRINGE (ML) INJECTION AS NEEDED
Status: DISCONTINUED | OUTPATIENT
Start: 2022-06-02 | End: 2022-06-04 | Stop reason: HOSPADM

## 2022-06-02 RX ORDER — LORAZEPAM 2 MG/ML
1 INJECTION INTRAMUSCULAR
Status: COMPLETED | OUTPATIENT
Start: 2022-06-02 | End: 2022-06-02

## 2022-06-02 RX ORDER — ENOXAPARIN SODIUM 100 MG/ML
1 INJECTION SUBCUTANEOUS EVERY 12 HOURS
Status: DISCONTINUED | OUTPATIENT
Start: 2022-06-02 | End: 2022-06-04

## 2022-06-02 RX ORDER — ACETAMINOPHEN 325 MG/1
650 TABLET ORAL
Status: DISCONTINUED | OUTPATIENT
Start: 2022-06-02 | End: 2022-06-03

## 2022-06-02 RX ADMIN — SODIUM CHLORIDE 662 ML: 9 INJECTION, SOLUTION INTRAVENOUS at 18:28

## 2022-06-02 RX ADMIN — LORAZEPAM 1 MG: 2 INJECTION INTRAMUSCULAR; INTRAVENOUS at 09:44

## 2022-06-02 RX ADMIN — LEVETIRACETAM 1000 MG: 100 INJECTION, SOLUTION, CONCENTRATE INTRAVENOUS at 09:44

## 2022-06-02 RX ADMIN — SODIUM CHLORIDE 1000 ML: 9 INJECTION, SOLUTION INTRAVENOUS at 17:19

## 2022-06-02 RX ADMIN — SODIUM CHLORIDE 150 MG: 9 INJECTION, SOLUTION INTRAVENOUS at 18:22

## 2022-06-02 RX ADMIN — SODIUM CHLORIDE, PRESERVATIVE FREE 10 ML: 5 INJECTION INTRAVENOUS at 22:00

## 2022-06-02 RX ADMIN — SODIUM CHLORIDE, PRESERVATIVE FREE 10 ML: 5 INJECTION INTRAVENOUS at 15:28

## 2022-06-02 RX ADMIN — SODIUM CHLORIDE 1000 ML: 9 INJECTION, SOLUTION INTRAVENOUS at 09:47

## 2022-06-02 RX ADMIN — SODIUM CHLORIDE 50 ML/HR: 9 INJECTION, SOLUTION INTRAVENOUS at 18:26

## 2022-06-02 NOTE — PROGRESS NOTES
[facility]: MED RECONCILIATION    Comments/Recommendations:   Med rec performed via interview with patient's daughter who helps manage her medications. Per daughter, patient has been compliant with seizure medications. Daughter reports she recently ran out of apixaban so her last dose was 5/30 AM. Patient takes for history of DVT per daughter. Allergies: Patient has no known allergies. Prior to Admission Medications:   Prior to Admission Medications   Prescriptions Last Dose Informant Patient Reported? Taking? Eliquis 5 mg tablet 5/30/2022 at AM  No Yes   Sig: TAKE 1 TABLET BY MOUTH TWO (2) TIMES A DAY. Vimpat 150 mg tab tablet 6/1/2022 at PM  No Yes   Sig: TAKE 1 TABLET BY MOUTH 2 TIMES A DAY. MAX DAILY AMOUNT: 300 MG.   acetaminophen (TYLENOL) 325 mg tablet   No Yes   Sig: Take 2 Tablets by mouth every six (6) hours as needed for Pain. ascorbic acid, vitamin C, (VITAMIN C) 500 mg tablet   No Yes   Sig: TAKE 1 TABLET BY MOUTH TWICE A DAY   cholecalciferol (VITAMIN D3) (1000 Units /25 mcg) tablet   Yes Yes   Sig: Take 1,000 Units by mouth two (2) times a day. ferrous sulfate (IRON, FERROUS SULFATE,) 325 mg (65 mg iron) tablet   No Yes   Sig: Take 1 Tab by mouth two (2) times a day.    levETIRAcetam (KEPPRA) 750 mg tablet 6/1/2022 at PM  No Yes   Sig: TAKE 2 TABLETS BY MOUTH EVERY 12 HOURS      Facility-Administered Medications: None             Cristhian Trotter PHARMD   Contact: [phone]

## 2022-06-02 NOTE — ED NOTES
Patient remains sleepy after being medicated. No obvious signs of distress or seizure activity. Family at  Bedside with patient.

## 2022-06-02 NOTE — PROGRESS NOTES
1522 Patient came up from ED via stretcher. VS obtained. Database complete. 1534 Dual skin assessment and admission assessment completed. Skin assessment completed with Yana Patten, RN    1620 Covid swab collected and taken down to lab for processing. Attempting to get a new line at this time. 3700 [facility] ED nurse came and placed 2 ultrasound guided IVs.     1719 1000 mL bolus of NS initiated to KATHRINE PIV. Patient resting comfortable. 1818 Patients repeat EKG completed by [facility].

## 2022-06-02 NOTE — H&P
Hospitalist Admission Note    NAME: Alyce Oliveira   :  1945   MRN:  [id number]   Room Number: 512/87  @ [facility]     Date/Time:  2022 4:53 PM    Patient PCP: Aniyah John MD  ______________________________________________________________________  Given the patient's current clinical presentation, I have a high level of concern for decompensation if discharged from the emergency department. Complex decision making was performed, which includes reviewing the patient's available past medical records, laboratory results, and x-ray films. My assessment of this patient's clinical condition and my plan of care is as follows. Assessment / Plan: Active Problems:    Seizure ([address] (2018)      Seizure POA  Altered mental status POA due to   Post-ictal state POA  Likely due to Ativan administration and post ictal state. CT head interpreted independently- no evidence of temporal enhancement. Patient compliant with medications    - Resume keppra and lacosamide IV  - Ativan PRN for seizure  - Seizure precautions  - Neurology consult  - need to rule out CNS infection. LP ordered       Fever POA  UA negative for infection, CXR showed no acute process. CT head interpreted independently- no evidence of temporal enhancement. - administer fluids  - Lactic acid  - Blood Cx x 2  - COVID, influenza check  - Meningitis panel. LP ordered     DVT POA  On eliquis at home,last dose 2022  - lovenox ordered, held due to LP  - SCD      Microcytic hypochromic anemia POA  Likely At baseline  - resume iron supplementation when able to tolerate oral intake       Body mass index is 20.98 kg/m².   Code Status: full   Surrogate Decision Maker:  Alyce Oliveira cell [phone], another cell [phone],  home [phone]    DVT Prophylaxis: Lovenox  GI Prophylaxis: not indicated  Baseline: ambulatory with assistive device         Subjective:   CHIEF COMPLAINT: seizures    HISTORY OF PRESENT ILLNESS:     Cruz Gibson is a 76 y.o.  female with PMH of seizures,anemia, DVT who presents to ED via EMS due to witnessed seizures. Patient currently has altered mental status and unable to provide meaningful history. I spoke with Cruz Gibson who provided history. Patient  had witnessed generalized tonic clonic seizure at 8:28 AM while still in the bed. No bowel bladder incontinence. Daughter noticed patient was asking for help before the seizure which is he usual behavior before having a seizure. Patient was c/o chills, cough, cold like symptoms for 2-3 Patient compliant with medications. Seizures are usually more prevalent in hot weather. Patient has had 1 dose of covid vaccine. She had another seizure en-route. We were asked to admit for work up and evaluation of the above problems. Past Medical History:   Diagnosis Date    Anemia     Arthritis     Asthma     Headache     Seizures ([address]         Past Surgical History:   Procedure Laterality Date    NEUROLOGICAL PROCEDURE UNLISTED         Social History     Tobacco Use    Smoking status: Heavy Tobacco Smoker     Packs/day: 0.00     Types: Cigarettes    Smokeless tobacco: Never Used   Substance Use Topics    Alcohol use: No        History reviewed. No pertinent family history. No Known Allergies     Prior to Admission medications    Medication Sig Start Date End Date Taking? Authorizing Provider   cholecalciferol (VITAMIN D3) (1000 Units /25 mcg) tablet Take 1,000 Units by mouth two (2) times a day. Yes Provider, Historical   apixaban (Eliquis) 5 mg tablet Take 5 mg by mouth two (2) times a day.  Indications: blood clot in a deep vein of the extremities   Yes Provider, Historical   levETIRAcetam (KEPPRA) 750 mg tablet TAKE 2 TABLETS BY MOUTH EVERY 12 HOURS 2/22/22  Yes Aleshia Portillo., MD   ascorbic acid, vitamin C, (VITAMIN C) 500 mg tablet TAKE 1 TABLET BY MOUTH TWICE A DAY 2/22/22  Yes Myrna Turner Christianne Arriola MD   Vimpat 150 mg tab tablet TAKE 1 TABLET BY MOUTH 2 TIMES A DAY. MAX DAILY AMOUNT: 300 MG. 1/5/22  Yes Louisa Ordoñez MD   acetaminophen (TYLENOL) 325 mg tablet Take 2 Tablets by mouth every six (6) hours as needed for Pain. 7/7/21  Yes Louisa Ordoñez MD   ferrous sulfate (IRON, FERROUS SULFATE,) 325 mg (65 mg iron) tablet Take 1 Tab by mouth two (2) times a day. 7/28/18  Yes Félix Vargas MD       REVIEW OF SYSTEMS:     I am not able to complete the review of systems because:    The patient is intubated and sedated   x The patient has altered mental status due to his acute medical problems    The patient has baseline aphasia from prior stroke(s)    The patient has baseline dementia and is not reliable historian    The patient is in acute medical distress and unable to provide information           Total of 12 systems reviewed as follows:       POSITIVE= underlined text  Negative = text not underlined  General:  fever, chills, sweats, generalized weakness, weight loss/gain,      loss of appetite   Eyes:    blurred vision, eye pain, loss of vision, double vision  ENT:    rhinorrhea, pharyngitis   Respiratory:   cough, sputum production, SOB, GREENE, wheezing, pleuritic pain   Cardiology:   chest pain, palpitations, orthopnea, PND, edema, syncope   Gastrointestinal:  abdominal pain , N/V, diarrhea, dysphagia, constipation, bleeding   Genitourinary:  frequency, urgency, dysuria, hematuria, incontinence   Muskuloskeletal :  arthralgia, myalgia, back pain  Hematology:  easy bruising, nose or gum bleeding, lymphadenopathy   Dermatological: rash, ulceration, pruritis, color change / jaundice  Endocrine:   hot flashes or polydipsia   Neurological:  headache, dizziness, confusion, focal weakness, paresthesia,     Speech difficulties, memory loss, gait difficulty  Psychological: Feelings of anxiety, depression, agitation    Objective:   VITALS:    Visit Vitals  /77 (BP 1 Location: Right upper arm, BP Patient Position: Sitting)   Pulse 93   Temp (!) 101.7 °F (38.7 °C)   Resp 16   Ht 5' 4\" (1.626 m)   Wt 55.4 kg (122 lb 3.2 oz)   SpO2 98%   BMI 20.98 kg/m²       PHYSICAL EXAM:    General:    Sleepy but cooperative, no distress, appears stated age. HEENT: Atraumatic, anicteric sclerae, pink conjunctivae     No oral ulcers, mucosa moist, throat clear, dentition fair  Neck:  Supple, symmetrical,  thyroid: non tender  Lungs:   Clear to auscultation bilaterally. No Wheezing or Rhonchi. No rales. Chest wall:  No tenderness  No Accessory muscle use. Heart:   Regular  rhythm,  No  murmur   No edema  Abdomen:   Soft, non-tender. Not distended. Bowel sounds normal  Extremities: No cyanosis. No clubbing,      Skin turgor normal, Capillary refill normal, Radial dial pulse 2+  Skin:     Not pale. Not Jaundiced  No rashes   Psych:  poor insight. Not depressed. Not anxious or agitated. Neurologic: EOMs intact. No facial asymmetry. No aphasia or slurred speech. Moving all extremities spontaneously. Oriented to self only    ______________________________________________________________________    Care Plan discussed with:  Patient/Family, Nurse and     Expected  Disposition:  Home w/Family  ________________________________________________________________________  TOTAL TIME:  35 Minutes    Critical Care Provided     Minutes non procedure based      Comments    x Reviewed previous records   >50% of visit spent in counseling and coordination of care x Discussion with patient and/or family and questions answered       ________________________________________________________________________  Signed: Any Hannah MD    Procedures: see electronic medical records for all procedures/Xrays and details which were not copied into this note but were reviewed prior to creation of Plan.     LAB DATA REVIEWED:    Recent Results (from the past 24 hour(s))   GLUCOSE, POC    Collection Time: 06/02/22  9:13 AM   Result Value Ref Range    Glucose (POC) 119 (H) 65 - 117 mg/dL    Performed by Jazzy Asher    CBC WITH AUTOMATED DIFF    Collection Time: 06/02/22  9:20 AM   Result Value Ref Range    WBC 8.8 3.6 - 11.0 K/uL    RBC 4.09 3.80 - 5.20 M/uL    HGB 9.6 (L) 11.5 - 16.0 g/dL    HCT 32.4 (L) 35.0 - 47.0 %    MCV 79.2 (L) 80.0 - 99.0 FL    MCH 23.5 (L) 26.0 - 34.0 PG    MCHC 29.6 (L) 30.0 - 36.5 g/dL    RDW 16.4 (H) 11.5 - 14.5 %    PLATELET 940 026 - 925 K/uL    MPV 8.8 (L) 8.9 - 12.9 FL    NRBC 0.0 0  WBC    ABSOLUTE NRBC 0.00 0.00 - 0.01 K/uL    NEUTROPHILS 81 (H) 32 - 75 %    LYMPHOCYTES 13 12 - 49 %    MONOCYTES 5 5 - 13 %    EOSINOPHILS 1 0 - 7 %    BASOPHILS 0 0 - 1 %    IMMATURE GRANULOCYTES 0 0.0 - 0.5 %    ABS. NEUTROPHILS 7.1 1.8 - 8.0 K/UL    ABS. LYMPHOCYTES 1.2 0.8 - 3.5 K/UL    ABS. MONOCYTES 0.5 0.0 - 1.0 K/UL    ABS. EOSINOPHILS 0.0 0.0 - 0.4 K/UL    ABS. BASOPHILS 0.0 0.0 - 0.1 K/UL    ABS. IMM. GRANS. 0.0 0.00 - 0.04 K/UL    DF AUTOMATED     METABOLIC PANEL, COMPREHENSIVE    Collection Time: 06/02/22  9:20 AM   Result Value Ref Range    Sodium 136 136 - 145 mmol/L    Potassium 3.9 3.5 - 5.1 mmol/L    Chloride 101 97 - 108 mmol/L    CO2 31 21 - 32 mmol/L    Anion gap 4 (L) 5 - 15 mmol/L    Glucose 117 (H) 65 - 100 mg/dL    BUN 7 6 - 20 MG/DL    Creatinine 0.82 0.55 - 1.02 MG/DL    BUN/Creatinine ratio 9 (L) 12 - 20      GFR est AA >60 >60 ml/min/1.73m2    GFR est non-AA >60 >60 ml/min/1.73m2    Calcium 8.8 8.5 - 10.1 MG/DL    Bilirubin, total 0.4 0.2 - 1.0 MG/DL    ALT (SGPT) 11 (L) 12 - 78 U/L    AST (SGOT) 16 15 - 37 U/L    Alk.  phosphatase 87 45 - 117 U/L    Protein, total 7.2 6.4 - 8.2 g/dL    Albumin 3.2 (L) 3.5 - 5.0 g/dL    Globulin 4.0 2.0 - 4.0 g/dL    A-G Ratio 0.8 (L) 1.1 - 2.2     TROPONIN-HIGH SENSITIVITY    Collection Time: 06/02/22  9:20 AM   Result Value Ref Range    Troponin-High Sensitivity 7 0 - 51 ng/L   URINALYSIS W/ RFLX MICROSCOPIC    Collection Time: 06/02/22 10:39 AM   Result Value Ref Range    Color YELLOW/STRAW      Appearance CLEAR CLEAR      Specific gravity 1.010      pH (UA) 8.5 (H) 5.0 - 8.0      Protein Negative NEG mg/dL    Glucose Negative NEG mg/dL    Ketone Negative NEG mg/dL    Bilirubin Negative NEG      Blood Negative NEG      Urobilinogen 1.0 0.2 - 1.0 EU/dL    Nitrites Negative NEG      Leukocyte Esterase Negative NEG     COVID-19 WITH INFLUENZA A/B    Collection Time: 06/02/22  4:18 PM   Result Value Ref Range    SARS-CoV-2 by PCR Not detected NOTD      Influenza A by PCR Not detected      Influenza B by PCR Not detected

## 2022-06-02 NOTE — PROGRESS NOTES
1448:  TRANSFER - IN REPORT:    Verbal report received from [id number], RN on [facility]  being received from ER or routine progression of care      Report consisted of patients Situation, Background, Assessment and   Recommendations(SBAR). Report also included review of accordion report, results review, orders, meds, ROS, and POC. IOpportunity for questions and clarification was provided. Pt to be admitted to  208, room set up for dx seizure admission, awaiting pt arrival to floor. 1500  Verbal report given to Lisandro Khan RN (primary nurse assigned to admission). Report included review of SBAR, accordion report, results review, orders, meds, ROS, and POC. All questions answered. Transfer of care complete.

## 2022-06-02 NOTE — ED TRIAGE NOTES
Patient arrives to ED from home via EMS for c/o witnessed seizure by family at 200am. Per EMS, unknown how long seizure lasted, but endorses patient has a history of seizures. Patient arrives dry heaving. Responds to verbal stimuli.

## 2022-06-02 NOTE — ED NOTES
Pt daughter name is Lorenzo Bullock and can be reached at cell [phone], another cell [phone] and [place] [phone].

## 2022-06-02 NOTE — ED NOTES
TRANSFER - OUT REPORT:    Verbal report given to [place], RN on Seb Rust  being transferred to Med Surg (unit) for routine progression of care       Report consisted of patients Situation, Background, Assessment and   Recommendations(SBAR). Information from the following report(s) SBAR, ED Summary, Procedure Summary and MAR was reviewed with the receiving nurse. Lines:   Peripheral IV 06/02/22 Left;Upper Arm (Active)   Site Assessment Clean, dry, & intact 06/02/22 0942   Dressing Type Transparent 06/02/22 0942        Opportunity for questions and clarification was provided.       Patient transported with:   Registered Nurse

## 2022-06-02 NOTE — ED PROVIDER NOTES
EMERGENCY DEPARTMENT HISTORY AND PHYSICAL EXAM      Date: 6/2/2022  Patient Name: Leyla Busby    History of Presenting Illness     Chief Complaint   Patient presents with    Seizure         HPI: Leyla Busby, 76 y.o. female presents to the ED with cc of seizure. History obtained from EMS. Per EMS, patient had a generalized tonic-clonic seizure witnessed at home, and then another 1 in route, unclear how long it lasted. Resolved on its own. Patient is altered, agitated, mumbling words but able to understand what she is saying. Further history unable to be obtained from patient. There are no other complaints, changes, or physical findings at this time. PCP: Mauricio Patricia., MD    No current facility-administered medications on file prior to encounter. Current Outpatient Medications on File Prior to Encounter   Medication Sig Dispense Refill    Eliquis 5 mg tablet TAKE 1 TABLET BY MOUTH TWO (2) TIMES A DAY. 180 Tablet 1    levETIRAcetam (KEPPRA) 750 mg tablet TAKE 2 TABLETS BY MOUTH EVERY 12 HOURS 360 Tablet 1    ascorbic acid, vitamin C, (VITAMIN C) 500 mg tablet TAKE 1 TABLET BY MOUTH TWICE A  Tablet 1    Vimpat 150 mg tab tablet TAKE 1 TABLET BY MOUTH 2 TIMES A DAY. MAX DAILY AMOUNT: 300 MG. 60 Tablet 3    acetaminophen (TYLENOL) 325 mg tablet Take 2 Tablets by mouth every six (6) hours as needed for Pain. 100 Tablet 3    lacosamide (VIMPAT) 150 mg tab tablet 150 mg = 1 tab each dose, PO, bid, # 60 tab, 5 Refills, Pharmacy: [facility]/pharmacy #[id number]     ferrous sulfate (IRON, FERROUS SULFATE,) 325 mg (65 mg iron) tablet Take 1 Tab by mouth two (2) times a day. 90 Tab 0       Past History     Past Medical History:  Past Medical History:   Diagnosis Date    Anemia     Arthritis     Asthma     Headache     Seizures ([address]        Past Surgical History:  Past Surgical History:   Procedure Laterality Date    NEUROLOGICAL PROCEDURE UNLISTED         Family History:  History reviewed. No pertinent family history. Social History:  Social History     Tobacco Use    Smoking status: Heavy Tobacco Smoker     Packs/day: 0.00     Types: Cigarettes    Smokeless tobacco: Never Used   Substance Use Topics    Alcohol use: No    Drug use: No       Allergies:  No Known Allergies      Review of Systems   Unable to obtain given patient's mental status    Physical Exam   Physical Exam  Constitutional:       Comments: Agitated, mumbling words, moving all extremities spontaneously   HENT:      Head: Normocephalic and atraumatic. Mouth/Throat:      Mouth: Mucous membranes are moist.   Eyes:      Extraocular Movements: Extraocular movements intact. Pupils: Pupils are equal, round, and reactive to light. Cardiovascular:      Rate and Rhythm: Normal rate and regular rhythm. Pulmonary:      Effort: Pulmonary effort is normal.      Breath sounds: Normal breath sounds. Abdominal:      General: There is no distension. Palpations: Abdomen is soft. Tenderness: There is no abdominal tenderness. Musculoskeletal:         General: No swelling, tenderness or deformity. Cervical back: Neck supple. Skin:     General: Skin is warm and dry.    Neurological:      Comments: She is alert, will respond to questioning with mumbled words, is agitated but somewhat redirectable, will move all extremities spontaneously with full strength, symmetric facial expressions   Psychiatric:      Comments: Intermittently agitated         Diagnostic Study Results     Labs -     Recent Results (from the past 24 hour(s))   GLUCOSE, POC    Collection Time: 06/02/22  9:13 AM   Result Value Ref Range    Glucose (POC) 119 (H) 65 - 117 mg/dL    Performed by Maryana Jacob        Radiologic Studies -   XR CHEST PORT    (Results Pending)   CT HEAD WO CONT    (Results Pending)     CT Results  (Last 48 hours)    None        CXR Results  (Last 48 hours)    None            Medical Decision Making   I am the first provider for this patient. I reviewed the vital signs, available nursing notes, past medical history, past surgical history, family history and social history. Vital Signs-Reviewed the patient's vital signs. Patient Vitals for the past 24 hrs:   Temp Pulse Resp BP SpO2   06/02/22 0907 98.2 °F (36.8 °C) 79 16 (!) 162/112 91 %         Provider Notes (Medical Decision Making):   20-year-old female presenting after seizure-like activity. Has known history of seizures. Per chart review, takes Keppra. Neuro exam currently is nonfocal, she is postictal appearing. Given that she is on blood thinners and her age and seizure-like activity, will obtain head CT. Will assess for any associated electrolyte or metabolic abnormalities or infection such as UTI that may have precipitated seizure. She is otherwise afebrile and nontoxic-appearing, no neck stiffness, unlikely CNS infection. ED Course:     Initial assessment performed. The patients presenting problems have been discussed, and they are in agreement with the care plan formulated and outlined with them. I have encouraged them to ask questions as they arise throughout their visit. ED Course as of 06/02/22 1400   Thu Jun 02, 2022   6903 Family member now at bedside, states that seizure was witnessed, she did not hit her head, was self resolving generalized tonic-clonic seizure this morning. Family member states that she has been compliant with all doses of her Keppra. Has had a mild cough recently, otherwise has been acting her normal self. No recent fevers, vomiting or diarrhea. States that her seizures are usually generalized tonic-clonic in nature.  [CM]      ED Course User Index  [CM] Warren-Ximena West MD      EKG is performed at 9: 14, shows sinus rhythm at a rate of 88, , QRS 80, QTc 398, axis upright, no ST segment elevation or depression concerning for ACS, T wave inversions in lead V1 through V3, this is interpreted as sinus rhythm with T wave inversions. Patient is given IV fluids and loaded with Keppra IV. Reevaluation, she is having some fine tremors motions of her extremities. She is given 1 mg Ativan IV and this resolved. Her vitals remained stable, however she is somnolent. CBC negative for leukocytosis, UA is not suggestive of UTI, basic metabolic panel normal renal function, no worrisome electrolyte abnormalities, CT head shows no acute process. On reevaluation, she is still somnolent, she is arousable to tactile stimuli and will mumble her name, otherwise does not respond to questioning. Daughter at bedside, states that at baseline she is alert, conversant, ambulates on her own, however in the past she has had prolonged postictal phases that have required hospitalization. She will be admitted. Critical Care Time:         Disposition:  Admit    PLAN:  1. Current Discharge Medication List        2.    Follow-up Information    None       Return to ED if worse     Diagnosis     Clinical Impression: Acute breakthrough seizure with prolonged postictal phase

## 2022-06-02 NOTE — PROGRESS NOTES
Problem: Falls - Risk of  Goal: *Absence of Falls  Description: Document Isatu Garcia Fall Risk and appropriate interventions in the flowsheet.   Outcome: Progressing Towards Goal  Note: Fall Risk Interventions:       Mentation Interventions: Door open when patient unattended,Adequate sleep, hydration, pain control    Medication Interventions: Evaluate medications/consider consulting pharmacy    Elimination Interventions: Call light in reach,Toileting schedule/hourly rounds    History of Falls Interventions: Room close to nurse's station,Door open when patient unattended         Problem: General Infection Care Plan (Adult and Pediatric)  Goal: Improvement in signs and symptoms of infection  Outcome: Progressing Towards Goal     Problem: General Infection Care Plan (Adult and Pediatric)  Goal: *Optimize nutritional status  Outcome: Progressing Towards Goal     Problem: Patient Education: Go to Patient Education Activity  Goal: Patient/Family Education  Outcome: Progressing Towards Goal

## 2022-06-03 ENCOUNTER — APPOINTMENT (OUTPATIENT)
Dept: NON INVASIVE DIAGNOSTICS | Age: 77
DRG: 101 | End: 2022-06-03
Attending: STUDENT IN AN ORGANIZED HEALTH CARE EDUCATION/TRAINING PROGRAM
Payer: MEDICARE

## 2022-06-03 ENCOUNTER — APPOINTMENT (OUTPATIENT)
Dept: VASCULAR SURGERY | Age: 77
DRG: 101 | End: 2022-06-03
Attending: STUDENT IN AN ORGANIZED HEALTH CARE EDUCATION/TRAINING PROGRAM
Payer: MEDICARE

## 2022-06-03 LAB
25(OH)D3 SERPL-MCNC: 9 NG/ML (ref 30–100)
ALBUMIN SERPL-MCNC: 2.9 G/DL (ref 3.5–5)
ALBUMIN/GLOB SERPL: 0.8 {RATIO} (ref 1.1–2.2)
ALP SERPL-CCNC: 83 U/L (ref 45–117)
ALT SERPL-CCNC: 9 U/L (ref 12–78)
ANION GAP SERPL CALC-SCNC: 8 MMOL/L (ref 5–15)
APPEARANCE CSF: ABNORMAL
AST SERPL-CCNC: 16 U/L (ref 15–37)
ATRIAL RATE: 88 BPM
ATRIAL RATE: 88 BPM
BASOPHILS # BLD: 0 K/UL (ref 0–0.1)
BASOPHILS NFR BLD: 1 % (ref 0–1)
BILIRUB DIRECT SERPL-MCNC: 0.1 MG/DL (ref 0–0.2)
BILIRUB SERPL-MCNC: 0.7 MG/DL (ref 0.2–1)
BUN SERPL-MCNC: 6 MG/DL (ref 6–20)
BUN/CREAT SERPL: 8 (ref 12–20)
CALCIUM SERPL-MCNC: 8.5 MG/DL (ref 8.5–10.1)
CALCULATED P AXIS, ECG09: 69 DEGREES
CALCULATED P AXIS, ECG09: 78 DEGREES
CALCULATED R AXIS, ECG10: -12 DEGREES
CALCULATED R AXIS, ECG10: -35 DEGREES
CALCULATED T AXIS, ECG11: 54 DEGREES
CALCULATED T AXIS, ECG11: 8 DEGREES
CHLORIDE SERPL-SCNC: 106 MMOL/L (ref 97–108)
CHOLEST SERPL-MCNC: 170 MG/DL
CK SERPL-CCNC: 229 U/L (ref 26–192)
CO2 SERPL-SCNC: 28 MMOL/L (ref 21–32)
COLOR CSF: ABNORMAL
COLOR SPUN CSF: CLEAR
CREAT SERPL-MCNC: 0.73 MG/DL (ref 0.55–1.02)
DIAGNOSIS, 93000: NORMAL
DIAGNOSIS, 93000: NORMAL
DIFFERENTIAL METHOD BLD: ABNORMAL
ECHO AO ROOT DIAM: 2.5 CM
ECHO AO ROOT INDEX: 1.57 CM/M2
ECHO AV AREA PLAN/BSA: 1.26 CM2/M2
ECHO AV AREA PLAN: 2 CM2
ECHO EST RA PRESSURE: 5 MMHG
ECHO LA DIAMETER INDEX: 1.57 CM/M2
ECHO LA DIAMETER: 2.5 CM
ECHO LA TO AORTIC ROOT RATIO: 1
ECHO LA VOL 4C: 14 ML (ref 22–52)
ECHO LA VOLUME INDEX A4C: 9 ML/M2 (ref 16–34)
ECHO LV EDV A4C: 76 ML
ECHO LV EDV INDEX A4C: 48 ML/M2
ECHO LV EJECTION FRACTION A4C: 67 %
ECHO LV ESV A4C: 25 ML
ECHO LV ESV INDEX A4C: 16 ML/M2
ECHO LV FRACTIONAL SHORTENING: 52 % (ref 28–44)
ECHO LV INTERNAL DIMENSION DIASTOLE INDEX: 2.89 CM/M2
ECHO LV INTERNAL DIMENSION DIASTOLIC: 4.6 CM (ref 3.9–5.3)
ECHO LV INTERNAL DIMENSION SYSTOLIC INDEX: 1.38 CM/M2
ECHO LV INTERNAL DIMENSION SYSTOLIC: 2.2 CM
ECHO LV IVSD: 0.9 CM (ref 0.6–0.9)
ECHO LV MASS 2D: 137.7 G (ref 67–162)
ECHO LV MASS INDEX 2D: 86.6 G/M2 (ref 43–95)
ECHO LV POSTERIOR WALL DIASTOLIC: 0.9 CM (ref 0.6–0.9)
ECHO LV RELATIVE WALL THICKNESS RATIO: 0.39
ECHO LVOT AREA: 2.5 CM2
ECHO LVOT DIAM: 1.8 CM
ECHO LVOT PEAK GRADIENT: 3 MMHG
ECHO LVOT PEAK VELOCITY: 0.9 M/S
ECHO MV A VELOCITY: 0.57 M/S
ECHO MV AREA INDEX PLAN: 2.1 CM2/M2
ECHO MV AREA PHT: 5.3 CM2
ECHO MV AREA PLAN: 3.3 CM2
ECHO MV E DECELERATION TIME (DT): 142.8 MS
ECHO MV E VELOCITY: 0.42 M/S
ECHO MV E/A RATIO: 0.74
ECHO MV MAX VELOCITY: 0.5 M/S
ECHO MV MEAN GRADIENT: 1 MMHG
ECHO MV MEAN VELOCITY: 0.4 M/S
ECHO MV PEAK GRADIENT: 1 MMHG
ECHO MV PRESSURE HALF TIME (PHT): 41.4 MS
ECHO MV VTI: 17.4 CM
ECHO PV MAX VELOCITY: 0.9 M/S
ECHO PV PEAK GRADIENT: 3 MMHG
ECHO RIGHT VENTRICULAR SYSTOLIC PRESSURE (RVSP): 22 MMHG
ECHO TV REGURGITANT MAX VELOCITY: 2.04 M/S
ECHO TV REGURGITANT PEAK GRADIENT: 18 MMHG
EOSINOPHIL # BLD: 0.1 K/UL (ref 0–0.4)
EOSINOPHIL NFR BLD: 1 % (ref 0–7)
ERYTHROCYTE [DISTWIDTH] IN BLOOD BY AUTOMATED COUNT: 16.5 % (ref 11.5–14.5)
EST. AVERAGE GLUCOSE BLD GHB EST-MCNC: 111 MG/DL
GLOBULIN SER CALC-MCNC: 3.8 G/DL (ref 2–4)
GLUCOSE BLD STRIP.AUTO-MCNC: 110 MG/DL (ref 65–117)
GLUCOSE SERPL-MCNC: 96 MG/DL (ref 65–100)
HBA1C MFR BLD: 5.5 % (ref 4–5.6)
HCT VFR BLD AUTO: 30.1 % (ref 35–47)
HDLC SERPL-MCNC: 76 MG/DL
HDLC SERPL: 2.2 {RATIO} (ref 0–5)
HGB BLD-MCNC: 9.1 G/DL (ref 11.5–16)
IMM GRANULOCYTES # BLD AUTO: 0 K/UL (ref 0–0.04)
IMM GRANULOCYTES NFR BLD AUTO: 0 % (ref 0–0.5)
LDLC SERPL CALC-MCNC: 77.6 MG/DL (ref 0–100)
LEFT CCA DIST DIAS: 12.5 CM/S
LEFT CCA DIST SYS: 50 CM/S
LEFT CCA PROX DIAS: 8.7 CM/S
LEFT CCA PROX SYS: 68.7 CM/S
LEFT ECA DIAS: 0 CM/S
LEFT ECA SYS: 33.7 CM/S
LEFT ICA DIST DIAS: 18.7 CM/S
LEFT ICA DIST SYS: 71.2 CM/S
LEFT ICA MID DIAS: 21.2 CM/S
LEFT ICA MID SYS: 71.2 CM/S
LEFT ICA PROX DIAS: 28.7 CM/S
LEFT ICA PROX SYS: 96.2 CM/S
LEFT ICA/CCA SYS: 1.4 NO UNITS
LEFT VERTEBRAL DIAS: 14.07 CM/S
LEFT VERTEBRAL SYS: 52.1 CM/S
LYMPHOCYTES # BLD: 1.6 K/UL (ref 0.8–3.5)
LYMPHOCYTES NFR BLD: 22 % (ref 12–49)
LYMPHOCYTES NFR CSF MANUAL: 17 % (ref 28–96)
MAGNESIUM SERPL-MCNC: 1.8 MG/DL (ref 1.6–2.4)
MCH RBC QN AUTO: 23.9 PG (ref 26–34)
MCHC RBC AUTO-ENTMCNC: 30.2 G/DL (ref 30–36.5)
MCV RBC AUTO: 79 FL (ref 80–99)
MONOCYTES # BLD: 0.8 K/UL (ref 0–1)
MONOCYTES NFR BLD: 10 % (ref 5–13)
MONOCYTES NFR CSF MANUAL: 2 % (ref 16–56)
NEUTROPHILS NFR CSF MANUAL: 81 % (ref 0–7)
NEUTS SEG # BLD: 4.9 K/UL (ref 1.8–8)
NEUTS SEG NFR BLD: 66 % (ref 32–75)
NRBC # BLD: 0 K/UL (ref 0–0.01)
NRBC BLD-RTO: 0 PER 100 WBC
P-R INTERVAL, ECG05: 126 MS
P-R INTERVAL, ECG05: 152 MS
PHOSPHATE SERPL-MCNC: 2.7 MG/DL (ref 2.6–4.7)
PLATELET # BLD AUTO: 296 K/UL (ref 150–400)
PMV BLD AUTO: 8.6 FL (ref 8.9–12.9)
POTASSIUM SERPL-SCNC: 3.5 MMOL/L (ref 3.5–5.1)
PROT SERPL-MCNC: 6.7 G/DL (ref 6.4–8.2)
Q-T INTERVAL, ECG07: 322 MS
Q-T INTERVAL, ECG07: 384 MS
QRS DURATION, ECG06: 80 MS
QRS DURATION, ECG06: 80 MS
QTC CALCULATION (BEZET), ECG08: 389 MS
QTC CALCULATION (BEZET), ECG08: 464 MS
RBC # BLD AUTO: 3.81 M/UL (ref 3.8–5.2)
RBC # CSF: 6588 /CU MM
RIGHT CCA DIST DIAS: 19.7 CM/S
RIGHT CCA DIST SYS: 78.7 CM/S
RIGHT CCA PROX DIAS: 16.4 CM/S
RIGHT CCA PROX SYS: 74.4 CM/S
RIGHT ECA DIAS: 5.47 CM/S
RIGHT ECA SYS: 58 CM/S
RIGHT ICA DIST DIAS: 17.9 CM/S
RIGHT ICA DIST SYS: 59 CM/S
RIGHT ICA MID DIAS: 0 CM/S
RIGHT ICA MID SYS: 34.7 CM/S
RIGHT ICA PROX DIAS: 14.4 CM/S
RIGHT ICA PROX SYS: 59.9 CM/S
RIGHT ICA/CCA SYS: 0.8 NO UNITS
RIGHT VERTEBRAL DIAS: 14.99 CM/S
RIGHT VERTEBRAL SYS: 45 CM/S
SERVICE CMNT-IMP: NORMAL
SODIUM SERPL-SCNC: 142 MMOL/L (ref 136–145)
TRIGL SERPL-MCNC: 82 MG/DL (ref ?–150)
TUBE # CSF: 3
VENTRICULAR RATE, ECG03: 88 BPM
VENTRICULAR RATE, ECG03: 88 BPM
VLDLC SERPL CALC-MCNC: 16.4 MG/DL
WBC # BLD AUTO: 7.3 K/UL (ref 3.6–11)
WBC # CSF: 33 /CU MM (ref 0–5)

## 2022-06-03 PROCEDURE — 84157 ASSAY OF PROTEIN OTHER: CPT

## 2022-06-03 PROCEDURE — 83036 HEMOGLOBIN GLYCOSYLATED A1C: CPT

## 2022-06-03 PROCEDURE — 74011250637 HC RX REV CODE- 250/637: Performed by: STUDENT IN AN ORGANIZED HEALTH CARE EDUCATION/TRAINING PROGRAM

## 2022-06-03 PROCEDURE — 83735 ASSAY OF MAGNESIUM: CPT

## 2022-06-03 PROCEDURE — 84207 ASSAY OF VITAMIN B-6: CPT

## 2022-06-03 PROCEDURE — 51798 US URINE CAPACITY MEASURE: CPT

## 2022-06-03 PROCEDURE — 84100 ASSAY OF PHOSPHORUS: CPT

## 2022-06-03 PROCEDURE — 82962 GLUCOSE BLOOD TEST: CPT

## 2022-06-03 PROCEDURE — 93880 EXTRACRANIAL BILAT STUDY: CPT | Performed by: INTERNAL MEDICINE

## 2022-06-03 PROCEDURE — 82550 ASSAY OF CK (CPK): CPT

## 2022-06-03 PROCEDURE — 87205 SMEAR GRAM STAIN: CPT

## 2022-06-03 PROCEDURE — 65270000029 HC RM PRIVATE

## 2022-06-03 PROCEDURE — 97116 GAIT TRAINING THERAPY: CPT | Performed by: PHYSICAL THERAPIST

## 2022-06-03 PROCEDURE — 74011000250 HC RX REV CODE- 250: Performed by: STUDENT IN AN ORGANIZED HEALTH CARE EDUCATION/TRAINING PROGRAM

## 2022-06-03 PROCEDURE — 74011250637 HC RX REV CODE- 250/637: Performed by: PSYCHIATRY & NEUROLOGY

## 2022-06-03 PROCEDURE — 93306 TTE W/DOPPLER COMPLETE: CPT

## 2022-06-03 PROCEDURE — 80061 LIPID PANEL: CPT

## 2022-06-03 PROCEDURE — 74011250636 HC RX REV CODE- 250/636: Performed by: STUDENT IN AN ORGANIZED HEALTH CARE EDUCATION/TRAINING PROGRAM

## 2022-06-03 PROCEDURE — 36415 COLL VENOUS BLD VENIPUNCTURE: CPT

## 2022-06-03 PROCEDURE — 97161 PT EVAL LOW COMPLEX 20 MIN: CPT | Performed by: PHYSICAL THERAPIST

## 2022-06-03 PROCEDURE — 89050 BODY FLUID CELL COUNT: CPT

## 2022-06-03 PROCEDURE — 80048 BASIC METABOLIC PNL TOTAL CA: CPT

## 2022-06-03 PROCEDURE — 93880 EXTRACRANIAL BILAT STUDY: CPT

## 2022-06-03 PROCEDURE — 93306 TTE W/DOPPLER COMPLETE: CPT | Performed by: INTERNAL MEDICINE

## 2022-06-03 PROCEDURE — 85025 COMPLETE CBC W/AUTO DIFF WBC: CPT

## 2022-06-03 PROCEDURE — 82945 GLUCOSE OTHER FLUID: CPT

## 2022-06-03 PROCEDURE — 94760 N-INVAS EAR/PLS OXIMETRY 1: CPT

## 2022-06-03 PROCEDURE — G0378 HOSPITAL OBSERVATION PER HR: HCPCS

## 2022-06-03 PROCEDURE — 74011000258 HC RX REV CODE- 258: Performed by: STUDENT IN AN ORGANIZED HEALTH CARE EDUCATION/TRAINING PROGRAM

## 2022-06-03 PROCEDURE — 95819 EEG AWAKE AND ASLEEP: CPT | Performed by: PSYCHIATRY & NEUROLOGY

## 2022-06-03 PROCEDURE — 87483 CNS DNA AMP PROBE TYPE 12-25: CPT

## 2022-06-03 PROCEDURE — 82306 VITAMIN D 25 HYDROXY: CPT

## 2022-06-03 PROCEDURE — 80076 HEPATIC FUNCTION PANEL: CPT

## 2022-06-03 PROCEDURE — C9254 INJECTION, LACOSAMIDE: HCPCS | Performed by: STUDENT IN AN ORGANIZED HEALTH CARE EDUCATION/TRAINING PROGRAM

## 2022-06-03 RX ORDER — ACETAMINOPHEN 325 MG/1
650 TABLET ORAL
Status: DISCONTINUED | OUTPATIENT
Start: 2022-06-03 | End: 2022-06-04 | Stop reason: HOSPADM

## 2022-06-03 RX ORDER — ACETAMINOPHEN 650 MG/1
650 SUPPOSITORY RECTAL
Status: DISCONTINUED | OUTPATIENT
Start: 2022-06-03 | End: 2022-06-04 | Stop reason: HOSPADM

## 2022-06-03 RX ORDER — ACETAMINOPHEN 650 MG/1
650 SUPPOSITORY RECTAL
Status: DISCONTINUED | OUTPATIENT
Start: 2022-06-03 | End: 2022-06-03

## 2022-06-03 RX ORDER — GUAIFENESIN 100 MG/5ML
81 LIQUID (ML) ORAL DAILY
Status: DISCONTINUED | OUTPATIENT
Start: 2022-06-03 | End: 2022-06-04 | Stop reason: HOSPADM

## 2022-06-03 RX ORDER — LACOSAMIDE 50 MG/1
150 TABLET ORAL EVERY 12 HOURS
Status: DISCONTINUED | OUTPATIENT
Start: 2022-06-03 | End: 2022-06-04 | Stop reason: HOSPADM

## 2022-06-03 RX ORDER — ACETAMINOPHEN 325 MG/1
650 TABLET ORAL
Status: DISCONTINUED | OUTPATIENT
Start: 2022-06-03 | End: 2022-06-03

## 2022-06-03 RX ORDER — SODIUM CHLORIDE 0.9 % (FLUSH) 0.9 %
10 SYRINGE (ML) INJECTION AS NEEDED
Status: DISCONTINUED | OUTPATIENT
Start: 2022-06-03 | End: 2022-06-04 | Stop reason: HOSPADM

## 2022-06-03 RX ADMIN — BRIVARACETAM 50 MG: 50 TABLET, FILM COATED ORAL at 08:56

## 2022-06-03 RX ADMIN — SODIUM CHLORIDE, PRESERVATIVE FREE 10 ML: 5 INJECTION INTRAVENOUS at 22:04

## 2022-06-03 RX ADMIN — SODIUM CHLORIDE 150 MG: 9 INJECTION, SOLUTION INTRAVENOUS at 08:51

## 2022-06-03 RX ADMIN — SODIUM CHLORIDE, PRESERVATIVE FREE 10 ML: 5 INJECTION INTRAVENOUS at 08:51

## 2022-06-03 RX ADMIN — SODIUM CHLORIDE, PRESERVATIVE FREE 10 ML: 5 INJECTION INTRAVENOUS at 15:02

## 2022-06-03 RX ADMIN — SODIUM CHLORIDE, PRESERVATIVE FREE 10 ML: 5 INJECTION INTRAVENOUS at 15:00

## 2022-06-03 RX ADMIN — SODIUM CHLORIDE, PRESERVATIVE FREE 10 ML: 5 INJECTION INTRAVENOUS at 15:05

## 2022-06-03 RX ADMIN — BRIVARACETAM 50 MG: 50 TABLET, FILM COATED ORAL at 17:51

## 2022-06-03 RX ADMIN — SODIUM CHLORIDE, PRESERVATIVE FREE 10 ML: 5 INJECTION INTRAVENOUS at 14:20

## 2022-06-03 RX ADMIN — ACETAMINOPHEN 650 MG: 325 TABLET ORAL at 16:14

## 2022-06-03 RX ADMIN — LACOSAMIDE 150 MG: 50 TABLET ORAL at 22:00

## 2022-06-03 RX ADMIN — ASPIRIN 81 MG: 81 TABLET, CHEWABLE ORAL at 12:17

## 2022-06-03 NOTE — PROGRESS NOTES
Spoke to pharmacy about medication pxyies machine not allowing this nurse to return Briviact 50 mg tab and the additional 100mg tab pulled mistakenly. Pharmacy advised this nurse to do an inventory count to return the medication. Inventory count and medication return by this nurse and Caro Marion. Count is currently at 9 tabs.

## 2022-06-03 NOTE — PROGRESS NOTES
Orders noted, chart reviewed. Note patient  passed STAND, no speech deficits per staff, tolerating diet per MD note. Will complete orders. Please reconsult us if difficulty arises with PO.          Citlali Montgomery M.S., [address] [facility], [place], operated [facility]  Speech-Language Pathologist

## 2022-06-03 NOTE — PROGRESS NOTES
Occupational Therapy Note:  Orders received and appreciated. Spoke with PT and pt is at and/or near her baseline function, has 24/7 assist from family and all equipment needed for safety. PT eval performed earlier today. Will see for OT eval on Monday if pt still hospitalized. Thank you for the consult.   Arpan Lepe, OTR/L, CBIS

## 2022-06-03 NOTE — CONSULTS
NEUROLOGY CONSULTATION    DATE OF CONSULTATION:6/2/2022  CONSULTED BY:Dr DANNY Smith    REASON FOR CONSULT: Seizure      HISTORY OF PRESENT ILLNESS  Lai Fountain is a 76 y.o. black female with history of anemia, degenerative joint disease, asthma, headache, seizure disorder who presented to the emergency room with seizure activity. Most of the history was obtained from the chart, according to the ER information, patient was said to have had a seizure while at home, ambulance was called, while in the ambulance patient was said to have another seizure. It is uncertain how long the seizure was on her menses patient has had previously. She said to be compliant with her medications. While in the emergency room, patient was said to be drowsy however agitated. Not much history obtained from patient. Initially initial CT scan of the head was unremarkable for acute lesion or bleed. Patient was subsequently admitted for further evaluation and management due to prolonged postictal and neurology consulted  At the time of examination, patient was awake but drowsy, following simple command but unable to give history. Review of Systems - History obtained from chart review      [facility]  Past Medical History:   Diagnosis Date    Anemia     Arthritis     Asthma     Headache     Seizures ([address]        [address] [facility]  Social History     Socioeconomic History    Marital status:    Tobacco Use    Smoking status: Heavy Tobacco Smoker     Packs/day: 0.00     Types: Cigarettes    Smokeless tobacco: Never Used   Substance and Sexual Activity    Alcohol use: No    Drug use: No    Sexual activity: Not Currently       FH  History reviewed. No pertinent family history.     ALLERGIES  No Known Allergies    CURRENT MEDS  Current Facility-Administered Medications   Medication Dose Route Frequency Provider Last Rate Last Admin    sodium chloride (NS) flush 5-40 mL  5-40 mL IntraVENous Q8H Sherice Muñoz MD   10 mL at 06/02/22 2200    sodium chloride (NS) flush 5-40 mL  5-40 mL IntraVENous PRN Evan Edward MD        acetaminophen (TYLENOL) tablet 650 mg  650 mg Oral Q6H PRN Evan Edward MD        Or   [facility] acetaminophen (TYLENOL) suppository 650 mg  650 mg Rectal Q6H PRN Evan Edward MD        polyethylene glycol (MIRALAX) packet 17 g  17 g Oral DAILY PRN Evan Edward MD        ondansetron (ZOFRAN ODT) tablet 4 mg  4 mg Oral Q8H PRN Evan Edward MD        Or    ondansetron [facility]) injection 4 mg  4 mg IntraVENous Q6H PRN Evan Edward MD        sodium chloride (NS) flush 5-10 mL  5-10 mL IntraVENous PRN Evan Edward MD        0.9% sodium chloride infusion  50 mL/hr IntraVENous CONTINUOUS Evan Edward MD 50 mL/hr at 06/02/22 1826 50 mL/hr at 06/02/22 1826    lacosamide (VIMPAT) 150 mg in 0.9% sodium chloride 100 mL IVPB  150 mg IntraVENous Q12H Evan Edward  mL/hr at 06/02/22 1822 150 mg at 06/02/22 1822    [Held by provider] enoxaparin (LOVENOX) injection 60 mg  1 mg/kg SubCUTAneous Q12H Evan Edward MD        brivaracetam (BRIVIACT) tablet 50 mg  50 mg Oral BID Luis Felipe Peguero MD        LORazepam (ATIVAN) injection 1 mg  1 mg IntraVENous Q4H PRN Evan Edward MD           ROS  As per HPI      CLINICAL DATA REVIEW  IMAGING: (I personally reviewed these images in PACS )    LABS  Recent Results (from the past 48 hour(s))   GLUCOSE, POC    Collection Time: 06/02/22  9:13 AM   Result Value Ref Range    Glucose (POC) 119 (H) 65 - 117 mg/dL    Performed by Delmer Norton    CBC WITH AUTOMATED DIFF    Collection Time: 06/02/22  9:20 AM   Result Value Ref Range    WBC 8.8 3.6 - 11.0 K/uL    RBC 4.09 3.80 - 5.20 M/uL    HGB 9.6 (L) 11.5 - 16.0 g/dL    HCT 32.4 (L) 35.0 - 47.0 %    MCV 79.2 (L) 80.0 - 99.0 FL    MCH 23.5 (L) 26.0 - 34.0 PG    MCHC 29.6 (L) 30.0 - 36.5 g/dL    RDW 16.4 (H) 11.5 - 14.5 %    PLATELET 483 750 - 826 K/uL    MPV 8.8 (L) 8.9 - 12.9 FL    NRBC 0.0 0  WBC    ABSOLUTE NRBC 0.00 0.00 - 0.01 K/uL    NEUTROPHILS 81 (H) 32 - 75 %    LYMPHOCYTES 13 12 - 49 %    MONOCYTES 5 5 - 13 %    EOSINOPHILS 1 0 - 7 %    BASOPHILS 0 0 - 1 %    IMMATURE GRANULOCYTES 0 0.0 - 0.5 %    ABS. NEUTROPHILS 7.1 1.8 - 8.0 K/UL    ABS. LYMPHOCYTES 1.2 0.8 - 3.5 K/UL    ABS. MONOCYTES 0.5 0.0 - 1.0 K/UL    ABS. EOSINOPHILS 0.0 0.0 - 0.4 K/UL    ABS. BASOPHILS 0.0 0.0 - 0.1 K/UL    ABS. IMM. GRANS. 0.0 0.00 - 0.04 K/UL    DF AUTOMATED     METABOLIC PANEL, COMPREHENSIVE    Collection Time: 06/02/22  9:20 AM   Result Value Ref Range    Sodium 136 136 - 145 mmol/L    Potassium 3.9 3.5 - 5.1 mmol/L    Chloride 101 97 - 108 mmol/L    CO2 31 21 - 32 mmol/L    Anion gap 4 (L) 5 - 15 mmol/L    Glucose 117 (H) 65 - 100 mg/dL    BUN 7 6 - 20 MG/DL    Creatinine 0.82 0.55 - 1.02 MG/DL    BUN/Creatinine ratio 9 (L) 12 - 20      GFR est AA >60 >60 ml/min/1.73m2    GFR est non-AA >60 >60 ml/min/1.73m2    Calcium 8.8 8.5 - 10.1 MG/DL    Bilirubin, total 0.4 0.2 - 1.0 MG/DL    ALT (SGPT) 11 (L) 12 - 78 U/L    AST (SGOT) 16 15 - 37 U/L    Alk.  phosphatase 87 45 - 117 U/L    Protein, total 7.2 6.4 - 8.2 g/dL    Albumin 3.2 (L) 3.5 - 5.0 g/dL    Globulin 4.0 2.0 - 4.0 g/dL    A-G Ratio 0.8 (L) 1.1 - 2.2     TROPONIN-HIGH SENSITIVITY    Collection Time: 06/02/22  9:20 AM   Result Value Ref Range    Troponin-High Sensitivity 7 0 - 51 ng/L   URINALYSIS W/ RFLX MICROSCOPIC    Collection Time: 06/02/22 10:39 AM   Result Value Ref Range    Color YELLOW/STRAW      Appearance CLEAR CLEAR      Specific gravity 1.010      pH (UA) 8.5 (H) 5.0 - 8.0      Protein Negative NEG mg/dL    Glucose Negative NEG mg/dL    Ketone Negative NEG mg/dL    Bilirubin Negative NEG      Blood Negative NEG      Urobilinogen 1.0 0.2 - 1.0 EU/dL    Nitrites Negative NEG      Leukocyte Esterase Negative NEG     COVID-19 WITH INFLUENZA A/B    Collection Time: 06/02/22  4:18 PM   Result Value Ref Range    SARS-CoV-2 by PCR Not detected NOTD      Influenza A by PCR Not detected      Influenza B by PCR Not detected     LACTIC ACID    Collection Time: 06/02/22  5:01 PM   Result Value Ref Range    Lactic acid 1.0 0.4 - 2.0 MMOL/L   METABOLIC PANEL, COMPREHENSIVE    Collection Time: 06/02/22  5:01 PM   Result Value Ref Range    Sodium 139 136 - 145 mmol/L    Potassium 3.5 3.5 - 5.1 mmol/L    Chloride 103 97 - 108 mmol/L    CO2 29 21 - 32 mmol/L    Anion gap 7 5 - 15 mmol/L    Glucose 107 (H) 65 - 100 mg/dL    BUN 6 6 - 20 MG/DL    Creatinine 0.80 0.55 - 1.02 MG/DL    BUN/Creatinine ratio 8 (L) 12 - 20      GFR est AA >60 >60 ml/min/1.73m2    GFR est non-AA >60 >60 ml/min/1.73m2    Calcium 8.6 8.5 - 10.1 MG/DL    Bilirubin, total 0.5 0.2 - 1.0 MG/DL    ALT (SGPT) 12 12 - 78 U/L    AST (SGOT) 14 (L) 15 - 37 U/L    Alk. phosphatase 81 45 - 117 U/L    Protein, total 6.7 6.4 - 8.2 g/dL    Albumin 3.0 (L) 3.5 - 5.0 g/dL    Globulin 3.7 2.0 - 4.0 g/dL    A-G Ratio 0.8 (L) 1.1 - 2.2     EKG, 12 LEAD, INITIAL    Collection Time: 06/02/22  6:15 PM   Result Value Ref Range    Ventricular Rate 88 BPM    Atrial Rate 88 BPM    P-R Interval 152 ms    QRS Duration 80 ms    Q-T Interval 384 ms    QTC Calculation (Bezet) 464 ms    Calculated P Axis 78 degrees    Calculated R Axis -35 degrees    Calculated T Axis 8 degrees    Diagnosis       Normal sinus rhythm with sinus arrhythmia  Left axis deviation  Nonspecific T wave abnormality  Abnormal ECG  When compared with ECG of 02-JUN-2022 09:14,  QT has lengthened          PHYSICAL EXAM  Visit Vitals  /64 (BP 1 Location: Left upper arm, BP Patient Position: At rest)   Pulse 85   Temp 98.8 °F (37.1 °C)   Resp 16   Ht 5' 4\" (1.626 m)   Wt 122 lb 3.2 oz (55.4 kg)   SpO2 98%   BMI 20.98 kg/m²     General:  Alert, cooperative, no distress. Head:  Normocephalic, without obvious abnormality, atraumatic. Eyes:  Conjunctivae/corneas clear. Pupils equal, round, sluggishly reactive to light.  Extraocular movements intact, , NO papilledema Lungs: Heart:   Non labored breathing  Regular rate and rhythm, no carotid bruits   Abdomen:   Soft, non-distended   Extremities: Extremities normal, atraumatic, no cyanosis or edema. Pulses: 2+ and symmetric all extremities. Skin: Skin color, texture, turgor normal. No rashes or lesions. Neurologic:  Gen: Attention normal             Language: naming, repetition, fluency normal             Memory: Unable to assess  Cranial Nerves:  I: smell Not tested   II: visual fields Full to confrontation   II: pupils Equal, round, reactive to light   II: optic disc No papilledema   III,VII: ptosis none   III,IV,VI: extraocular muscles  Full ROM   V: mastication normal   V: facial light touch sensation  normal   VII: facial muscle function   symmetric   VIII: hearing symmetric   IX: soft palate elevation  normal   XI: trapezius strength  5/5   XI: sternocleidomastoid strength 5/5   XI: neck flexion strength  5/5   XII: tongue  midline     Motor: normal bulk and tone, no tremor              Strength: Moves all four extremities  Sensory: Responded to painful stimulus   Coordination: Unable to assess  Gait: Not assessed  Reflexes: 1+ throughout  Plantar: Withdrawal       IMPRESSION: This is a 75-year-old black female who is being evaluated for breakthrough seizure with prolonged postictal  Recurrent generalized tonic-clonic seizure  Prolonged postictal  Status epilepticus  Rule out cerebrovascular accidents    RECOMMENDATIONS:  Vimpat  mg every 12 hourly until patient is able to swallow then change to p.o.   Due to patient's age and history of agitation, I will discontinue Keppra  I will start patient on Briviact 50 mg p.o. twice daily when patient stable to swallow  MRI of the brain with and without gadolinium  EEG  Blood for vitamin B12, vitamin D, vitamin B6, prolactin, CK, aldolase, CRP, lipid profile, TSH, RPR  Seizure precaution  PT/OT evaluation  Ativan 1 mg IV for every seizure not to exceed 12 mg in 24 hours  Aspirin 81 mg daily    Thank you very much for this consultation.      Naz Tyler MD

## 2022-06-03 NOTE — PROGRESS NOTES
Problem: Falls - Risk of  Goal: *Absence of Falls  Description: Document [place] Jackson Fall Risk and appropriate interventions in the flowsheet. Outcome: Progressing Towards Goal  Note: Fall Risk Interventions:  Mobility Interventions: Patient to call before getting OOB,Bed/chair exit alarm,Communicate number of staff needed for ambulation/transfer    Mentation Interventions: Adequate sleep, hydration, pain control,Bed/chair exit alarm,Door open when patient unattended,Evaluate medications/consider consulting pharmacy,Toileting rounds,Update white board,More frequent rounding    Medication Interventions: Bed/chair exit alarm,Evaluate medications/consider consulting pharmacy,Patient to call before getting OOB,Teach patient to arise slowly    Elimination Interventions: Bed/chair exit alarm,Call light in reach,Patient to call for help with toileting needs,Stay With Me (per policy),Toileting schedule/hourly rounds    History of Falls Interventions: Bed/chair exit alarm,Door open when patient unattended,Evaluate medications/consider consulting pharmacy         Problem: General Infection Care Plan (Adult and Pediatric)  Goal: Improvement in signs and symptoms of infection  Outcome: Progressing Towards Goal  Goal: *Optimize nutritional status  Outcome: Progressing Towards Goal     Problem: Pressure Injury - Risk of  Goal: *Prevention of pressure injury  Description: Document Derek Scale and appropriate interventions in the flowsheet. Outcome: Progressing Towards Goal  Note: Pressure Injury Interventions:  Sensory Interventions: Assess changes in LOC    Moisture Interventions: Absorbent underpads,Check for incontinence Q2 hours and as needed,Maintain skin hydration (lotion/cream),Minimize layers    Activity Interventions: Increase time out of bed,Pressure redistribution bed/mattress(bed type)    Mobility Interventions: HOB 30 degrees or less,Pressure redistribution bed/mattress (bed type),Turn and reposition approx.  every two hours(pillow and wedges)    Nutrition Interventions: Document food/fluid/supplement intake,Offer support with meals,snacks and hydration    Friction and Shear Interventions: Minimize layers,Apply protective barrier, creams and emollients

## 2022-06-03 NOTE — PROGRESS NOTES
MORGAN    RUR: obs    Plan   Arrange PCP f/u appt  MOON completed  Transportation-family, daughters  Monitor to see if pt needs [facility]    Care Management Interventions  PCP Verified by CM: Yes (Oct 2021)  Mode of Transport at Discharge: Other (see comment) (daughters, Nain Sam or Gail)  Transition of Care Consult (CM Consult): Discharge Planning  MyChart Signup:  (utilizes cane, walker, shower chair)  Support Systems: Child(yordan)  Confirm Follow Up Transport: Family (Nain chavez)  The Plan for Transition of Care is Related to the Following Treatment Goals : pcp possible neurology, possible HH  The Patient and/or Patient Representative was Provided with a Choice of Provider and Agrees with the Discharge Plan?: Yes  Name of the Patient Representative Who was Provided with a Choice of Provider and Agrees with the Discharge Plan: pt, pt daughter, physician, nursing and rounds  Freedom of Choice List was Provided with Basic Dialogue that Supports the Patient's Individualized Plan of Care/Goals, Treatment Preferences and Shares the Quality Data Associated with the Providers?: Yes  Discharge Location  Patient Expects to be Discharged to[de-identified] Home    CM met w/ pt beside for initial assessment. Pt confirmed demographic information. Pt lives w/ daughter's Nain Sam and Eric Meza in 1st floor apt. Client reported that she utilizes a walker, cane & shower chair. Client provided pharmacy, [facility] on [address]/[place]. Pt reported that daughter Cathalene Dandy provides transportation for her and daughter Max Watt as needed. Pt could not remember Apt # or son's phone number, asked CM to contact daughter Marcelo Dove and also provide contact for son Lory Duarte for secondary contact. Pt has a supportive system, children assist. CM contacted daughter Marcelo Dove and she provided updated address [address] Pt's son Charisma Wright, number was provided [phone]. Daughter Eric Meza can be reached at home number. Pt did not have any questions or concerns. CM provided pt w/ VELIZ to sign and added to pt chart.        [place], [id number] [facility]

## 2022-06-03 NOTE — PROGRESS NOTES
1515) Bedside shift change report given to Jarrett Apley, RN (oncoming nurse) by Maricruz Ling (offgoing nurse). Report included the following information SBAR, Kardex and MAR.   4191) Rashad Alarcon headache 8/10. Tylenol written only for fever, would you like to change it for headache. Or could she have Fioricet? Also could I clarify that she's on stroke protocol--should we have a NIH done? 65) Rashad Viveros for MRI until Monday (tech leaves at 4 today). Waiting on other daughter to confirm if aneurism surgery had metal clips. 65) Pt daughter Fanny Todd call to check on mother (pt permission to update pt.) Daughter would like other siblings to remain on contact list; not willing to leave phone number at this time. 0) Rashad Teran Errol CSF lab  [phone]) Call to [facility] lab--should be able to add-on lab.   1920) Bedside shift change report given to Kalyan Valenzuela RN (oncoming nurse) by Jarrett Apley, RN (offgoing nurse).  Report included the following information SBAR, Kardex, MAR, Recent Results and Cardiac Rhythm SA.

## 2022-06-03 NOTE — PROGRESS NOTES
PROCEDURE: ROUTINE INPATIENT EEG  NAME:   Mauro Lopez  EEG NUMBER: [id number]  ACCOUNT NUMBER : [de-identified]  MRN:   [id number]  DATE OF SERVICE: 6/2/2022    HISTORY/INDICATION: Seizure disorder  MEDICATIONS: See chart  CONDITIONS OF RECORDING: This is a routine 21-channel EEG recording performed in accordance with the international 10-20 system with one channel devoted to limited EKG. This study was done during states of wakefulness. Photic stimulation, no hyperventilation was performed as activating procedures. In addition, eye movement was recorded    DESCRIPTION:   Upon maximal arousal the posterior dominant rhythm has a frequency of 7-11 hz with an amplitude of 15 uV. This activity is symmetric over the bilateral posterior derivations and attenuates with eye opening. Photic stimulation did not significantly alter the tracing. The patient becomes drowsy, but deeper stages of sleep are not attained . Normal sleep architecture is seen with stage II sleep recognized by the presence of symmetric vertex waves and sleep spindles. There are no focal abnormalities, epileptiform discharges, or electrographic seizures seen. There was left occasional frontotemporal slowing in the range of 3 to 4 cps theta activity, however, dominating the tracing was 12-13 Beta activity. No overt epileptic activity noted  INTERPRETATION:   The EEG revealed occasional left frontotemporal slowing, however, dominating  the tracing was beta activity, this beta activity may be secondary to medication, no overt epileptic activity noted. Occasional left frontotemporal slowing may be due to cerebral disturbance versus epileptogenic focus with postictal state. CLINICAL CORRELATION: A normal EEG does not definitively exclude a diagnosis of epilepsy if clinical suspicion is high consider sleep deprived EEG.      Sony Nuñez MD

## 2022-06-03 NOTE — PROGRESS NOTES
0820: Patient alert now, oriented x 3, but still disoriented to situation. This nurse tried to go over MRI screening questions with the daughter over the phone, but the oldest daughter didn't really know patient's medical/surgical history and the other daughter was not available at this time. 4553: This nurse tried to draw blood for ordered lab work but was unsuccessful. Spoke with the charge nurse, Fredrick Haines, he said he will try. 0940: Ordered blood work collected and hand delivered to the lab. 1210: Dr. Akin Trammell spoke with patient's daughter, Sharif Caban, about the LP. Consent received through telephone with Dr. Akin Trammell. 1215: Labs for spinal fluid was hand delivered to the lab.    1509: Bladder scan done, scanned for 684.     1520: Patient used BSC, voided 650ml of clear yellow urine. 1525: Bedside and Verbal shift change report given to [facility] (oncoming nurse) by Patrina Cogan (offgoing nurse). Report included the following information SBAR.

## 2022-06-03 NOTE — PROGRESS NOTES
Problem: Mobility Impaired (Adult and Pediatric)  Goal: *Acute Goals and Plan of Care (Insert Text)  Description: FUNCTIONAL STATUS PRIOR TO ADMISSION: Patient was using a walker for functional mobility with supervision from her daughters. HOME SUPPORT PRIOR TO ADMISSION: The patient lived with her daughters who provide assist as needed. Physical Therapy Goals  Initiated 6/3/2022  1. Patient will move from supine to sit and sit to supine  in bed with modified independence within 7 day(s). 2.  Patient will transfer from bed to chair and chair to bed with modified independence using the least restrictive device within 7 day(s). 3.  Patient will perform sit to stand with modified independence within 7 day(s). 4.  Patient will ambulate with modified independence for 200 feet with the least restrictive device within 7 day(s). 5.  Patient will improve MEDRANO balance outcome measure by 7 points within 7 days. Outcome: Not Met     PHYSICAL THERAPY EVALUATION  Patient: Mainor Abad (73 y.o. female)  Date: 6/3/2022  Primary Diagnosis: Seizure ([facility] [address] [R56.9]        Precautions:        ASSESSMENT  Based on the objective data described below, the patient presents with decreased activity tolerance, balance, and mobility. Patient appears to be close to her baseline mobility. No speech deficits noted. Patient performed bed mobility and stood to walker with SBA. Patient ambulated 50 feet with walker and CGA. Patient with slow pace but no loss of balance noted. Recommend home health PT and continued assist from her daughters. Current Level of Function Impacting Discharge (mobility/balance): CGA x1 with walker     Patient will benefit from skilled therapy intervention to address the above noted impairments.        PLAN :  Recommendations and Planned Interventions: bed mobility training, transfer training, gait training, therapeutic exercises, neuromuscular re-education, patient and family training/education, and therapeutic activities      Frequency/Duration: Patient will be followed by physical therapy:  3 times a week to address goals. Recommendation for discharge: (in order for the patient to meet his/her long term goals)  Physical therapy at least 2 days/week in the home AND ensure assist and/or supervision for safety with mobility    This discharge recommendation:  Has been made in collaboration with the attending provider and/or case management    IF patient discharges home will need the following DME: patient owns DME required for discharge         SUBJECTIVE:   Patient stated I feel okay.     OBJECTIVE DATA SUMMARY:   HISTORY:    Past Medical History:   Diagnosis Date    Anemia     Arthritis     Asthma     Headache     Seizures ([address]      Past Surgical History:   Procedure Laterality Date    NEUROLOGICAL PROCEDURE UNLISTED         Home Situation  Home Environment: Apartment (wheelchair accessable)  One/Two Story Residence: One story  Living Alone: No  Support Systems: Child(yordan)  Patient Expects to be Discharged to[de-identified] Home  Current DME Used/Available at Home: Grab bars,Shower chair,Walker    EXAMINATION/PRESENTATION/DECISION MAKING:   Critical Behavior:  Neurologic State: Drowsy  Orientation Level: Oriented to person,Oriented to place,Oriented to time,Disoriented to situation  Cognition: Follows commands     Hearing: Auditory  Auditory Impairment: None    Range Of Motion:  AROM: Generally decreased, functional    PROM: Generally decreased, functional    Strength:    Strength: Generally decreased, functional    Tone & Sensation:   Tone: Normal    Functional Mobility:  Bed Mobility:     Supine to Sit: Stand-by assistance  Sit to Supine: Stand-by assistance  Scooting: Stand-by assistance    Transfers:  Sit to Stand: Stand-by assistance  Stand to Sit: Stand-by assistance        Balance:   Sitting: Intact  Standing: Impaired; With support  Standing - Static: Constant support;Good  Standing - Dynamic : Constant support; Fair    Ambulation/Gait Training:  Distance (ft): 50 Feet (ft)  Assistive Device: Gait belt;Walker, rolling  Ambulation - Level of Assistance: Contact guard assistance  Gait Description (WDL): Exceptions to WDL  Gait Abnormalities: Decreased step clearance  Base of Support: Narrowed  Speed/Cee: Slow;Shuffled  Step Length: Right shortened;Left shortened    Bingham Balance Test:    Sitting to Standing: 3  Standing Unsupported: 3  Sitting with Back Unsupported: 4  Standing to Sittin  Transfers: 3  Standing Unsupported with Eyes Closed: 3  Standing Unsupported with Feet Together: 3  Reach Forward with Outstretched Arm: 3   Object: 2  Turn to Look Over Shoulders: 2  Turn 360 Degrees: 2  Alternate Foot on Step/Stool: 2  Standing Unsupported One Foot in Front: 1  Stand on One Le  Total: 36/56         56=Maximum possible score;   0-20=High fall risk  21-40=Moderate fall risk   41-56=Low fall risk       Based on the above components, the patient evaluation is determined to be of the following complexity level: LOW     Pain Rating:  No pain    Activity Tolerance:   Good    After treatment patient left in no apparent distress:   Supine in bed, Call bell within reach, Bed / chair alarm activated, and Side rails x 3    COMMUNICATION/EDUCATION:   The patients plan of care was discussed with: Registered nurse. Patient and/or family was verbally educated on the BE FAST acronym for signs/symptoms of CVA and TIA. All questions answered with patient indicating good understanding. Fall prevention education was provided and the patient/caregiver indicated understanding., Patient/family have participated as able in goal setting and plan of care. , and Patient/family agree to work toward stated goals and plan of care.     Thank you for this referral.  Suma Martines, PT   Time Calculation: 18 mins

## 2022-06-03 NOTE — PROGRESS NOTES
Hospitalist Progress Note    NAME: Radha Bansal   :  1945   MRN:  [id number]   Room Number:    @ [facility]     Please note that this dictation was completed with Jessica Macedo, the computer voice recognition software. Quite often unanticipated grammatical, syntax, homophones, and other interpretive errors are inadvertently transcribed by the computer software. Please disregard these errors. Please excuse any errors that have escaped final proofreading. Interim Hospital Summary: 76 y.o. female whom presented on 2022 with      Assessment / Plan:  Anticipated discharge date : 2022  Anticipated disposition : Home  Barriers to discharge : Workup pending      Seizure POA  Altered mental status POA due to   Post-ictal state POA  Rule out stroke   Likely due to Ativan administration and post ictal state. CT head interpreted independently- no evidence of temporal enhancement. Patient compliant with medications     - Neurology following  - Keppra changed to brivateracetam  - Continue lacosamide   - Ativan PRN for seizure  - Seizure precautions  - LP performed by anesthesiologist, awaiting CSF studies  - EEG pending  - MRI Brain, MRA Brain and neck, ECHO to complete work up  - PT/OT/SLP        Fever POA  UA negative for infection, CXR showed no acute process. CT head interpreted independently- no evidence of temporal enhancement. Lactic acid wnl. COVID, Influenza panel negative. - Hold off abx as fever resolved spontaneously  - Await CSF studies  - Follow up blood Cx     DVT POA  On eliquis at home,last dose 2022  - Resume Eliquis tomorrow. - SCD        Microcytic hypochromic anemia POA  Likely At baseline  - resume iron supplementation when able to tolerate oral intake         Body mass index is 20.98 kg/m².   Code Status: full   Surrogate Decision Maker:  Radha Bansal cell [phone], another cell [phone],  home [phone]     DVT Prophylaxis: Lovenox  GI Prophylaxis: not indicated  Baseline: ambulatory with assistive device          Subjective:     Chief Complaint / Reason for Physician Visit  \"doing fine\" oriented to place,time and person not to situation Discussed with RN events overnight. Review of Systems:  No fevers, chills, appetite change, cough, sputum production, shortness of breath, dyspnea on exertion, nausea, vomitting, diarrhea, constipation, chest pain, leg edema, abdominal pain, joint pain, rash, itching. Tolerating PT/OT. Tolerating diet. Objective:     VITALS:   Last 24hrs VS reviewed since prior progress note. Most recent are:  Patient Vitals for the past 24 hrs:   Temp Pulse Resp BP SpO2   06/03/22 1109 97.8 °F (36.6 °C) 65 16 118/69 100 %   06/03/22 0741 98.6 °F (37 °C) 76 18 (!) 143/85 96 %   06/02/22 2056 98.8 °F (37.1 °C) 85 16 130/64 98 %   06/02/22 1601 (!) 101.7 °F (38.7 °C) -- -- -- --   06/02/22 1522 (!) 102.2 °F (39 °C) 93 16 109/77 98 %   06/02/22 1445 -- 95 21 112/77 95 %       Intake/Output Summary (Last 24 hours) at 6/3/2022 1332  Last data filed at 6/3/2022 1112  Gross per 24 hour   Intake 838.33 ml   Output --   Net 838.33 ml        PHYSICAL EXAM:  General: Alert, cooperative, no acute distress    EENT:  EOMI. Anicteric sclerae. MMM  Resp:  CTA bilaterally, no wheezing or rales. No accessory muscle use  CV:  Regular  rhythm,  normal S1/S2, no murmurs rubs gallops, No edema  GI:  Soft, Non distended, Non tender. +Bowel sounds  Neurologic:  Alert and oriented X 3, normal speech,   Psych:   Fair insight. Not anxious nor agitated  Skin:  No rashes.   No jaundice    Reviewed most current lab test results and cultures  YES  Reviewed most current radiology test results   YES  Review and summation of old records today    NO  Reviewed patient's current orders and MAR    YES  PMH/SH reviewed - no change compared to H&P  ________________________________________________________________________  Care Plan discussed with: Comments   Patient x    Family  x    RN x    Care Manager x    Consultant  x                     x Multidiciplinary team rounds were held today with , nursing, pharmacist and clinical coordinator. Patient's plan of care was discussed; medications were reviewed and discharge planning was addressed. ________________________________________________________________________  Total NON critical care TIME:  35  Minutes    Total CRITICAL CARE TIME Spent:   Minutes non procedure based      Comments   >50% of visit spent in counseling and coordination of care x    ________________________________________________________________________  Cuong Flores MD     Procedures: see electronic medical records for all procedures/Xrays and details which were not copied into this note but were reviewed prior to creation of Plan. LABS:  I reviewed today's most current labs and imaging studies.   Pertinent labs include:  Recent Labs     06/03/22  0903 06/02/22  0920   WBC 7.3 8.8   HGB 9.1* 9.6*   HCT 30.1* 32.4*    375     Recent Labs     06/03/22  0510 06/02/22  1701 06/02/22  0920    139 136   K 3.5 3.5 3.9    103 101   CO2 28 29 31   GLU 96 107* 117*   BUN 6 6 7   CREA 0.73 0.80 0.82   CA 8.5 8.6 8.8   MG 1.8  --   --    PHOS 2.7  --   --    ALB 2.9* 3.0* 3.2*   TBILI 0.7 0.5 0.4   ALT 9* 12 11*       Signed: Cuong Flores MD

## 2022-06-03 NOTE — PROGRESS NOTES
Diagnostic Lumbar Pucture    Right lateral position Sterile technique  #22 Spinal needle at L 3-4  Crystal clear CSF  CSF Pressure 16  Total 4 vials approx 2 ml each CSF in numbered vials

## 2022-06-03 NOTE — PROGRESS NOTES
During final round for shift. This RN noticed patient's abdomen was distended. Patient c/o of not being able to urinate. Bladder scan obtained and resulted with 980 ml of urine in bladder. Hospitalist notified per supervisor iJgna Monterroso). She stated no new orders. Patient was straight cath and received 55427 ml from bladder. Stopped patient's maintenance fluild . NS infusing at 50ml/hr.

## 2022-06-04 ENCOUNTER — APPOINTMENT (OUTPATIENT)
Dept: CT IMAGING | Age: 77
DRG: 101 | End: 2022-06-04
Attending: STUDENT IN AN ORGANIZED HEALTH CARE EDUCATION/TRAINING PROGRAM
Payer: MEDICARE

## 2022-06-04 VITALS
SYSTOLIC BLOOD PRESSURE: 120 MMHG | HEART RATE: 76 BPM | RESPIRATION RATE: 20 BRPM | OXYGEN SATURATION: 99 % | HEIGHT: 64 IN | TEMPERATURE: 98.4 F | DIASTOLIC BLOOD PRESSURE: 72 MMHG | WEIGHT: 118.5 LBS | BODY MASS INDEX: 20.23 KG/M2

## 2022-06-04 LAB
CRYPTOCOCCUS NEOFORMANS/GATTII, CRNEOG: NOT DETECTED
CYTOMEGALOVIRUS, CYMEG: NOT DETECTED
ENTEROVIRUS, ENTVIR: NOT DETECTED
ESCHERICHIA COLI K1, ECK1: NOT DETECTED
GLUCOSE BLD STRIP.AUTO-MCNC: 105 MG/DL (ref 65–117)
HAEMOPHILUS INFLUENZAE, HAEFLU: NOT DETECTED
HERPES SIMPLEX VIRUS 2, HSIMV2: NOT DETECTED
HSV1 DNA CSF QL NAA+PROBE: NOT DETECTED
HUMAN HERPESVIRUS 6, HUHV6: NOT DETECTED
HUMAN PARECHOVIRUS, HUPARV: NOT DETECTED
LISTERIA MONOCYTOGENES, LISMON: NOT DETECTED
NEISSERIA MENINGITIDIS, NEIMEN: NOT DETECTED
SERVICE CMNT-IMP: NORMAL
STREPTOCOCCUS AGALACTIAE, SAGA: NOT DETECTED
STREPTOCOCCUS PNEUMONIAE, STRPNE: NOT DETECTED
VARICELLA ZOSTER VIRUS, VAZOVI: NOT DETECTED

## 2022-06-04 PROCEDURE — 74011000250 HC RX REV CODE- 250: Performed by: STUDENT IN AN ORGANIZED HEALTH CARE EDUCATION/TRAINING PROGRAM

## 2022-06-04 PROCEDURE — 74011250637 HC RX REV CODE- 250/637: Performed by: PSYCHIATRY & NEUROLOGY

## 2022-06-04 PROCEDURE — 74011250637 HC RX REV CODE- 250/637: Performed by: STUDENT IN AN ORGANIZED HEALTH CARE EDUCATION/TRAINING PROGRAM

## 2022-06-04 PROCEDURE — 51798 US URINE CAPACITY MEASURE: CPT

## 2022-06-04 PROCEDURE — 70450 CT HEAD/BRAIN W/O DYE: CPT

## 2022-06-04 PROCEDURE — 82962 GLUCOSE BLOOD TEST: CPT

## 2022-06-04 PROCEDURE — G0378 HOSPITAL OBSERVATION PER HR: HCPCS

## 2022-06-04 RX ORDER — TAMSULOSIN HYDROCHLORIDE 0.4 MG/1
0.4 CAPSULE ORAL DAILY
Qty: 30 CAPSULE | Refills: 0 | Status: SHIPPED | OUTPATIENT
Start: 2022-06-04 | End: 2022-06-29 | Stop reason: SDUPTHER

## 2022-06-04 RX ORDER — TAMSULOSIN HYDROCHLORIDE 0.4 MG/1
0.4 CAPSULE ORAL DAILY
Status: DISCONTINUED | OUTPATIENT
Start: 2022-06-04 | End: 2022-06-04 | Stop reason: HOSPADM

## 2022-06-04 RX ADMIN — APIXABAN 5 MG: 5 TABLET, FILM COATED ORAL at 13:46

## 2022-06-04 RX ADMIN — LACOSAMIDE 150 MG: 50 TABLET ORAL at 09:28

## 2022-06-04 RX ADMIN — TAMSULOSIN HYDROCHLORIDE 0.4 MG: 0.4 CAPSULE ORAL at 13:47

## 2022-06-04 RX ADMIN — SODIUM CHLORIDE, PRESERVATIVE FREE 10 ML: 5 INJECTION INTRAVENOUS at 05:54

## 2022-06-04 RX ADMIN — ASPIRIN 81 MG: 81 TABLET, CHEWABLE ORAL at 09:27

## 2022-06-04 RX ADMIN — BRIVARACETAM 50 MG: 50 TABLET, FILM COATED ORAL at 09:28

## 2022-06-04 NOTE — PROGRESS NOTES
MORGAN     RUR 11%     FARZANEH home with Home later today. Talked to MD and Nursing. Obtained orders for Home Health - Face to Face    Met with patient and Daughter. Discussed Home Health and choice of agency. Patient had Home Health Before but could not remember the agency. [facility] or who can accept. [address] [facility] saw at [address] [place] [address] [address] to call on Monday to arrange follow-up. Daughter is aware we are sending orders for Home Health and may not have an answer today but we will follow-up on Monday. Freedom of choice form on the chart  Verbal ok    Sent via [employer] to [address] up these medications from [id number]- [place], [address]  1 brivaracetam  2 cholecalciferol (vitamin D3)  3 tamsulosin       [facility]    Next steps: Follow up   Please Call to arrange Follow-up   [phone]    CM to call the office on Monday for follow-up appointment      [facility]. Next steps: Follow up   Information sent to a couple of agencies will let you know who can accept for services - Expect Call on Monday. Follow up with Wendie Cason MD on 6/14/2022   Specialty: Internal Medicine Physician   [address]   [phone]   June 14, 2022 @ 11 AM, please bring discharge paperwork and medications. Please wear a mask. Care Management Interventions  PCP Verified by CM: Yes (Oct 2021)  Mode of Transport at Discharge:  Other (see comment) (daughters, Sandra Graham or Gail)  Transition of Care Consult (CM Consult): Discharge [address]: Yes  MyChart Signup:  (utilizes cane, walker, shower chair)  Physical Therapy Consult: Yes  Occupational Therapy Consult: Yes  Speech Therapy Consult: Yes  Support Systems: Child(yordan)  Confirm Follow Up Transport: Family (daughter, Sandra Graham)  The Plan for Transition of Care is Related to the Following Treatment Goals : pcp possible neurology, possible HH  The Patient and/or Patient Representative was Provided with a Choice of Provider and Agrees with the Discharge Plan?: Yes  Name of the Patient Representative Who was Provided with a Choice of Provider and Agrees with the Discharge Plan: pt, pt daughter, physician, nursing and rounds  Freedom of Choice List was Provided with Basic Dialogue that Supports the Patient's Individualized Plan of Care/Goals, Treatment Preferences and Shares the Quality Data Associated with the Providers?: Yes  Discharge Location  Patient Expects to be Discharged to[de-identified] Home with home health      Vibha ARECHIGA RN   [phone]     Addendum: 12:44PM   Initially daughter was to arrange transportation home   Nursing now indicates she needs a ride home. Patient is down for CT Scan -   Need to  arrange Lift - not sure when she will be ready to leave. To ask Nursing Supervisor to set up when ready.

## 2022-06-04 NOTE — PROGRESS NOTES
Care plan reviewed, patient progressing towards goals. Problem: Falls - Risk of  Goal: *Absence of Falls  Description: Document Renae Foster Fall Risk and appropriate interventions in the flowsheet. Outcome: Progressing Towards Goal  Note: Fall Risk Interventions:  Mobility Interventions: Patient to call before getting OOB    Mentation Interventions: Adequate sleep, hydration, pain control    Medication Interventions: Evaluate medications/consider consulting pharmacy    Elimination Interventions: Call light in reach    History of Falls Interventions: Door open when patient unattended         Problem: Patient Education: Go to Patient Education Activity  Goal: Patient/Family Education  Outcome: Progressing Towards Goal     Problem: General Infection Care Plan (Adult and Pediatric)  Goal: Improvement in signs and symptoms of infection  Outcome: Progressing Towards Goal  Goal: *Optimize nutritional status  Outcome: Progressing Towards Goal     Problem: Patient Education: Go to Patient Education Activity  Goal: Patient/Family Education  Outcome: Progressing Towards Goal     Problem: Pressure Injury - Risk of  Goal: *Prevention of pressure injury  Description: Document Derek Scale and appropriate interventions in the flowsheet.   Outcome: Progressing Towards Goal  Note: Pressure Injury Interventions:  Sensory Interventions: Assess changes in LOC    Moisture Interventions: Absorbent underpads    Activity Interventions: Increase time out of bed,PT/OT evaluation    Mobility Interventions: PT/OT evaluation    Nutrition Interventions: Document food/fluid/supplement intake,Offer support with meals,snacks and hydration    Friction and Shear Interventions: Minimize layers                Problem: Patient Education: Go to Patient Education Activity  Goal: Patient/Family Education  Outcome: Progressing Towards Goal     Problem: Patient Education: Go to Patient Education Activity  Goal: Patient/Family Education  Outcome: Progressing Towards Goal

## 2022-06-04 NOTE — DISCHARGE INSTRUCTIONS
Patient Education        Seizure: Care Instructions  Overview     Seizures are caused by abnormal patterns of electrical signals in the brain. They are different for each person. Seizures can affect movement, speech, vision, or awareness. Some people have only slight shaking of a hand and do not pass out. Other people may pass out and have shaking of the whole body. Some people appear to stare into space. They are awake, but they can't respond normally. Later, they may not remember what happened. You may need tests to identify the type and cause of the seizures. A seizure may occur only once, or you may have them more than one time. Taking medicines as directed and following up with your doctor may help keep you from having more seizures. The doctor has checked you carefully, but problems can develop later. If you notice any problems or new symptoms, get medical treatment right away. Follow-up care is a key part of your treatment and safety. Be sure to make and go to all appointments, and call your doctor if you are having problems. It's also a good idea to know your test results and keep a list of the medicines you take. How can you care for yourself at home? · Be safe with medicines. Take your medicines exactly as prescribed. Call your doctor if you think you are having a problem with your medicine. · Do not do any activity that could be dangerous to you or others until your doctor says it is safe to do so. For example, do not drive a car, operate machinery, swim, or climb ladders. · Be sure that anyone treating you for any health problem knows that you have had a seizure and what medicines you are taking for it. · Identify and avoid things that may make you more likely to have a seizure. These may include lack of sleep, alcohol or drug use, stress, or not eating. · If possible, take a shower instead of a bath. Having a seizure while in a bath can increase the risk of drowning.   When should you call for help?   Call 911 anytime you think you may need emergency care. For example, call if:    · You have another seizure.     · You have new symptoms, such as trouble walking, speaking, or thinking clearly. Call your doctor now or seek immediate medical care if:    · You are not acting normally. Watch closely for changes in your health, and be sure to contact your doctor if you have any problems. Where can you learn more? Go to http://www.gray.com/  Enter M769 in the search box to learn more about \"Seizure: Care Instructions. \"  Current as of: December 13, 2021               Content Version: 13.2  © 8195-8039 [employer]. Care instructions adapted under license by [employer] (which disclaims liability or warranty for this information). If you have questions about a medical condition or this instruction, always ask your healthcare professional. [address] any warranty or liability for your use of this information.

## 2022-06-04 NOTE — PROGRESS NOTES
Problem: Falls - Risk of  Goal: *Absence of Falls  Description: Document John Hobbs Fall Risk and appropriate interventions in the flowsheet.   6/4/2022 1120 by Janine Corral RN  Outcome: Progressing Towards Goal  Note: Fall Risk Interventions:  Mobility Interventions: Assess mobility with egress test,Bed/chair exit alarm,Communicate number of staff needed for ambulation/transfer,OT consult for ADLs,Patient to call before getting OOB,PT Consult for mobility concerns,PT Consult for assist device competence,Strengthening exercises (ROM-active/passive),Utilize walker, cane, or other assistive device    Mentation Interventions: Adequate sleep, hydration, pain control,Bed/chair exit alarm,Door open when patient unattended,Evaluate medications/consider consulting pharmacy,Eyeglasses and hearing aids,Familiar objects from home,Increase mobility,More frequent rounding,Reorient patient,Room close to nurse's station,Toileting rounds,Update white board    Medication Interventions: Assess postural VS orthostatic hypotension,Bed/chair exit alarm,Evaluate medications/consider consulting pharmacy,Patient to call before getting OOB,Teach patient to arise slowly    Elimination Interventions: Bed/chair exit alarm,Call light in reach,Elevated toilet seat,Patient to call for help with toileting needs,Toilet paper/wipes in reach,Toileting schedule/hourly rounds,Urinal in reach    History of Falls Interventions: Bed/chair exit alarm,Consult care management for discharge planning,Door open when patient unattended,Evaluate medications/consider consulting pharmacy,Investigate reason for fall,Room close to nurse's station      6/4/2022 1117 by Janine Corral RN  Outcome: Progressing Towards Goal  Note: Fall Risk Interventions:  Mobility Interventions: Assess mobility with egress test,Bed/chair exit alarm,Communicate number of staff needed for ambulation/transfer,OT consult for ADLs,Patient to call before getting OOB,PT Consult for mobility concerns,PT Consult for assist device competence,Strengthening exercises (ROM-active/passive),Utilize walker, cane, or other assistive device    Mentation Interventions: Adequate sleep, hydration, pain control,Bed/chair exit alarm,Door open when patient unattended,Evaluate medications/consider consulting pharmacy,Eyeglasses and hearing aids,Familiar objects from home,Increase mobility,More frequent rounding,Reorient patient,Room close to nurse's station,Toileting rounds,Update white board    Medication Interventions: Assess postural VS orthostatic hypotension,Bed/chair exit alarm,Evaluate medications/consider consulting pharmacy,Patient to call before getting OOB,Teach patient to arise slowly    Elimination Interventions: Bed/chair exit alarm,Call light in reach,Elevated toilet seat,Patient to call for help with toileting needs,Toilet paper/wipes in reach,Toileting schedule/hourly rounds,Urinal in reach    History of Falls Interventions: Bed/chair exit alarm,Consult care management for discharge planning,Door open when patient unattended,Evaluate medications/consider consulting pharmacy,Investigate reason for fall,Room close to nurse's station      6/4/2022 1113 by Alonzo Rodríguez RN  Outcome: Progressing Towards Goal  Note: Fall Risk Interventions:  Mobility Interventions: Assess mobility with egress test,Bed/chair exit alarm,Communicate number of staff needed for ambulation/transfer,OT consult for ADLs,Patient to call before getting OOB,PT Consult for mobility concerns,PT Consult for assist device competence,Strengthening exercises (ROM-active/passive),Utilize walker, cane, or other assistive device    Mentation Interventions: Adequate sleep, hydration, pain control,Bed/chair exit alarm,Door open when patient unattended,Evaluate medications/consider consulting pharmacy,Eyeglasses and hearing aids,Familiar objects from home,Increase mobility,More frequent rounding,Reorient patient,Room close to nurse's station,Toileting rounds,Update white board    Medication Interventions: Assess postural VS orthostatic hypotension,Bed/chair exit alarm,Evaluate medications/consider consulting pharmacy,Patient to call before getting OOB,Teach patient to arise slowly    Elimination Interventions: Bed/chair exit alarm,Call light in reach,Elevated toilet seat,Patient to call for help with toileting needs,Toilet paper/wipes in reach,Toileting schedule/hourly rounds,Urinal in reach    History of Falls Interventions: Bed/chair exit alarm,Consult care management for discharge planning,Door open when patient unattended,Evaluate medications/consider consulting pharmacy,Investigate reason for fall,Room close to nurse's station         Problem: Patient Education: Go to Patient Education Activity  Goal: Patient/Family Education  6/4/2022 1120 by Gwendolyn Vance RN  Outcome: Progressing Towards Goal  6/4/2022 1117 by Gwendolyn Vance RN  Outcome: Progressing Towards Goal  6/4/2022 1113 by Gwendolyn Vance RN  Outcome: Progressing Towards Goal     Problem: General Infection Care Plan (Adult and Pediatric)  Goal: Improvement in signs and symptoms of infection  6/4/2022 1120 by Gwendolyn Vance RN  Outcome: Progressing Towards Goal  6/4/2022 1117 by Gwendolyn Vance RN  Outcome: Progressing Towards Goal  6/4/2022 1113 by Gwendolyn Vance RN  Outcome: Progressing Towards Goal  Goal: *Optimize nutritional status  6/4/2022 1120 by Gwendolyn Vance RN  Outcome: Progressing Towards Goal  6/4/2022 1117 by Gwendolyn Vance RN  Outcome: Progressing Towards Goal  6/4/2022 1113 by Gwendolyn Vance RN  Outcome: Progressing Towards Goal     Problem: Patient Education: Go to Patient Education Activity  Goal: Patient/Family Education  6/4/2022 1120 by Gwendolyn Vance RN  Outcome: Progressing Towards Goal  6/4/2022 1117 by Gwendolyn Vance RN  Outcome: Progressing Towards Goal  6/4/2022 1113 by Gwendolyn Vance RN  Outcome: Progressing Towards Goal Problem: Pressure Injury - Risk of  Goal: *Prevention of pressure injury  Description: Document Derek Scale and appropriate interventions in the flowsheet.   6/4/2022 1120 by Rance Riedel, RN  Outcome: Progressing Towards Goal  Note: Pressure Injury Interventions:  Sensory Interventions: Assess changes in LOC,Chair cushion,Check visual cues for pain,Discuss PT/OT consult with provider,Float heels,Keep linens dry and wrinkle-free,Maintain/enhance activity level,Minimize linen layers,Monitor skin under medical devices,Pad between skin to skin,Assess need for specialty bed    Moisture Interventions: Absorbent underpads,Apply protective barrier, creams and emollients,Assess need for specialty bed,Check for incontinence Q2 hours and as needed,Limit adult briefs,Maintain skin hydration (lotion/cream),Minimize layers,Moisture barrier,Offer toileting Q_hr    Activity Interventions: Chair cushion,Increase time out of bed,PT/OT evaluation,Pressure redistribution bed/mattress(bed type)    Mobility Interventions: Chair cushion,Assess need for specialty bed,HOB 30 degrees or less,Pressure redistribution bed/mattress (bed type),PT/OT evaluation    Nutrition Interventions: Document food/fluid/supplement intake,Discuss nutritional consult with provider,Offer support with meals,snacks and hydration    Friction and Shear Interventions: Apply protective barrier, creams and emollients,Feet elevated on foot rest,HOB 30 degrees or less,Minimize layers             6/4/2022 1117 by Rance Riedel, RN  Outcome: Progressing Towards Goal  Note: Pressure Injury Interventions:  Sensory Interventions: Assess changes in LOC,Chair cushion,Check visual cues for pain,Discuss PT/OT consult with provider,Float heels,Keep linens dry and wrinkle-free,Maintain/enhance activity level,Minimize linen layers,Monitor skin under medical devices,Pad between skin to skin,Assess need for specialty bed    Moisture Interventions: Absorbent underpads,Apply protective barrier, creams and emollients,Assess need for specialty bed,Check for incontinence Q2 hours and as needed,Limit adult briefs,Maintain skin hydration (lotion/cream),Minimize Belenda Crest barrier,Offer toileting Q_hr    Activity Interventions: Chair cushion,Increase time out of bed,PT/OT evaluation,Pressure redistribution bed/mattress(bed type)    Mobility Interventions: Chair cushion,Assess need for specialty bed,HOB 30 degrees or less,Pressure redistribution bed/mattress (bed type),PT/OT evaluation    Nutrition Interventions: Document food/fluid/supplement intake,Discuss nutritional consult with provider,Offer support with meals,snacks and hydration    Friction and Shear Interventions: Apply protective barrier, creams and emollients,Feet elevated on foot rest,HOB 30 degrees or less,Minimize layers             6/4/2022 1113 by Araceli Hickey RN  Outcome: Progressing Towards Goal  Note: Pressure Injury Interventions:  Sensory Interventions: Assess changes in LOC,Chair cushion,Check visual cues for pain,Discuss PT/OT consult with provider,Float heels,Keep linens dry and wrinkle-free,Maintain/enhance activity level,Minimize linen layers,Monitor skin under medical devices,Pad between skin to skin,Assess need for specialty bed    Moisture Interventions: Absorbent underpads,Apply protective barrier, creams and emollients,Assess need for specialty bed,Check for incontinence Q2 hours and as needed,Limit adult briefs,Maintain skin hydration (lotion/cream),Minimize layers,Moisture barrier,Offer toileting Q_hr    Activity Interventions: Chair cushion,Increase time out of bed,PT/OT evaluation,Pressure redistribution bed/mattress(bed type)    Mobility Interventions: Chair cushion,Assess need for specialty bed,HOB 30 degrees or less,Pressure redistribution bed/mattress (bed type),PT/OT evaluation    Nutrition Interventions: Document food/fluid/supplement intake,Discuss nutritional consult with provider,Offer support with meals,snacks and hydration    Friction and Shear Interventions: Apply protective barrier, creams and emollients,Feet elevated on foot rest,HOB 30 degrees or less,Minimize layers                Problem: Patient Education: Go to Patient Education Activity  Goal: Patient/Family Education  6/4/2022 1120 by Tawanda Ha RN  Outcome: Progressing Towards Goal  6/4/2022 1117 by Tawanda Ha RN  Outcome: Progressing Towards Goal  6/4/2022 1113 by Tawanda Ha RN  Outcome: Progressing Towards Goal     Problem: Patient Education: Go to Patient Education Activity  Goal: Patient/Family Education  6/4/2022 1120 by Tawanda Ha RN  Outcome: Progressing Towards Goal  6/4/2022 1117 by Tawanda Ha RN  Outcome: Progressing Towards Goal  6/4/2022 1113 by Tawanda Ha RN  Outcome: Progressing Towards Goal     Problem: Patient Education: Go to Patient Education Activity  Goal: Patient/Family Education  6/4/2022 1120 by Tawanda Ha RN  Outcome: Progressing Towards Goal  6/4/2022 1117 by Tawanda Ha RN  Outcome: Progressing Towards Goal     Problem: TIA/CVA Stroke: 0-24 hours  Goal: Off Pathway (Use only if patient is Off Pathway)  6/4/2022 1120 by Tawanda Ha RN  Outcome: Progressing Towards Goal  6/4/2022 1117 by Tawanda Ha RN  Outcome: Progressing Towards Goal  Goal: Activity/Safety  6/4/2022 1120 by Tawanda Ha RN  Outcome: Progressing Towards Goal  6/4/2022 1117 by Tawanda Ha RN  Outcome: Progressing Towards Goal  Goal: Consults, if ordered  6/4/2022 1120 by Tawanda Ha RN  Outcome: Progressing Towards Goal  6/4/2022 1117 by Tawanda Ha RN  Outcome: Progressing Towards Goal  Goal: Diagnostic Test/Procedures  6/4/2022 1120 by Tawanda Ha RN  Outcome: Progressing Towards Goal  6/4/2022 1117 by Tawanda Ha RN  Outcome: Progressing Towards Goal  Goal: Nutrition/Diet  6/4/2022 1120 by Tawanda Leland, RN  Outcome: Progressing Towards Goal  6/4/2022 1117 by Christ Mandujano RN  Outcome: Progressing Towards Goal  Goal: Discharge Planning  6/4/2022 1120 by Christ Mandujano RN  Outcome: Progressing Towards Goal  6/4/2022 1117 by Christ Mandujano RN  Outcome: Progressing Towards Goal  Goal: Medications  6/4/2022 1120 by Christ Mandujano RN  Outcome: Progressing Towards Goal  6/4/2022 1117 by Christ Mandujano RN  Outcome: Progressing Towards Goal  Goal: Respiratory  6/4/2022 1120 by Christ Mandujano, RN  Outcome: Progressing Towards Goal  6/4/2022 1117 by Christ Mandujano RN  Outcome: Progressing Towards Goal  Goal: Treatments/Interventions/Procedures  6/4/2022 1120 by Christ Mandujano RN  Outcome: Progressing Towards Goal  6/4/2022 1117 by Christ Mandujano RN  Outcome: Progressing Towards Goal  Goal: Minimize risk of bleeding post-thrombolytic infusion  6/4/2022 1120 by Christ Mandujano RN  Outcome: Progressing Towards Goal  6/4/2022 1117 by Christ Mandujano RN  Outcome: Progressing Towards Goal  Goal: Monitor for complications post-thrombolytic infusion  6/4/2022 1120 by Christ Mandujano RN  Outcome: Progressing Towards Goal  6/4/2022 1117 by Christ Mandujano RN  Outcome: Progressing Towards Goal  Goal: Psychosocial  6/4/2022 1120 by Christ Mandujano, RN  Outcome: Progressing Towards Goal  6/4/2022 1117 by Christ Mandujano RN  Outcome: Progressing Towards Goal  Goal: *Hemodynamically stable  6/4/2022 1120 by Christ Mandujano, RN  Outcome: Progressing Towards Goal  6/4/2022 1117 by Christ Mandujano RN  Outcome: Progressing Towards Goal  Goal: *Neurologically stable  Description: Absence of additional neurological deficits    6/4/2022 1120 by Christ Mandujano, RN  Outcome: Progressing Towards Goal  6/4/2022 1117 by Christ Mandujano RN  Outcome: Progressing Towards Goal  Goal: *Verbalizes anxiety and depression are reduced or absent  6/4/2022 1120 by Christ Mandujano, RN  Outcome: Progressing Towards Goal  6/4/2022 1117 by Christ Mandujano RN  Outcome: Progressing Towards Goal  Goal: *Absence of Signs of Aspiration on Current Diet  6/4/2022 1120 by Clarissa Neff RN  Outcome: Progressing Towards Goal  6/4/2022 1117 by Clarissa Neff RN  Outcome: Progressing Towards Goal  Goal: *Absence of deep venous thrombosis signs and symptoms(Stroke Metric)  6/4/2022 1120 by Clarissa Neff RN  Outcome: Progressing Towards Goal  6/4/2022 1117 by Clarissa Neff RN  Outcome: Progressing Towards Goal  Goal: *Ability to perform ADLs and demonstrates progressive mobility and function  6/4/2022 1120 by Clarissa Neff RN  Outcome: Progressing Towards Goal  6/4/2022 1117 by Clarissa Neff RN  Outcome: Progressing Towards Goal  Goal: *Stroke education started(Stroke Metric)  6/4/2022 1120 by Clarissa Neff, RN  Outcome: Progressing Towards Goal  6/4/2022 1117 by Clarissa Neff RN  Outcome: Progressing Towards Goal  Goal: *Dysphagia screen performed(Stroke Metric)  6/4/2022 1120 by Clarissa Neff, RN  Outcome: Progressing Towards Goal  6/4/2022 1117 by Clarissa Neff RN  Outcome: Progressing Towards Goal  Goal: *Rehab consulted(Stroke Metric)  6/4/2022 1120 by Clarissa Neff, RN  Outcome: Progressing Towards Goal  6/4/2022 1117 by Clarissa Neff RN  Outcome: Progressing Towards Goal     Problem: TIA/CVA Stroke: Day 2 Until Discharge  Goal: Off Pathway (Use only if patient is Off Pathway)  6/4/2022 1120 by Clarissa Neff, RN  Outcome: Progressing Towards Goal  6/4/2022 1117 by Clarissa Neff RN  Outcome: Progressing Towards Goal  Goal: Activity/Safety  6/4/2022 1120 by Clarissa Neff RN  Outcome: Progressing Towards Goal  6/4/2022 1117 by Clarissa Neff RN  Outcome: Progressing Towards Goal  Goal: Diagnostic Test/Procedures  6/4/2022 1120 by Clarissa Neff, RN  Outcome: Progressing Towards Goal  6/4/2022 1117 by Clarissa Neff RN  Outcome: Progressing Towards Goal  Goal: Nutrition/Diet  6/4/2022 1120 by Clarissa Neff RN  Outcome: Progressing Towards Goal  6/4/2022 1117 by Yennifer Alvarado, RN  Outcome: Progressing Towards Goal  Goal: Discharge Planning  6/4/2022 1120 by Yennifer Alvarado, RN  Outcome: Progressing Towards Goal  6/4/2022 1117 by Yennifer Alvarado, RN  Outcome: Progressing Towards Goal  Goal: Medications  6/4/2022 1120 by Yennifer Alvarado, RN  Outcome: Progressing Towards Goal  6/4/2022 1117 by Yennifer Alvarado, RN  Outcome: Progressing Towards Goal  Goal: Respiratory  6/4/2022 1120 by Yennifer Alvarado, RN  Outcome: Progressing Towards Goal  6/4/2022 1117 by Yennifer Alvarado, RN  Outcome: Progressing Towards Goal  Goal: Treatments/Interventions/Procedures  6/4/2022 1120 by Yennifer Alvarado, RN  Outcome: Progressing Towards Goal  6/4/2022 1117 by Yennifer Alvarado, RN  Outcome: Progressing Towards Goal  Goal: Psychosocial  6/4/2022 1120 by Yennifer Alvarado, RN  Outcome: Progressing Towards Goal  6/4/2022 1117 by Yennifer Alvarado, RN  Outcome: Progressing Towards Goal  Goal: *Verbalizes anxiety and depression are reduced or absent  6/4/2022 1120 by Yennifer Alvarado, RN  Outcome: Progressing Towards Goal  6/4/2022 1117 by Yennifer Alvarado, RN  Outcome: Progressing Towards Goal  Goal: *Absence of aspiration  6/4/2022 1120 by Yennifer Alvarado, RN  Outcome: Progressing Towards Goal  6/4/2022 1117 by Yennifer Alvarado, RN  Outcome: Progressing Towards Goal  Goal: *Absence of deep venous thrombosis signs and symptoms(Stroke Metric)  6/4/2022 1120 by Yennifer Alvarado, RN  Outcome: Progressing Towards Goal  6/4/2022 1117 by Yennifer Alvarado, RN  Outcome: Progressing Towards Goal  Goal: *Optimal pain control at patient's stated goal  6/4/2022 1120 by Yennifer Alvarado, RN  Outcome: Progressing Towards Goal  6/4/2022 1117 by Yennifer Alvarado, RN  Outcome: Progressing Towards Goal  Goal: *Tolerating diet  6/4/2022 1120 by Yennifer Alvarado, RN  Outcome: Progressing Towards Goal  6/4/2022 1117 by Yennifer Alvarado, RN  Outcome: Progressing Towards Goal  Goal: *Ability to perform ADLs and demonstrates progressive mobility and function  6/4/2022 1120 by Liliana Gillespie RN  Outcome: Progressing Towards Goal  6/4/2022 1117 by Liliana Gillespie RN  Outcome: Progressing Towards Goal  Goal: *Stroke education continued(Stroke Metric)  6/4/2022 1120 by Liliana Gillespie RN  Outcome: Progressing Towards Goal  6/4/2022 1117 by Liliana Gillespie RN  Outcome: Progressing Towards Goal     Problem: Ischemic Stroke: Discharge Outcomes  Goal: *Verbalizes anxiety and depression are reduced or absent  6/4/2022 1120 by Liliana Gillespie RN  Outcome: Progressing Towards Goal  6/4/2022 1117 by Liliana Gillespie RN  Outcome: Progressing Towards Goal  Goal: *Verbalize understanding of risk factor modification(Stroke Metric)  6/4/2022 1120 by Liliana Gillespie RN  Outcome: Progressing Towards Goal  6/4/2022 1117 by Liliana Gillespie RN  Outcome: Progressing Towards Goal  Goal: *Hemodynamically stable  6/4/2022 1120 by Liliana Gillespie RN  Outcome: Progressing Towards Goal  6/4/2022 1117 by Liliana Gillespie RN  Outcome: Progressing Towards Goal  Goal: *Absence of aspiration pneumonia  6/4/2022 1120 by Liliana Gillespie RN  Outcome: Progressing Towards Goal  6/4/2022 1117 by Liliana Gillespie RN  Outcome: Progressing Towards Goal  Goal: *Aware of needed dietary changes  6/4/2022 1120 by Liliana Gillespie RN  Outcome: Progressing Towards Goal  6/4/2022 1117 by Liliana Gillespie RN  Outcome: Progressing Towards Goal  Goal: *Verbalize understanding of prescribed medications including anti-coagulants, anti-lipid, and/or anti-platelets(Stroke Metric)  6/4/2022 1120 by Liliana Gillespie RN  Outcome: Progressing Towards Goal  6/4/2022 1117 by Liliana Gillespie RN  Outcome: Progressing Towards Goal  Goal: *Tolerating diet  6/4/2022 1120 by Liliana Gillespie RN  Outcome: Progressing Towards Goal  6/4/2022 1117 by Liliana Gillespie RN  Outcome: Progressing Towards Goal  Goal: *Aware of follow-up diagnostics related to anticoagulants  6/4/2022 1120 by Levi Tyson Lawrence RN  Outcome: Progressing Towards Goal  6/4/2022 1117 by Araceli Hickey RN  Outcome: Progressing Towards Goal  Goal: *Ability to perform ADLs and demonstrates progressive mobility and function  6/4/2022 1120 by Araceli Hickey RN  Outcome: Progressing Towards Goal  6/4/2022 1117 by Araceli Hickey RN  Outcome: Progressing Towards Goal  Goal: *Absence of DVT(Stroke Metric)  6/4/2022 1120 by Araceli Hickey RN  Outcome: Progressing Towards Goal  6/4/2022 1117 by Araceli Hickey RN  Outcome: Progressing Towards Goal  Goal: *Absence of aspiration  6/4/2022 1120 by Araceli Hickey RN  Outcome: Progressing Towards Goal  6/4/2022 1117 by Araceli Hickey RN  Outcome: Progressing Towards Goal  Goal: *Optimal pain control at patient's stated goal  6/4/2022 1120 by Araceli Hickey RN  Outcome: Progressing Towards Goal  6/4/2022 1117 by Araceli Hickey RN  Outcome: Progressing Towards Goal  Goal: *Home safety concerns addressed  6/4/2022 1120 by Araceli Hickey RN  Outcome: Progressing Towards Goal  6/4/2022 1117 by Araceli Hickey RN  Outcome: Progressing Towards Goal  Goal: *Describes available resources and support systems  6/4/2022 1120 by Araceli Hickey RN  Outcome: Progressing Towards Goal  6/4/2022 1117 by Araceli Hickey RN  Outcome: Progressing Towards Goal  Goal: *Verbalizes understanding of activation of EMS(911) for stroke symptoms(Stroke Metric)  6/4/2022 1120 by Araceli Hickey RN  Outcome: Progressing Towards Goal  6/4/2022 1117 by Araceli Hickey RN  Outcome: Progressing Towards Goal  Goal: *Understands and describes signs and symptoms to report to providers(Stroke Metric)  6/4/2022 1120 by Araceli Hickey RN  Outcome: Progressing Towards Goal  6/4/2022 1117 by Araceli Hickey RN  Outcome: Progressing Towards Goal  Goal: *Neurolgocially stable (absence of additional neurological deficits)  6/4/2022 1120 by Araceli Hickey RN  Outcome: Progressing Towards Goal  6/4/2022 1117 by Brian Asher, Raman Johnson RN  Outcome: Progressing Towards Goal  Goal: *Verbalizes importance of follow-up with primary care physician(Stroke Metric)  6/4/2022 1120 by Lola Wade RN  Outcome: Progressing Towards Goal  6/4/2022 1117 by Lola Wade RN  Outcome: Progressing Towards Goal  Goal: *Smoking cessation discussed,if applicable(Stroke Metric)  6/4/2022 1120 by Lola Wade RN  Outcome: Progressing Towards Goal  6/4/2022 1117 by Lola Wade RN  Outcome: Progressing Towards Goal  Goal: *Depression screening completed(Stroke Metric)  6/4/2022 1120 by Lola Wade RN  Outcome: Progressing Towards Goal  6/4/2022 1117 by Lola Wade RN  Outcome: Progressing Towards Goal

## 2022-06-04 NOTE — DISCHARGE SUMMARY
Hospitalist Discharge Summary     Patient ID:  Skinny Willoughby  [id number]  76 y.o.  1945    PCP on record: Yolis Williamson MD    Admit date: 6/2/2022  Discharge date and time: 6/5/2022    Please note that this dictation was completed with [employer], the [employer] voice recognition software. Quite often unanticipated grammatical, syntax, homophones, and other interpretive errors are inadvertently transcribed by the computer software. Please disregard these errors. Please excuse any errors that have escaped final proofreading. Admission Diagnoses: Seizure [facility]) [R56.9]    Discharge Diagnoses: Active Problems:    Seizure ([address] (7/25/2018)           Hospital Course:       Seizure POA  Altered mental status POA due to   Post-ictal state POA  Likely due to Ativan administration and post ictal state. CT head interpreted independently- no evidence of temporal enhancement. Patient compliant with medications  Neurology was consulted,Keppra changed to brivateracetam, Continued lacosamide   CSF studies negative for infection. EEG 6/2/2022 showing occasional left frontotemporal slowing. CT Head 6/4 interval showed no development of stroke. Neurologist Dr Judith Roa stated suspicion for CVA is low and MRI may be obtained outpatient. Patient discharged with outpatient neurology follow up          Fever POA  UA negative for infection, CXR showed no acute process. CT head interpreted independently- no evidence of temporal enhancement. Lactic acid wnl. COVID, Influenza panel negative. Unclear if it was a true fever.  Does not need antibiotics      DVT POA  On eliquis at home,last dose 5/30/2022  Resume eliquis after LP        Microcytic hypochromic anemia POA  Likely At baseline  resume iron supplementation when able to tolerate oral intake         CONSULTATIONS:  None    Excerpted HPI from H&P of Fer Mccollum MD:  76 y.o.  female with PMH of seizures,anemia, DVT who presents to ED via EMS due to witnessed seizures. Patient currently has altered mental status and unable to provide meaningful history. I spoke with Bianka Haynes who provided history. Patient  had witnessed generalized tonic clonic seizure at 8:28 AM while still in the bed. No bowel bladder incontinence. Daughter noticed patient was asking for help before the seizure which is he usual behavior before having a seizure. Patient was c/o chills, cough, cold like symptoms for 2-3 Patient compliant with medications. Seizures are usually more prevalent in hot weather. Patient has had 1 dose of covid vaccine. She had another seizure en-route.      We were asked to admit for work up and evaluation of the above problems. ______________________________________________________________________  DISCHARGE SUMMARY/HOSPITAL COURSE:  for full details see H&P, daily progress notes, labs, consult notes. Visit Vitals  /72 (BP 1 Location: Left upper arm, BP Patient Position: At rest;Sitting)   Pulse 76   Temp 98.4 °F (36.9 °C)   Resp 20   Ht 5' 4\" (1.626 m)   Wt 53.8 kg (118 lb 8 oz)   SpO2 99%   BMI 20.34 kg/m²       _______________________________________________________________________  Patient seen and examined by me on discharge day. Pertinent Findings:  Gen:    Not in distress  Chest: Clear lungs  CVS:   Regular rhythm. No edema  Abd:  Soft, not distended, not tender  Neuro:  Alert with good insight. Oriented to person, place, and time   _______________________________________________________________________  DISCHARGE MEDICATIONS:   Discharge Medication List as of 6/4/2022  2:47 PM      START taking these medications    Details   tamsulosin (FLOMAX) 0.4 mg capsule Take 1 Capsule by mouth daily. , Normal, Disp-30 Capsule, R-0      brivaracetam (BRIVIACT) 50 mg tablet Take 1 Tablet by mouth two (2) times a day.  Max Daily Amount: 100 mg., Normal, Disp-60 Tablet, R-0      !! cholecalciferol, vitamin D3, (VITAMIN D3) 1,250 mcg (50,000 unit) tablet Take 1 Tablet by mouth every seven (7) days for 8 doses. , Normal, Disp-8 Tablet, R-0       !! - Potential duplicate medications found. Please discuss with provider. CONTINUE these medications which have NOT CHANGED    Details   !! cholecalciferol (VITAMIN D3) (1000 Units /25 mcg) tablet Take 1,000 Units by mouth two (2) times a day., Historical Med      apixaban (Eliquis) 5 mg tablet Take 5 mg by mouth two (2) times a day. Indications: blood clot in a deep vein of the extremities, Historical Med      ascorbic acid, vitamin C, (VITAMIN C) 500 mg tablet TAKE 1 TABLET BY MOUTH TWICE A DAY, Normal, Disp-180 Tablet, R-1      Vimpat 150 mg tab tablet TAKE 1 TABLET BY MOUTH 2 TIMES A DAY. MAX DAILY AMOUNT: 300 MG., Normal, Disp-60 Tablet, R-3This request is for a new prescription for a controlled substance as required by Federal/State law. DX Code Needed  . acetaminophen (TYLENOL) 325 mg tablet Take 2 Tablets by mouth every six (6) hours as needed for Pain., Normal, Disp-100 Tablet, R-3      ferrous sulfate (IRON, FERROUS SULFATE,) 325 mg (65 mg iron) tablet Take 1 Tab by mouth two (2) times a day., Normal, Disp-90 Tab, R-0       !! - Potential duplicate medications found. Please discuss with provider. STOP taking these medications       levETIRAcetam (KEPPRA) 750 mg tablet Comments:   Reason for Stopping:               My Recommended Diet, Activity, Wound Care, and follow-up labs are listed in the patient's Discharge Insturctions which I have personally completed and reviewed.     _______________________________________________________________________  DISPOSITION:     Home with Family:    Home with HH/PT/OT/RN: x   SNF/LTC:    LIVAN:    OTHER:        Condition at Discharge:  Stable  _______________________________________________________________________  Follow up with:   PCP : Yanique Fox MD  Follow-up Information     Follow up With Specialties Details Why Contact Info    Tran Patel Jesica Hyatt MD Internal Medicine Physician On 6/14/2022 June 14, 2022 @ 11 AM, please bring discharge paperwork and medications. Please wear a mask. [address]      [address] [facility]     CM to call the office on Monday for follow-up appointment  Please Call to arrange Follow-up   [facility] . Information sent to a couple of agencies will let you know who can accept for services - Expect Call on Monday.                Total time in minutes spent coordinating this discharge (includes going over instructions, follow-up, prescriptions, and preparing report for sign off to her PCP) :35 minutes    Signed:  Chloé Turcios MD

## 2022-06-04 NOTE — FACE TO FACE
Face to Face Order for [facility]           Pt Name  Gladys Mcgowan   Date of Birth 1945   Age  76 y.o. Medical Record Number  [id number]   Room Number  714/59   Admit date:  6/2/2022   Date of Face to Face: 6/4/2022     Admission Diagnosis:  Seizure     Diagnoses Present on Admission:  Seizure disorder     Past Medical History:   Diagnosis Date    Anemia     Arthritis     Asthma     Headache     Seizures ([address]       Visit Vitals  /75 (BP 1 Location: Left upper arm, BP Patient Position: At rest;Lying)   Pulse 76   Temp 97.9 °F (36.6 °C)   Resp 18   Ht 5' 4\" (1.626 m)   Wt 53.8 kg (118 lb 8 oz)   SpO2 98%   BMI 20.34 kg/m²         No Known Allergies          I certify that the patient needs home health care as prescribed below and I will not be following this patient in the Community.  I also certify that the patient is homebound as evidenced by    - Patient with poor safety awareness and is at risk for falls without assistance of another person and the use of an assistive device. Patient with poor ambulation endurance limiting their safe ability to ascend/descend the required number of steps to leave the home.   - Requires considerable and taxing effort to leave the home  and Requires the assistance of 1 or more persons to leave the home    - and also as noted in various sections of this medical record.  Dr. Kelley., MD  will be responsible for signing the Plan of Care and will follow/coordinate ongoing care.  Initial Orders for Care:  - physical therapy: strengthening, stretching/ROM, transfer training, gait/stair training, balance training and pt/caregiver education  - occupational therapy:  ADL safety (ie. cooking, bathing, dressing), ROM and pt/caregiver education  - Report to Gladys Ortiz MD     I certify that I am taking care of the patient today (Please see hospital Discharge Records for details of clinical issues pertaining to this order). ________________________________________________________________________  Suhail Kennesaw, MD

## 2022-06-04 NOTE — PROGRESS NOTES
Problem: Falls - Risk of  Goal: *Absence of Falls  Description: Document Juan Chamorro Fall Risk and appropriate interventions in the flowsheet.   6/4/2022 1117 by Dirk Montaño RN  Outcome: Progressing Towards Goal  Note: Fall Risk Interventions:  Mobility Interventions: Assess mobility with egress test,Bed/chair exit alarm,Communicate number of staff needed for ambulation/transfer,OT consult for ADLs,Patient to call before getting OOB,PT Consult for mobility concerns,PT Consult for assist device competence,Strengthening exercises (ROM-active/passive),Utilize walker, cane, or other assistive device    Mentation Interventions: Adequate sleep, hydration, pain control,Bed/chair exit alarm,Door open when patient unattended,Evaluate medications/consider consulting pharmacy,Eyeglasses and hearing aids,Familiar objects from home,Increase mobility,More frequent rounding,Reorient patient,Room close to nurse's station,Toileting rounds,Update white board    Medication Interventions: Assess postural VS orthostatic hypotension,Bed/chair exit alarm,Evaluate medications/consider consulting pharmacy,Patient to call before getting OOB,Teach patient to arise slowly    Elimination Interventions: Bed/chair exit alarm,Call light in reach,Elevated toilet seat,Patient to call for help with toileting needs,Toilet paper/wipes in reach,Toileting schedule/hourly rounds,Urinal in reach    History of Falls Interventions: Bed/chair exit alarm,Consult care management for discharge planning,Door open when patient unattended,Evaluate medications/consider consulting pharmacy,Investigate reason for fall,Room close to nurse's station      6/4/2022 1113 by Dirk Montaño RN  Outcome: Progressing Towards Goal  Note: Fall Risk Interventions:  Mobility Interventions: Assess mobility with egress test,Bed/chair exit alarm,Communicate number of staff needed for ambulation/transfer,OT consult for ADLs,Patient to call before getting OOB,PT Consult for mobility concerns,PT Consult for assist device competence,Strengthening exercises (ROM-active/passive),Utilize walker, cane, or other assistive device    Mentation Interventions: Adequate sleep, hydration, pain control,Bed/chair exit alarm,Door open when patient unattended,Evaluate medications/consider consulting pharmacy,Eyeglasses and hearing aids,Familiar objects from home,Increase mobility,More frequent rounding,Reorient patient,Room close to nurse's station,Toileting rounds,Update white board    Medication Interventions: Assess postural VS orthostatic hypotension,Bed/chair exit alarm,Evaluate medications/consider consulting pharmacy,Patient to call before getting OOB,Teach patient to arise slowly    Elimination Interventions: Bed/chair exit alarm,Call light in reach,Elevated toilet seat,Patient to call for help with toileting needs,Toilet paper/wipes in reach,Toileting schedule/hourly rounds,Urinal in reach    History of Falls Interventions: Bed/chair exit alarm,Consult care management for discharge planning,Door open when patient unattended,Evaluate medications/consider consulting pharmacy,Investigate reason for fall,Room close to nurse's station         Problem: Patient Education: Go to Patient Education Activity  Goal: Patient/Family Education  6/4/2022 1117 by Araceli Hickey RN  Outcome: Progressing Towards Goal  6/4/2022 1113 by Araceli Hickey RN  Outcome: Progressing Towards Goal     Problem: General Infection Care Plan (Adult and Pediatric)  Goal: Improvement in signs and symptoms of infection  6/4/2022 1117 by Araceli Hickey RN  Outcome: Progressing Towards Goal  6/4/2022 1113 by Araceli Hickey RN  Outcome: Progressing Towards Goal  Goal: *Optimize nutritional status  6/4/2022 1117 by Araceli Hickey RN  Outcome: Progressing Towards Goal  6/4/2022 1113 by Araceli Hickey RN  Outcome: Progressing Towards Goal     Problem: Patient Education: Go to Patient Education Activity  Goal: Patient/Family Education  6/4/2022 1117 by Araceli Hickey RN  Outcome: Progressing Towards Goal  6/4/2022 1113 by Araceli Hickey RN  Outcome: Progressing Towards Goal     Problem: Pressure Injury - Risk of  Goal: *Prevention of pressure injury  Description: Document Derek Scale and appropriate interventions in the flowsheet.   6/4/2022 1117 by Araceli Hickey RN  Outcome: Progressing Towards Goal  Note: Pressure Injury Interventions:  Sensory Interventions: Assess changes in LOC,Chair cushion,Check visual cues for pain,Discuss PT/OT consult with provider,Float heels,Keep linens dry and wrinkle-free,Maintain/enhance activity level,Minimize linen layers,Monitor skin under medical devices,Pad between skin to skin,Assess need for specialty bed    Moisture Interventions: Absorbent underpads,Apply protective barrier, creams and emollients,Assess need for specialty bed,Check for incontinence Q2 hours and as needed,Limit adult briefs,Maintain skin hydration (lotion/cream),Minimize layers,Moisture barrier,Offer toileting Q_hr    Activity Interventions: Chair cushion,Increase time out of bed,PT/OT evaluation,Pressure redistribution bed/mattress(bed type)    Mobility Interventions: Chair cushion,Assess need for specialty bed,HOB 30 degrees or less,Pressure redistribution bed/mattress (bed type),PT/OT evaluation    Nutrition Interventions: Document food/fluid/supplement intake,Discuss nutritional consult with provider,Offer support with meals,snacks and hydration    Friction and Shear Interventions: Apply protective barrier, creams and emollients,Feet elevated on foot rest,HOB 30 degrees or less,Minimize layers             6/4/2022 1113 by Araceli Hickey RN  Outcome: Progressing Towards Goal  Note: Pressure Injury Interventions:  Sensory Interventions: Assess changes in LOC,Chair cushion,Check visual cues for pain,Discuss PT/OT consult with provider,Float heels,Keep linens dry and wrinkle-free,Maintain/enhance activity level,Minimize linen layers,Monitor skin under medical devices,Pad between skin to skin,Assess need for specialty bed    Moisture Interventions: Absorbent underpads,Apply protective barrier, creams and emollients,Assess need for specialty bed,Check for incontinence Q2 hours and as needed,Limit adult briefs,Maintain skin hydration (lotion/cream),Minimize layers,Moisture barrier,Offer toileting Q_hr    Activity Interventions: Chair cushion,Increase time out of bed,PT/OT evaluation,Pressure redistribution bed/mattress(bed type)    Mobility Interventions: Chair cushion,Assess need for specialty bed,HOB 30 degrees or less,Pressure redistribution bed/mattress (bed type),PT/OT evaluation    Nutrition Interventions: Document food/fluid/supplement intake,Discuss nutritional consult with provider,Offer support with meals,snacks and hydration    Friction and Shear Interventions: Apply protective barrier, creams and emollients,Feet elevated on foot rest,HOB 30 degrees or less,Minimize layers                Problem: Patient Education: Go to Patient Education Activity  Goal: Patient/Family Education  6/4/2022 1117 by Liliana Gillespie RN  Outcome: Progressing Towards Goal  6/4/2022 1113 by Liliana Gillespie RN  Outcome: Progressing Towards Goal     Problem: Patient Education: Go to Patient Education Activity  Goal: Patient/Family Education  6/4/2022 1117 by Liliana Gillespie RN  Outcome: Progressing Towards Goal  6/4/2022 1113 by Liliana Gillespie RN  Outcome: Progressing Towards Goal     Problem: Patient Education: Go to Patient Education Activity  Goal: Patient/Family Education  Outcome: Progressing Towards Goal     Problem: TIA/CVA Stroke: 0-24 hours  Goal: Off Pathway (Use only if patient is Off Pathway)  Outcome: Progressing Towards Goal  Goal: Activity/Safety  Outcome: Progressing Towards Goal  Goal: Consults, if ordered  Outcome: Progressing Towards Goal  Goal: Diagnostic Test/Procedures  Outcome: Progressing Towards Goal  Goal: Nutrition/Diet  Outcome: Progressing Towards Goal  Goal: Discharge Planning  Outcome: Progressing Towards Goal  Goal: Medications  Outcome: Progressing Towards Goal  Goal: Respiratory  Outcome: Progressing Towards Goal  Goal: Treatments/Interventions/Procedures  Outcome: Progressing Towards Goal  Goal: Minimize risk of bleeding post-thrombolytic infusion  Outcome: Progressing Towards Goal  Goal: Monitor for complications post-thrombolytic infusion  Outcome: Progressing Towards Goal  Goal: Psychosocial  Outcome: Progressing Towards Goal  Goal: *Hemodynamically stable  Outcome: Progressing Towards Goal  Goal: *Neurologically stable  Description: Absence of additional neurological deficits    Outcome: Progressing Towards Goal  Goal: *Verbalizes anxiety and depression are reduced or absent  Outcome: Progressing Towards Goal  Goal: *Absence of Signs of Aspiration on Current Diet  Outcome: Progressing Towards Goal  Goal: *Absence of deep venous thrombosis signs and symptoms(Stroke Metric)  Outcome: Progressing Towards Goal  Goal: *Ability to perform ADLs and demonstrates progressive mobility and function  Outcome: Progressing Towards Goal  Goal: *Stroke education started(Stroke Metric)  Outcome: Progressing Towards Goal  Goal: *Dysphagia screen performed(Stroke Metric)  Outcome: Progressing Towards Goal  Goal: *Rehab consulted(Stroke Metric)  Outcome: Progressing Towards Goal     Problem: TIA/CVA Stroke: Day 2 Until Discharge  Goal: Off Pathway (Use only if patient is Off Pathway)  Outcome: Progressing Towards Goal  Goal: Activity/Safety  Outcome: Progressing Towards Goal  Goal: Diagnostic Test/Procedures  Outcome: Progressing Towards Goal  Goal: Nutrition/Diet  Outcome: Progressing Towards Goal  Goal: Discharge Planning  Outcome: Progressing Towards Goal  Goal: Medications  Outcome: Progressing Towards Goal  Goal: Respiratory  Outcome: Progressing Towards Goal  Goal: Treatments/Interventions/Procedures  Outcome: Progressing Towards Goal  Goal: Psychosocial  Outcome: Progressing Towards Goal  Goal: *Verbalizes anxiety and depression are reduced or absent  Outcome: Progressing Towards Goal  Goal: *Absence of aspiration  Outcome: Progressing Towards Goal  Goal: *Absence of deep venous thrombosis signs and symptoms(Stroke Metric)  Outcome: Progressing Towards Goal  Goal: *Optimal pain control at patient's stated goal  Outcome: Progressing Towards Goal  Goal: *Tolerating diet  Outcome: Progressing Towards Goal  Goal: *Ability to perform ADLs and demonstrates progressive mobility and function  Outcome: Progressing Towards Goal  Goal: *Stroke education continued(Stroke Metric)  Outcome: Progressing Towards Goal     Problem: Ischemic Stroke: Discharge Outcomes  Goal: *Verbalizes anxiety and depression are reduced or absent  Outcome: Progressing Towards Goal  Goal: *Verbalize understanding of risk factor modification(Stroke Metric)  Outcome: Progressing Towards Goal  Goal: *Hemodynamically stable  Outcome: Progressing Towards Goal  Goal: *Absence of aspiration pneumonia  Outcome: Progressing Towards Goal  Goal: *Aware of needed dietary changes  Outcome: Progressing Towards Goal  Goal: *Verbalize understanding of prescribed medications including anti-coagulants, anti-lipid, and/or anti-platelets(Stroke Metric)  Outcome: Progressing Towards Goal  Goal: *Tolerating diet  Outcome: Progressing Towards Goal  Goal: *Aware of follow-up diagnostics related to anticoagulants  Outcome: Progressing Towards Goal  Goal: *Ability to perform ADLs and demonstrates progressive mobility and function  Outcome: Progressing Towards Goal  Goal: *Absence of DVT(Stroke Metric)  Outcome: Progressing Towards Goal  Goal: *Absence of aspiration  Outcome: Progressing Towards Goal  Goal: *Optimal pain control at patient's stated goal  Outcome: Progressing Towards Goal  Goal: *Home safety concerns addressed  Outcome: Progressing Towards Goal  Goal: *Describes available resources and support systems  Outcome: Progressing Towards Goal  Goal: *Verbalizes understanding of activation of EMS(911) for stroke symptoms(Stroke Metric)  Outcome: Progressing Towards Goal  Goal: *Understands and describes signs and symptoms to report to providers(Stroke Metric)  Outcome: Progressing Towards Goal  Goal: *Neurolgocially stable (absence of additional neurological deficits)  Outcome: Progressing Towards Goal  Goal: *Verbalizes importance of follow-up with primary care physician(Stroke Metric)  Outcome: Progressing Towards Goal  Goal: *Smoking cessation discussed,if applicable(Stroke Metric)  Outcome: Progressing Towards Goal  Goal: *Depression screening completed(Stroke Metric)  Outcome: Progressing Towards Goal

## 2022-06-04 NOTE — PROGRESS NOTES
0400 Bladder scanned for 657 ml, patient voided 450 ml  after bladder scanned. Scanned patient after she voided for 150 ml, will continue to monitor.

## 2022-06-04 NOTE — PROGRESS NOTES
0730- bedside shift change report given to nicolas (oncoming nurse) by jennifer (offgoing nurse). Report included the following information SBAR, karadex and overnight events. 2339- patient seen and assessed. Resting comfortably in bed at this time. Will continue to monitor, no complaints    0945- assisted patient to bedside commode with walker output of 350, pvr 260, weight obtained, linens changed and chg bath completed. Patient returned back to bed and bed alarm set. Offered patient purewick-pt refused. 1030- provided patient with a soda    1050- round completed with MD li. Discussed urinary retention Flomax added. Can d/c cardiac monitor. Discussed toe nails curling around foot. MD spoke with neurologist ok to d/c mri and get a repeat ct then ok for discharge    [id number] patient set up for lunch. Resting comfortably in bed.     1220- patient's daughter at bedside. MD li to speak with patient's daughter. 1243- patient off floor for CT scan. 1310- patient back from CT scan. [phone]- patient discharged safely off floor with daughter and pct matteo. Discharge education provided to patient and daughter.  Discussed fu apts, emergency situatuons, stroke education, seizure educations and adjustments to meds [employer] called for patient and to stop at pharmacy for meds

## 2022-06-04 NOTE — PROGRESS NOTES
Comprehensive Nutrition Assessment    Type and Reason for Visit: Initial,Positive nutrition screen    Nutrition Recommendations/Plan:   1. Continue regular diet. 2. Please document % meals and supplements consumed in flowsheet I/O's under intake. Nutrition Assessment:     6/4: Chart reviewed; med noted for sepsis, seizure disorder on admission, AMS. RD received an auto-nutrition referral for 'unsure' weight loss. Hx of fe-deficiency anemia. Currently receiving a regular diet. More recent weight trends have been stable. Appears to have lost majority of weight from 2016 to 2018. No data found. Last Weight Metric  Weight Loss Metrics 6/2/2022 1/1/2020 8/30/2018 8/6/2018 7/25/2018 10/18/2016 9/20/2016   Today's Wt 122 lb 3.2 oz 129 lb 8 oz 127 lb 122 lb 126 lb 8 oz 147 lb 147 lb   BMI 20.98 kg/m2 22.23 kg/m2 21.8 kg/m2 20.94 kg/m2 21.71 kg/m2 24.46 kg/m2 24.46 kg/m2     Nutrition Related Findings:    BM: none documented; Labs: reviewed; Meds: reviewed Wound Type: None    Current Nutrition Intake & Therapies:        ADULT DIET Regular    Anthropometric Measures:  Height: 5' 4\" (162.6 cm)  Ideal Body Weight (IBW): 120 lbs (55 kg)     Current Body Wt:  55.4 kg (122 lb 2.2 oz), 101.8 % IBW. Current BMI (kg/m2): 21                          BMI Category: Underweight (BMI less than 22) age over 72    Estimated Daily Nutrient Needs:  Energy Requirements Based On: Formula  Weight Used for Energy Requirements: Current  Energy (kcal/day): 1300 (BMR 1015 x 1. 3AF)  Weight Used for Protein Requirements: Current  Protein (g/day): 55(1.0 g/kg bw)  Method Used for Fluid Requirements: 1 ml/kcal  Fluid (ml/day): 1300 ml/day    Nutrition Diagnosis:   No nutrition diagnosis at this time     Nutrition Interventions:   Food and/or Nutrient Delivery: Continue current diet  Nutrition Education/Counseling: No recommendations at this time  Coordination of Nutrition Care: Continue to monitor while inpatient       Goals:     Goals: PO intake 50% or greater,by next RD assessment       Nutrition Monitoring and Evaluation:   Behavioral-Environmental Outcomes: None identified  Food/Nutrient Intake Outcomes: Food and nutrient intake  Physical Signs/Symptoms Outcomes: Biochemical data,Skin,Weight    Discharge Planning:    Continue current diet    James Kraus RD  Contact:

## 2022-06-06 ENCOUNTER — HOME HEALTH ADMISSION (OUTPATIENT)
Dept: HOME HEALTH SERVICES | Facility: HOME HEALTH | Age: 77
End: 2022-06-06
Payer: MEDICARE

## 2022-06-06 LAB
GLUCOSE CSF-MCNC: 59 MG/DL (ref 40–70)
PROT CSF-MCNC: 45 MG/DL (ref 15–45)
TUBE # CSF: 1
TUBE # CSF: 1

## 2022-06-07 LAB
BACTERIA SPEC CULT: NORMAL
BACTERIA SPEC CULT: NORMAL
LEVETIRACETAM SERPL-MCNC: 15.7 UG/ML (ref 10–40)
SERVICE CMNT-IMP: NORMAL
SERVICE CMNT-IMP: NORMAL
VIT B6 SERPL-MCNC: 3.1 UG/L (ref 3.4–65.2)

## 2022-06-08 ENCOUNTER — HOME CARE VISIT (OUTPATIENT)
Dept: SCHEDULING | Facility: HOME HEALTH | Age: 77
End: 2022-06-08
Payer: MEDICARE

## 2022-06-08 VITALS
HEART RATE: 77 BPM | HEIGHT: 65 IN | SYSTOLIC BLOOD PRESSURE: 98 MMHG | TEMPERATURE: 97.3 F | OXYGEN SATURATION: 96 % | RESPIRATION RATE: 16 BRPM | BODY MASS INDEX: 20.83 KG/M2 | WEIGHT: 125 LBS | DIASTOLIC BLOOD PRESSURE: 60 MMHG

## 2022-06-08 LAB — LACOSAMIDE SERPL-MCNC: 3.8 UG/ML (ref 5–10)

## 2022-06-08 PROCEDURE — G0151 HHCP-SERV OF PT,EA 15 MIN: HCPCS

## 2022-06-08 PROCEDURE — 3331090002 HH PPS REVENUE DEBIT

## 2022-06-08 PROCEDURE — 400018 HH-NO PAY CLAIM PROCEDURE

## 2022-06-08 PROCEDURE — 400013 HH SOC

## 2022-06-08 PROCEDURE — 3331090001 HH PPS REVENUE CREDIT

## 2022-06-09 ENCOUNTER — HOME CARE VISIT (OUTPATIENT)
Dept: SCHEDULING | Facility: HOME HEALTH | Age: 77
End: 2022-06-09
Payer: MEDICARE

## 2022-06-09 VITALS
TEMPERATURE: 98.1 F | HEART RATE: 72 BPM | SYSTOLIC BLOOD PRESSURE: 133 MMHG | OXYGEN SATURATION: 96 % | DIASTOLIC BLOOD PRESSURE: 72 MMHG

## 2022-06-09 PROCEDURE — G0152 HHCP-SERV OF OT,EA 15 MIN: HCPCS

## 2022-06-09 PROCEDURE — 3331090002 HH PPS REVENUE DEBIT

## 2022-06-09 PROCEDURE — 3331090001 HH PPS REVENUE CREDIT

## 2022-06-10 LAB
BACTERIA SPEC CULT: NORMAL
GRAM STN SPEC: NORMAL
GRAM STN SPEC: NORMAL
SERVICE CMNT-IMP: NORMAL

## 2022-06-10 PROCEDURE — 3331090001 HH PPS REVENUE CREDIT

## 2022-06-10 PROCEDURE — 3331090002 HH PPS REVENUE DEBIT

## 2022-06-11 PROCEDURE — 3331090001 HH PPS REVENUE CREDIT

## 2022-06-11 PROCEDURE — 3331090002 HH PPS REVENUE DEBIT

## 2022-06-12 PROCEDURE — 3331090001 HH PPS REVENUE CREDIT

## 2022-06-12 PROCEDURE — 3331090002 HH PPS REVENUE DEBIT

## 2022-06-13 ENCOUNTER — HOME CARE VISIT (OUTPATIENT)
Dept: SCHEDULING | Facility: HOME HEALTH | Age: 77
End: 2022-06-13
Payer: MEDICARE

## 2022-06-13 VITALS
SYSTOLIC BLOOD PRESSURE: 108 MMHG | HEART RATE: 89 BPM | OXYGEN SATURATION: 97 % | TEMPERATURE: 96.9 F | DIASTOLIC BLOOD PRESSURE: 56 MMHG

## 2022-06-13 PROCEDURE — 3331090002 HH PPS REVENUE DEBIT

## 2022-06-13 PROCEDURE — 3331090001 HH PPS REVENUE CREDIT

## 2022-06-13 PROCEDURE — G0151 HHCP-SERV OF PT,EA 15 MIN: HCPCS

## 2022-06-14 ENCOUNTER — TELEPHONE (OUTPATIENT)
Dept: CASE MANAGEMENT | Age: 77
End: 2022-06-14

## 2022-06-14 PROCEDURE — 3331090002 HH PPS REVENUE DEBIT

## 2022-06-14 PROCEDURE — 3331090001 HH PPS REVENUE CREDIT

## 2022-06-14 NOTE — TELEPHONE ENCOUNTER
CM called patient by telephone to perform post discharge assessment and for the purpose of follow up call from inpatient discharge to check on environmental challenges/medications/appointment follow up/and questions/concerns. The call was answered by  CM left VM for patient to return call. This was CM last attempt will close case if patient or family call back will assist with questions or concerns. Please note patient had a PCP appointment today 6/14/22 @ 1100 and HH was to start services after visit, per chart does not looks like patient kept appointment.        [address] [facility], [address] [phone]

## 2022-06-15 ENCOUNTER — HOME CARE VISIT (OUTPATIENT)
Dept: SCHEDULING | Facility: HOME HEALTH | Age: 77
End: 2022-06-15
Payer: MEDICARE

## 2022-06-15 VITALS
TEMPERATURE: 97.4 F | OXYGEN SATURATION: 94 % | SYSTOLIC BLOOD PRESSURE: 98 MMHG | HEART RATE: 77 BPM | DIASTOLIC BLOOD PRESSURE: 60 MMHG

## 2022-06-15 PROCEDURE — 3331090001 HH PPS REVENUE CREDIT

## 2022-06-15 PROCEDURE — 3331090002 HH PPS REVENUE DEBIT

## 2022-06-15 PROCEDURE — G0158 HHC OT ASSISTANT EA 15: HCPCS

## 2022-06-16 PROCEDURE — 3331090002 HH PPS REVENUE DEBIT

## 2022-06-16 PROCEDURE — 3331090001 HH PPS REVENUE CREDIT

## 2022-06-17 PROCEDURE — 3331090002 HH PPS REVENUE DEBIT

## 2022-06-17 PROCEDURE — 3331090001 HH PPS REVENUE CREDIT

## 2022-06-18 PROCEDURE — 3331090002 HH PPS REVENUE DEBIT

## 2022-06-18 PROCEDURE — 3331090001 HH PPS REVENUE CREDIT

## 2022-06-19 PROCEDURE — 3331090001 HH PPS REVENUE CREDIT

## 2022-06-19 PROCEDURE — 3331090002 HH PPS REVENUE DEBIT

## 2022-06-20 PROCEDURE — 3331090001 HH PPS REVENUE CREDIT

## 2022-06-20 PROCEDURE — 3331090002 HH PPS REVENUE DEBIT

## 2022-06-21 ENCOUNTER — HOME CARE VISIT (OUTPATIENT)
Dept: SCHEDULING | Facility: HOME HEALTH | Age: 77
End: 2022-06-21
Payer: MEDICARE

## 2022-06-21 VITALS
TEMPERATURE: 97.7 F | DIASTOLIC BLOOD PRESSURE: 62 MMHG | OXYGEN SATURATION: 98 % | SYSTOLIC BLOOD PRESSURE: 108 MMHG | HEART RATE: 68 BPM

## 2022-06-21 PROCEDURE — 3331090001 HH PPS REVENUE CREDIT

## 2022-06-21 PROCEDURE — 3331090002 HH PPS REVENUE DEBIT

## 2022-06-21 PROCEDURE — G0158 HHC OT ASSISTANT EA 15: HCPCS

## 2022-06-22 PROCEDURE — 3331090002 HH PPS REVENUE DEBIT

## 2022-06-22 PROCEDURE — 3331090001 HH PPS REVENUE CREDIT

## 2022-06-23 PROCEDURE — 3331090001 HH PPS REVENUE CREDIT

## 2022-06-23 PROCEDURE — 3331090002 HH PPS REVENUE DEBIT

## 2022-06-24 ENCOUNTER — HOME CARE VISIT (OUTPATIENT)
Dept: SCHEDULING | Facility: HOME HEALTH | Age: 77
End: 2022-06-24
Payer: MEDICARE

## 2022-06-24 PROCEDURE — 3331090001 HH PPS REVENUE CREDIT

## 2022-06-24 PROCEDURE — 3331090002 HH PPS REVENUE DEBIT

## 2022-06-25 PROCEDURE — 3331090001 HH PPS REVENUE CREDIT

## 2022-06-25 PROCEDURE — 3331090002 HH PPS REVENUE DEBIT

## 2022-06-26 PROCEDURE — 3331090001 HH PPS REVENUE CREDIT

## 2022-06-26 PROCEDURE — 3331090002 HH PPS REVENUE DEBIT

## 2022-06-27 PROCEDURE — 3331090002 HH PPS REVENUE DEBIT

## 2022-06-27 PROCEDURE — 3331090001 HH PPS REVENUE CREDIT

## 2022-06-28 ENCOUNTER — HOME CARE VISIT (OUTPATIENT)
Dept: SCHEDULING | Facility: HOME HEALTH | Age: 77
End: 2022-06-28
Payer: MEDICARE

## 2022-06-28 ENCOUNTER — HOME CARE VISIT (OUTPATIENT)
Dept: HOME HEALTH SERVICES | Facility: HOME HEALTH | Age: 77
End: 2022-06-28
Payer: MEDICARE

## 2022-06-28 VITALS
HEART RATE: 62 BPM | DIASTOLIC BLOOD PRESSURE: 66 MMHG | TEMPERATURE: 96.8 F | SYSTOLIC BLOOD PRESSURE: 108 MMHG | RESPIRATION RATE: 20 BRPM | OXYGEN SATURATION: 97 %

## 2022-06-28 PROCEDURE — 3331090001 HH PPS REVENUE CREDIT

## 2022-06-28 PROCEDURE — 3331090002 HH PPS REVENUE DEBIT

## 2022-06-28 PROCEDURE — G0151 HHCP-SERV OF PT,EA 15 MIN: HCPCS

## 2022-06-29 ENCOUNTER — HOME CARE VISIT (OUTPATIENT)
Dept: SCHEDULING | Facility: HOME HEALTH | Age: 77
End: 2022-06-29
Payer: MEDICARE

## 2022-06-29 VITALS
SYSTOLIC BLOOD PRESSURE: 112 MMHG | TEMPERATURE: 97.8 F | HEART RATE: 76 BPM | DIASTOLIC BLOOD PRESSURE: 76 MMHG | OXYGEN SATURATION: 97 %

## 2022-06-29 DIAGNOSIS — R56.9 SEIZURE (HCC): ICD-10-CM

## 2022-06-29 PROCEDURE — 3331090002 HH PPS REVENUE DEBIT

## 2022-06-29 PROCEDURE — 3331090001 HH PPS REVENUE CREDIT

## 2022-06-29 PROCEDURE — G0152 HHCP-SERV OF OT,EA 15 MIN: HCPCS

## 2022-06-29 NOTE — TELEPHONE ENCOUNTER
Duplicate request: Vitamin D3 #8 (1 tab every 7 days) was sent to [facility] #[id number] on 06/04/2022     Last visit 07/07/2021 MD Yasmeen Mueller   Next appointment Nothing scheduled   Previous refill encounter(s)   07/07/2021 Tylenol #100 with 3 refills,  01/05/2022 Vimpat #60 with 3 refills,  02/22/2022 Vitamin C #180 with 1 refill,  06/04/2022:   - Briviact #60,   - Flomax #30 - both was written by by Dr. Shane Boone. For Pharmacy Admin Tracking Only     Intervention Detail: Discontinued Rx: 1, reason: Duplicate Therapy and New Rx: 5, reason: Patient Preference   Time Spent (min): 10        Requested Prescriptions     Pending Prescriptions Disp Refills    acetaminophen (TYLENOL) 325 mg tablet 100 Tablet 0     Sig: Take 2 Tablets by mouth every six (6) hours as needed for Pain.  ascorbic acid, vitamin C, (VITAMIN C) 500 mg tablet 60 Tablet 0     Sig: Take 1 Tablet by mouth two (2) times a day.  brivaracetam (BRIVIACT) 50 mg tablet 60 Tablet 0     Sig: Take 1 Tablet by mouth two (2) times a day. Max Daily Amount: 100 mg.  tamsulosin (FLOMAX) 0.4 mg capsule 30 Capsule 0     Sig: Take 1 Capsule by mouth daily.  lacosamide (Vimpat) 150 mg tab tablet 60 Tablet 0     Sig: TAKE 1 TABLET BY MOUTH 2 TIMES A DAY. MAX DAILY AMOUNT: 300 MG.  cholecalciferol, vitamin D3, (VITAMIN D3) 1,250 mcg (50,000 unit) tablet 8 Tablet 0     Sig: Take 1 Tablet by mouth every seven (7) days for 8 doses. ----- Message from Hector Ordonez sent at 6/29/2022 11:47 AM EDT -----  Subject: Refill Request    QUESTIONS  Name of Medication? acetaminophen (TYLENOL) 325 mg tablet  Patient-reported dosage and instructions? as needed   How many days do you have left? 0  Preferred Pharmacy? [facility] #[id number]  Pharmacy phone number (if available)? [phone]  ---------------------------------------------------------------------------  --------------,  Name of Medication?  Vimpat 150 mg tab tablet  Patient-reported dosage and instructions? one tablet twice a day  How many days do you have left? 0  Preferred Pharmacy? [facility] #[id number]  Pharmacy phone number (if available)? [phone]  ---------------------------------------------------------------------------  --------------,  Name of Medication? ascorbic acid, vitamin C, (VITAMIN C) 500 mg tablet  Patient-reported dosage and instructions? one tablet twice a day  How many days do you have left? 0  Preferred Pharmacy? [facility] #[id number]  Pharmacy phone number (if available)? [phone]  ---------------------------------------------------------------------------  --------------,  Name of Medication? brivaracetam (BRIVIACT) 50 mg tablet  Patient-reported dosage and instructions? one tablet twice a day   How many days do you have left? 0  Preferred Pharmacy? [facility] #[id number]  Pharmacy phone number (if available)? [phone]  ---------------------------------------------------------------------------  --------------,  Name of Medication? tamsulosin (FLOMAX) 0.4 mg capsule  Patient-reported dosage and instructions? one tablet a day  How many days do you have left? 0  Preferred Pharmacy? [id number]  Pharmacy phone number (if available)? [phone]  ---------------------------------------------------------------------------  --------------,  Name of Medication? cholecalciferol, vitamin D3, (VITAMIN D3) 1,250 mcg   (50,000 unit) tablet  Patient-reported dosage and instructions? one tablet twice a day  How many days do you have left? 0  Preferred Pharmacy? [id number]  Pharmacy phone number (if available)? [phone]  ---------------------------------------------------------------------------  --------------  CALL BACK INFO  What is the best way for the office to contact you? OK to leave message on   voicemail  Preferred Call Back Phone Number?  [phone]  ---------------------------------------------------------------------------  --------------  SCRIPT ANSWERS  Relationship to Patient?  Self

## 2022-06-30 PROCEDURE — 3331090002 HH PPS REVENUE DEBIT

## 2022-06-30 PROCEDURE — 3331090001 HH PPS REVENUE CREDIT

## 2022-07-01 PROCEDURE — 3331090002 HH PPS REVENUE DEBIT

## 2022-07-01 PROCEDURE — 3331090001 HH PPS REVENUE CREDIT

## 2022-07-02 PROCEDURE — 3331090001 HH PPS REVENUE CREDIT

## 2022-07-02 PROCEDURE — 3331090002 HH PPS REVENUE DEBIT

## 2022-07-02 RX ORDER — LACOSAMIDE 150 MG/1
TABLET ORAL
Qty: 60 TABLET | Refills: 0 | Status: SHIPPED | OUTPATIENT
Start: 2022-07-02 | End: 2022-09-13 | Stop reason: SDUPTHER

## 2022-07-02 RX ORDER — ACETAMINOPHEN 325 MG/1
650 TABLET ORAL
Qty: 100 TABLET | Refills: 0 | Status: SHIPPED | OUTPATIENT
Start: 2022-07-02 | End: 2022-07-14

## 2022-07-02 RX ORDER — TAMSULOSIN HYDROCHLORIDE 0.4 MG/1
0.4 CAPSULE ORAL DAILY
Qty: 30 CAPSULE | Refills: 0 | Status: SHIPPED | OUTPATIENT
Start: 2022-07-02 | End: 2022-08-06

## 2022-07-02 RX ORDER — ASCORBIC ACID 500 MG
500 TABLET ORAL 2 TIMES DAILY
Qty: 60 TABLET | Refills: 0 | Status: SHIPPED | OUTPATIENT
Start: 2022-07-02 | End: 2022-08-06

## 2022-07-03 PROCEDURE — 3331090002 HH PPS REVENUE DEBIT

## 2022-07-03 PROCEDURE — 3331090001 HH PPS REVENUE CREDIT

## 2022-07-04 PROCEDURE — 3331090002 HH PPS REVENUE DEBIT

## 2022-07-04 PROCEDURE — 3331090001 HH PPS REVENUE CREDIT

## 2022-07-05 PROCEDURE — 3331090001 HH PPS REVENUE CREDIT

## 2022-07-05 PROCEDURE — 3331090002 HH PPS REVENUE DEBIT

## 2022-07-06 PROCEDURE — 3331090002 HH PPS REVENUE DEBIT

## 2022-07-06 PROCEDURE — 3331090001 HH PPS REVENUE CREDIT

## 2022-07-07 PROCEDURE — 3331090002 HH PPS REVENUE DEBIT

## 2022-07-07 PROCEDURE — 3331090001 HH PPS REVENUE CREDIT

## 2022-07-08 ENCOUNTER — HOME CARE VISIT (OUTPATIENT)
Dept: SCHEDULING | Facility: HOME HEALTH | Age: 77
End: 2022-07-08
Payer: MEDICARE

## 2022-07-08 VITALS
OXYGEN SATURATION: 97 % | TEMPERATURE: 96.8 F | SYSTOLIC BLOOD PRESSURE: 118 MMHG | HEART RATE: 73 BPM | DIASTOLIC BLOOD PRESSURE: 78 MMHG | RESPIRATION RATE: 20 BRPM

## 2022-07-08 PROCEDURE — G0151 HHCP-SERV OF PT,EA 15 MIN: HCPCS

## 2022-07-08 PROCEDURE — 3331090003 HH PPS REVENUE ADJ

## 2022-07-08 PROCEDURE — 400013 HH SOC

## 2022-07-08 PROCEDURE — 3331090001 HH PPS REVENUE CREDIT

## 2022-07-08 PROCEDURE — 3331090002 HH PPS REVENUE DEBIT

## 2022-07-09 PROCEDURE — 3331090001 HH PPS REVENUE CREDIT

## 2022-07-09 PROCEDURE — 3331090002 HH PPS REVENUE DEBIT

## 2022-07-10 PROCEDURE — 3331090001 HH PPS REVENUE CREDIT

## 2022-07-10 PROCEDURE — 3331090002 HH PPS REVENUE DEBIT

## 2022-07-14 RX ORDER — ACETAMINOPHEN 80 MG
TABLET,CHEWABLE ORAL
Qty: 100 TABLET | Refills: 3 | Status: SHIPPED | OUTPATIENT
Start: 2022-07-14 | End: 2022-09-13 | Stop reason: SDUPTHER

## 2022-09-13 ENCOUNTER — VIRTUAL VISIT (OUTPATIENT)
Dept: INTERNAL MEDICINE CLINIC | Age: 77
End: 2022-09-13
Payer: MEDICARE

## 2022-09-13 DIAGNOSIS — R56.9 SEIZURE (HCC): ICD-10-CM

## 2022-09-13 DIAGNOSIS — D50.0 IRON DEFICIENCY ANEMIA DUE TO CHRONIC BLOOD LOSS: Primary | ICD-10-CM

## 2022-09-13 PROCEDURE — G8427 DOCREV CUR MEDS BY ELIG CLIN: HCPCS | Performed by: INTERNAL MEDICINE

## 2022-09-13 PROCEDURE — G8432 DEP SCR NOT DOC, RNG: HCPCS | Performed by: INTERNAL MEDICINE

## 2022-09-13 PROCEDURE — 99213 OFFICE O/P EST LOW 20 MIN: CPT | Performed by: INTERNAL MEDICINE

## 2022-09-13 PROCEDURE — G8536 NO DOC ELDER MAL SCRN: HCPCS | Performed by: INTERNAL MEDICINE

## 2022-09-13 PROCEDURE — G8420 CALC BMI NORM PARAMETERS: HCPCS | Performed by: INTERNAL MEDICINE

## 2022-09-13 PROCEDURE — G8400 PT W/DXA NO RESULTS DOC: HCPCS | Performed by: INTERNAL MEDICINE

## 2022-09-13 PROCEDURE — 1101F PT FALLS ASSESS-DOCD LE1/YR: CPT | Performed by: INTERNAL MEDICINE

## 2022-09-13 PROCEDURE — 1090F PRES/ABSN URINE INCON ASSESS: CPT | Performed by: INTERNAL MEDICINE

## 2022-09-13 RX ORDER — LACOSAMIDE 150 MG/1
TABLET ORAL
Qty: 60 TABLET | Refills: 3 | Status: SHIPPED | OUTPATIENT
Start: 2022-09-13

## 2022-09-13 RX ORDER — ACETAMINOPHEN 325 MG/1
TABLET ORAL
Qty: 100 TABLET | Refills: 3 | Status: SHIPPED | OUTPATIENT
Start: 2022-09-13

## 2022-09-13 RX ORDER — TAMSULOSIN HYDROCHLORIDE 0.4 MG/1
0.4 CAPSULE ORAL DAILY
Qty: 30 CAPSULE | Refills: 0 | Status: SHIPPED | OUTPATIENT
Start: 2022-09-13

## 2022-09-13 RX ORDER — MELATONIN
1000 2 TIMES DAILY
Qty: 60 TABLET | Refills: 12 | Status: SHIPPED | OUTPATIENT
Start: 2022-09-13

## 2022-09-13 RX ORDER — LANOLIN ALCOHOL/MO/W.PET/CERES
325 CREAM (GRAM) TOPICAL 2 TIMES DAILY
Qty: 90 TABLET | Refills: 0 | Status: SHIPPED | OUTPATIENT
Start: 2022-09-13 | End: 2022-10-23 | Stop reason: SDUPTHER

## 2022-09-13 NOTE — PROGRESS NOTES
Claude Neve is a 76 y.o. female being evaluated by a Virtual Visit (video visit) encounter to address concerns as mentioned above. A caregiver was present when appropriate. Due to this being a TeleHealth encounter (During GRAFV-79 public health emergency), evaluation of the following organ systems was limited: Vitals/Constitutional/EENT/Resp/CV/GI//MS/Neuro/Skin/Heme-Lymph-Imm. Pursuant to the emergency declaration under the [address] [place] [address], [address] [place] [address] and the [employer] Act, this Virtual Visit was conducted with patient's (and/or legal guardian's) consent, to reduce the risk of exposure to COVID-19 and provide necessary medical care. Services were provided through a video synchronous discussion virtually to substitute for in-person encounter. --Anne Arevalo MD on 9/13/2022 at 3:22 PM    An electronic signature was used to authenticate this note. Claude Neve is a 76 y.o. female and presents with Seizure  . Subjective:    Seizure Review:  Patient presents for evaluation of seizures. Patient has been stable without any new seizures reported. Patient has tolerated his medication thus far. She has a history of DVT    Review of Systems  Constitutional: negative for fevers, chills, anorexia and weight loss  Eyes:   negative for visual disturbance and irritation  ENT:   negative for tinnitus,sore throat,nasal congestion,ear pains. hoarseness  Respiratory:  negative for cough, hemoptysis, dyspnea,wheezing  CV:   negative for chest pain, palpitations, lower extremity edema  GI:   negative for nausea, vomiting, diarrhea, abdominal pain,melena  Endo:               negative for polyuria,polydipsia,polyphagia,heat intolerance  Genitourinary: negative for frequency, dysuria and hematuria  Integument:  negative for rash and pruritus  Hematologic:  negative for easy bruising and gum/nose bleeding  Musculoskel: negative for myalgias, arthralgias, back pain, muscle weakness, joint pain  Neurological:  negative for headaches, dizziness, vertigo, memory problems and gait   Behavl/Psych: negative for feelings of anxiety, depression, mood changes    Past Medical History:   Diagnosis Date    Anemia     Arthritis     Asthma     Headache     Seizures ([address]      Past Surgical History:   Procedure Laterality Date    NEUROLOGICAL PROCEDURE UNLISTED       Social History     Socioeconomic History    Marital status:    Tobacco Use    Smoking status: Heavy Smoker     Packs/day: 0.00     Types: Cigarettes    Smokeless tobacco: Never   Substance and Sexual Activity    Alcohol use: No    Drug use: No    Sexual activity: Not Currently     History reviewed. No pertinent family history. Current Outpatient Medications   Medication Sig Dispense Refill    Vitamin C 500 mg chewable tablet TAKE 1 TABLET BY MOUTH TWO TIMES A DAY. 60 Tablet 3    tamsulosin (FLOMAX) 0.4 mg capsule TAKE 1 CAPSULE BY MOUTH EVERY DAY 30 Capsule 0    Pain Relief, acetaminophen, 325 mg tablet TAKE 2 TABLETS BY MOUTH EVERY SIX (6) HOURS AS NEEDED FOR PAIN. 100 Tablet 3    brivaracetam (BRIVIACT) 50 mg tablet Take 1 Tablet by mouth two (2) times a day. Max Daily Amount: 100 mg. 60 Tablet 0    lacosamide (Vimpat) 150 mg tab tablet TAKE 1 TABLET BY MOUTH 2 TIMES A DAY. MAX DAILY AMOUNT: 300 MG. 60 Tablet 0    cholecalciferol (VITAMIN D3) (1000 Units /25 mcg) tablet Take 1,000 Units by mouth two (2) times a day. apixaban (ELIQUIS) 5 mg tablet Take 5 mg by mouth two (2) times a day. Indications: blood clot in a deep vein of the extremities      ferrous sulfate (IRON, FERROUS SULFATE,) 325 mg (65 mg iron) tablet Take 1 Tab by mouth two (2) times a day. 90 Tab 0     No Known Allergies    Objective: There were no vitals taken for this visit.   Physical Exam:   General appearance - alert, well appearing, and in no distress  Mental status - alert, oriented to person, place, and time  EYE-BRENDA, EOMI, corneas normal, no foreign bodies  ENT-ENT exam normal, no neck nodes or sinus tenderness  Nose - normal and patent, no erythema, discharge or polyps  Mouth - mucous membranes moist, pharynx normal without lesions  Skin-Warm and dry. no hyperpigmentation, vitiligo, or suspicious lesions  Neuro -alert, oriented, normal speech, no focal findings or movement disorder noted  Neck-normal C-spine, no tenderness, full ROM without pain        Results for orders placed or performed during the hospital encounter of 06/02/22   COVID-19 WITH INFLUENZA A/B   Result Value Ref Range    SARS-CoV-2 by PCR Not detected NOTD      Influenza A by PCR Not detected      Influenza B by PCR Not detected     CULTURE, BLOOD    Specimen: Blood   Result Value Ref Range    Special Requests: NO SPECIAL REQUESTS      Culture result: NO GROWTH 5 DAYS     CULTURE, BLOOD    Specimen: Blood   Result Value Ref Range    Special Requests: NO SPECIAL REQUESTS      Culture result: NO GROWTH 5 DAYS     CULTURE, CSF W GRAM STAIN    Specimen: Cerebrospinal Fluid   Result Value Ref Range    Special Requests: NO SPECIAL REQUESTS      GRAM STAIN RARE WBCS SEEN      GRAM STAIN NO ORGANISMS SEEN      Culture result: NO GROWTH 7 DAYS Culture performed on Unspun Fluid     MENINGITIS PATHOGENS PANEL, CSF (BY PCR)   Result Value Ref Range    Escherichia coli K1 Not detected NOTD      Haemophilus Influenzae Not detected NOTD      Listeria Monocytogenes Not detected NOTD      Neisseria Meningitidis Not detected NOTD      Streptococcus Agalactiae Not detected NOTD      Streptococcus Pneumoniae Not detected NOTD      Cytomegalovirus Not detected NOTD      Enterovirus Not detected NOTD      Herpes Simplex Virus 1 Not detected NOTD      Herpes Simplex Virus 2 Not detected NOTD      Human Herpesvirus 6 Not detected NOTD      Human Parechovirus Not detected NOTD      Varicella Zoster Virus Not detected NOTD      Crypto. neoformans/gattii Not detected NOTD     CBC WITH AUTOMATED DIFF   Result Value Ref Range    WBC 8.8 3.6 - 11.0 K/uL    RBC 4.09 3.80 - 5.20 M/uL    HGB 9.6 (L) 11.5 - 16.0 g/dL    HCT 32.4 (L) 35.0 - 47.0 %    MCV 79.2 (L) 80.0 - 99.0 FL    MCH 23.5 (L) 26.0 - 34.0 PG    MCHC 29.6 (L) 30.0 - 36.5 g/dL    RDW 16.4 (H) 11.5 - 14.5 %    PLATELET 379 272 - 393 K/uL    MPV 8.8 (L) 8.9 - 12.9 FL    NRBC 0.0 0  WBC    ABSOLUTE NRBC 0.00 0.00 - 0.01 K/uL    NEUTROPHILS 81 (H) 32 - 75 %    LYMPHOCYTES 13 12 - 49 %    MONOCYTES 5 5 - 13 %    EOSINOPHILS 1 0 - 7 %    BASOPHILS 0 0 - 1 %    IMMATURE GRANULOCYTES 0 0.0 - 0.5 %    ABS. NEUTROPHILS 7.1 1.8 - 8.0 K/UL    ABS. LYMPHOCYTES 1.2 0.8 - 3.5 K/UL    ABS. MONOCYTES 0.5 0.0 - 1.0 K/UL    ABS. EOSINOPHILS 0.0 0.0 - 0.4 K/UL    ABS. BASOPHILS 0.0 0.0 - 0.1 K/UL    ABS. IMM. GRANS. 0.0 0.00 - 0.04 K/UL    DF AUTOMATED     METABOLIC PANEL, COMPREHENSIVE   Result Value Ref Range    Sodium 136 136 - 145 mmol/L    Potassium 3.9 3.5 - 5.1 mmol/L    Chloride 101 97 - 108 mmol/L    CO2 31 21 - 32 mmol/L    Anion gap 4 (L) 5 - 15 mmol/L    Glucose 117 (H) 65 - 100 mg/dL    BUN 7 6 - 20 MG/DL    Creatinine 0.82 0.55 - 1.02 MG/DL    BUN/Creatinine ratio 9 (L) 12 - 20      GFR est AA >60 >60 ml/min/1.73m2    GFR est non-AA >60 >60 ml/min/1.73m2    Calcium 8.8 8.5 - 10.1 MG/DL    Bilirubin, total 0.4 0.2 - 1.0 MG/DL    ALT (SGPT) 11 (L) 12 - 78 U/L    AST (SGOT) 16 15 - 37 U/L    Alk.  phosphatase 87 45 - 117 U/L    Protein, total 7.2 6.4 - 8.2 g/dL    Albumin 3.2 (L) 3.5 - 5.0 g/dL    Globulin 4.0 2.0 - 4.0 g/dL    A-G Ratio 0.8 (L) 1.1 - 2.2     TROPONIN-HIGH SENSITIVITY   Result Value Ref Range    Troponin-High Sensitivity 7 0 - 51 ng/L   URINALYSIS W/ RFLX MICROSCOPIC   Result Value Ref Range    Color YELLOW/STRAW      Appearance CLEAR CLEAR      Specific gravity 1.010      pH (UA) 8.5 (H) 5.0 - 8.0      Protein Negative NEG mg/dL    Glucose Negative NEG mg/dL    Ketone Negative NEG mg/dL    Bilirubin Negative NEG      Blood Negative NEG      Urobilinogen 1.0 0.2 - 1.0 EU/dL    Nitrites Negative NEG      Leukocyte Esterase Negative NEG     LACOSAMIDE   Result Value Ref Range    Lacosamide 3.8 (L) 5.0 - 10.0 ug/mL   LACTIC ACID   Result Value Ref Range    Lactic acid 1.0 0.4 - 2.0 MMOL/L   METABOLIC PANEL, COMPREHENSIVE   Result Value Ref Range    Sodium 139 136 - 145 mmol/L    Potassium 3.5 3.5 - 5.1 mmol/L    Chloride 103 97 - 108 mmol/L    CO2 29 21 - 32 mmol/L    Anion gap 7 5 - 15 mmol/L    Glucose 107 (H) 65 - 100 mg/dL    BUN 6 6 - 20 MG/DL    Creatinine 0.80 0.55 - 1.02 MG/DL    BUN/Creatinine ratio 8 (L) 12 - 20      GFR est AA >60 >60 ml/min/1.73m2    GFR est non-AA >60 >60 ml/min/1.73m2    Calcium 8.6 8.5 - 10.1 MG/DL    Bilirubin, total 0.5 0.2 - 1.0 MG/DL    ALT (SGPT) 12 12 - 78 U/L    AST (SGOT) 14 (L) 15 - 37 U/L    Alk.  phosphatase 81 45 - 117 U/L    Protein, total 6.7 6.4 - 8.2 g/dL    Albumin 3.0 (L) 3.5 - 5.0 g/dL    Globulin 3.7 2.0 - 4.0 g/dL    A-G Ratio 0.8 (L) 1.1 - 2.2     LEVETIRACETAM (KEPPRA)   Result Value Ref Range    Levetiracetam (Keppra) 15.7 10.0 - 40.0 ug/mL   VITAMIN D, 25 HYDROXY   Result Value Ref Range    Vitamin D 25-Hydroxy 9.0 (L) 30 - 100 ng/mL   CK   Result Value Ref Range     (H) 26 - 192 U/L   VITAMIN B6   Result Value Ref Range    Vitamin B6 3.1 (L) 3.4 - 46.4 ug/L   METABOLIC PANEL, BASIC   Result Value Ref Range    Sodium 142 136 - 145 mmol/L    Potassium 3.5 3.5 - 5.1 mmol/L    Chloride 106 97 - 108 mmol/L    CO2 28 21 - 32 mmol/L    Anion gap 8 5 - 15 mmol/L    Glucose 96 65 - 100 mg/dL    BUN 6 6 - 20 MG/DL    Creatinine 0.73 0.55 - 1.02 MG/DL    BUN/Creatinine ratio 8 (L) 12 - 20      GFR est AA >60 >60 ml/min/1.73m2    GFR est non-AA >60 >60 ml/min/1.73m2    Calcium 8.5 8.5 - 10.1 MG/DL   MAGNESIUM   Result Value Ref Range    Magnesium 1.8 1.6 - 2.4 mg/dL   PHOSPHORUS   Result Value Ref Range    Phosphorus 2.7 2.6 - 4.7 MG/DL HEPATIC FUNCTION PANEL   Result Value Ref Range    Protein, total 6.7 6.4 - 8.2 g/dL    Albumin 2.9 (L) 3.5 - 5.0 g/dL    Globulin 3.8 2.0 - 4.0 g/dL    A-G Ratio 0.8 (L) 1.1 - 2.2      Bilirubin, total 0.7 0.2 - 1.0 MG/DL    Bilirubin, direct 0.1 0.0 - 0.2 MG/DL    Alk. phosphatase 83 45 - 117 U/L    AST (SGOT) 16 15 - 37 U/L    ALT (SGPT) 9 (L) 12 - 78 U/L   CBC WITH AUTOMATED DIFF   Result Value Ref Range    WBC 7.3 3.6 - 11.0 K/uL    RBC 3.81 3.80 - 5.20 M/uL    HGB 9.1 (L) 11.5 - 16.0 g/dL    HCT 30.1 (L) 35.0 - 47.0 %    MCV 79.0 (L) 80.0 - 99.0 FL    MCH 23.9 (L) 26.0 - 34.0 PG    MCHC 30.2 30.0 - 36.5 g/dL    RDW 16.5 (H) 11.5 - 14.5 %    PLATELET 575 698 - 241 K/uL    MPV 8.6 (L) 8.9 - 12.9 FL    NRBC 0.0 0  WBC    ABSOLUTE NRBC 0.00 0.00 - 0.01 K/uL    NEUTROPHILS 66 32 - 75 %    LYMPHOCYTES 22 12 - 49 %    MONOCYTES 10 5 - 13 %    EOSINOPHILS 1 0 - 7 %    BASOPHILS 1 0 - 1 %    IMMATURE GRANULOCYTES 0 0.0 - 0.5 %    ABS. NEUTROPHILS 4.9 1.8 - 8.0 K/UL    ABS. LYMPHOCYTES 1.6 0.8 - 3.5 K/UL    ABS. MONOCYTES 0.8 0.0 - 1.0 K/UL    ABS. EOSINOPHILS 0.1 0.0 - 0.4 K/UL    ABS. BASOPHILS 0.0 0.0 - 0.1 K/UL    ABS. IMM.  GRANS. 0.0 0.00 - 0.04 K/UL    DF AUTOMATED     PROTEIN, CSF   Result Value Ref Range    Tube No. 1      Protein,CSF 45 15 - 45 MG/DL   GLUCOSE, CSF   Result Value Ref Range    Tube No. 1      Glucose,CSF 59 40 - 70 MG/DL   CELL COUNT, CSF   Result Value Ref Range    CSF TUBE NO. 3      CSF COLOR PINK (A) COL      SPUN COLOR CLEAR (A) COL      CSF APPEARANCE HAZY (A) CLEAR      CSF RBCs 6,588 (H) 0 /cu mm    CSF WBCs 33 (H) 0 - 5 /cu mm    CSF Neutrophils 81 (H) 0 - 7 %    CSF LYMPH 17 (L) 28 - 96 %    CSF MONO 2 (L) 16 - 56 %   LIPID PANEL   Result Value Ref Range    Cholesterol, total 170 <200 MG/DL    Triglyceride 82 <150 MG/DL    HDL Cholesterol 76 MG/DL    LDL, calculated 77.6 0 - 100 MG/DL    VLDL, calculated 16.4 MG/DL    CHOL/HDL Ratio 2.2 0.0 - 5.0     HEMOGLOBIN A1C WITH EAG Result Value Ref Range    Hemoglobin A1c 5.5 4.0 - 5.6 %    Est. average glucose 111 mg/dL   GLUCOSE, POC   Result Value Ref Range    Glucose (POC) 119 (H) 65 - 117 mg/dL    Performed by Abram Mello    GLUCOSE, POC   Result Value Ref Range    Glucose (POC) 110 65 - 117 mg/dL    Performed by Cosme Walters    GLUCOSE, POC   Result Value Ref Range    Glucose (POC) 105 65 - 117 mg/dL    Performed by Evelin Platt (TRV RN)    EKG, 12 LEAD, INITIAL   Result Value Ref Range    Ventricular Rate 88 BPM    Atrial Rate 88 BPM    P-R Interval 126 ms    QRS Duration 80 ms    Q-T Interval 322 ms    QTC Calculation (Bezet) 389 ms    Calculated P Axis 69 degrees    Calculated R Axis -12 degrees    Calculated T Axis 54 degrees    Diagnosis       Normal sinus rhythm with sinus arrhythmia  Nonspecific T wave abnormality  When compared with ECG of 01-JAN-2020 03:46,  premature supraventricular complexes are no longer present  Confirmed by Neena Kumar ([id number]) on 6/3/2022 10:40:52 PM     EKG, 12 LEAD, INITIAL   Result Value Ref Range    Ventricular Rate 88 BPM    Atrial Rate 88 BPM    P-R Interval 152 ms    QRS Duration 80 ms    Q-T Interval 384 ms    QTC Calculation (Bezet) 464 ms    Calculated P Axis 78 degrees    Calculated R Axis -35 degrees    Calculated T Axis 8 degrees    Diagnosis       Normal sinus rhythm with sinus arrhythmia  Left axis deviation  Nonspecific T wave abnormality  Abnormal ECG  When compared with ECG of 02-JUN-2022 09:14,  QT has lengthened  Confirmed by Neena Kumar ([id number]) on 6/3/2022 10:42:27 PM     DUPLEX CAROTID BILATERAL   Result Value Ref Range    Right cca dist sys 78.7 cm/s    Right CCA dist barber 19.7 cm/s    Right CCA prox sys 74.4 cm/s    Right CCA prox barber 16.4 cm/s    Right eca sys 58.0 cm/s    RIGHT EXTERNAL CAROTID ARTERY D 5.47 cm/s    Right ICA dist sys 59.0 cm/s    Right ICA dist barber 17.9 cm/s    Right ICA mid sys 34.7 cm/s    Right ICA mid barber 0.0 cm/s    Right ICA prox sys 59.9 cm/s    Right ICA prox barber 14.4 cm/s    Right vertebral sys 45.0 cm/s    RIGHT VERTEBRAL ARTERY D 14.99 cm/s    Right ICA/CCA sys 0.8 no units    Left CCA dist sys 50.0 cm/s    Left CCA dist barber 12.5 cm/s    Left CCA prox sys 68.7 cm/s    Left CCA prox barber 8.7 cm/s    Left ECA sys 33.7 cm/s    LEFT EXTERNAL CAROTID ARTERY D 0.00 cm/s    Left ICA dist sys 71.2 cm/s    Left ICA dist barber 18.7 cm/s    Left ICA mid sys 71.2 cm/s    Left ICA mid barber 21.2 cm/s    Left ICA prox sys 96.2 cm/s    Left ICA prox barber 28.7 cm/s    Left vertebral sys 52.1 cm/s    LEFT VERTEBRAL ARTERY D 14.07 cm/s    Left ICA/CCA sys 1.40 no units   ECHO ADULT COMPLETE   Result Value Ref Range    IVSd 0.9 0.6 - 0.9 cm    LVIDd 4.6 3.9 - 5.3 cm    LVIDs 2.2 cm    LVOT Diameter 1.8 cm    LVPWd 0.9 0.6 - 0.9 cm    LV Ejection Fraction A4C 67 %    LV EDV A4C 76 mL    LV ESV A4C 25 mL    LVOT Peak Gradient 3 mmHg    LVOT Peak Velocity 0.9 m/s    RVSP 22 mmHg    LA Diameter 2.5 cm    LA Volume 4C 14 (A) 22 - 52 mL    Est. RA Pressure 5 mmHg    AV Area by Planimetry 2.0 cm2    MV Area by Planimetry 3.3 cm2    MV A Velocity 0.57 m/s    MV E Wave Deceleration Time 142.8 ms    MV E Velocity 0.42 m/s    MV PHT 41.4 ms    MV Peak Gradient 1 mmHg    MV Mean Gradient 1 mmHg    MV Max Velocity 0.5 m/s    MV Mean Velocity 0.4 m/s    MV VTI 17.4 cm    PV Peak Gradient 3 mmHg    PV Max Velocity 0.9 m/s    TR Peak Gradient 18 mmHg    TR Max Velocity 2.04 m/s    Aortic Root 2.5 cm    Fractional Shortening 2D 52 28 - 44 %    LV ESV Index A4C 16 mL/m2    LV EDV Index A4C 48 mL/m2    LVIDd Index 2.89 cm/m2    LVIDs Index 1.38 cm/m2    LV RWT Ratio 0.39     LV Mass 2D 137.7 67 - 162 g    LV Mass 2D Index 86.6 43 - 95 g/m2    MV E/A 0.74     LVOT Area 2.5 cm2    LA Volume Index 4C 9 (A) 16 - 34 mL/m2    LA Size Index 1.57 cm/m2    LA/AO Root Ratio 1.00     Ao Root Index 1.57 cm/m2    MIESHA/BSA 1.26 cm2/m2    MV Area Index 2.1 cm2/m2    MV Area by PHT 5.3 cm2 Assessment/Plan:    ICD-10-CM ICD-9-CM    1. Seizure ([facility] [address]  R56.9 780.39         No orders of the defined types were placed in this encounter. call if any problems,Take 81mg aspirin daily  There are no Patient Instructions on file for this visit. I have reviewed with the patient details of the assessment and plan and all questions were answered. Relevent patient education was performed. The most recent lab findings were reviewed with the patient. An After Visit Summary was printed and given to the patient.

## 2022-10-23 RX ORDER — LANOLIN ALCOHOL/MO/W.PET/CERES
CREAM (GRAM) TOPICAL
Qty: 90 TABLET | Refills: 0 | Status: SHIPPED | OUTPATIENT
Start: 2022-10-23

## 2022-11-16 RX ORDER — LANOLIN ALCOHOL/MO/W.PET/CERES
325 CREAM (GRAM) TOPICAL 2 TIMES DAILY
Qty: 180 TABLET | Refills: 1 | Status: SHIPPED | OUTPATIENT
Start: 2022-11-16

## 2022-11-16 NOTE — TELEPHONE ENCOUNTER
Pharmacy is requesting a 80 d/s    Last Visit: 9/13/22 with MD Lanny Cerda  Next Appointment: none    Requested Prescriptions     Pending Prescriptions Disp Refills    ferrous sulfate 325 mg (65 mg iron) tablet 180 Tablet 1     Sig: Take 1 Tablet by mouth two (2) times a day. For [id number] in place:   Recommendation Provided To:    Intervention Detail: New Rx: 1, reason: Patient Preference  Gap Closed?:   Intervention Accepted By:   Time Spent (min): 5

## 2022-12-05 DIAGNOSIS — R56.9 SEIZURE (HCC): ICD-10-CM

## 2022-12-06 RX ORDER — BRIVARACETAM 50 MG/1
TABLET, FILM COATED ORAL
Qty: 60 TABLET | Refills: 0 | Status: SHIPPED | OUTPATIENT
Start: 2022-12-06 | End: 2023-03-06

## 2022-12-28 RX ORDER — TAMSULOSIN HYDROCHLORIDE 0.4 MG/1
CAPSULE ORAL
Qty: 30 CAPSULE | Refills: 0 | Status: SHIPPED | OUTPATIENT
Start: 2022-12-28

## 2023-01-03 RX ORDER — APIXABAN 5 MG/1
TABLET, FILM COATED ORAL
Qty: 180 TABLET | Refills: 1 | Status: SHIPPED | OUTPATIENT
Start: 2023-01-03

## 2023-01-06 DIAGNOSIS — R56.9 SEIZURE (HCC): ICD-10-CM

## 2023-01-08 RX ORDER — BRIVARACETAM 50 MG/1
TABLET, FILM COATED ORAL
Qty: 60 TABLET | Refills: 0 | Status: SHIPPED | OUTPATIENT
Start: 2023-01-08 | End: 2023-04-08

## 2023-01-09 DIAGNOSIS — R56.9 SEIZURE (HCC): ICD-10-CM

## 2023-01-19 ENCOUNTER — VIRTUAL VISIT (OUTPATIENT)
Dept: INTERNAL MEDICINE CLINIC | Age: 78
End: 2023-01-19
Payer: MEDICARE

## 2023-01-19 DIAGNOSIS — E44.1 MILD PROTEIN-CALORIE MALNUTRITION (HCC): Primary | ICD-10-CM

## 2023-01-19 RX ORDER — LANOLIN ALCOHOL/MO/W.PET/CERES
CREAM (GRAM) TOPICAL
Qty: 60 TABLET | Refills: 3 | Status: SHIPPED | OUTPATIENT
Start: 2023-01-19

## 2023-01-19 NOTE — PROGRESS NOTES
1. Have you been to the ER, urgent care clinic since your last visit? Hospitalized since your last visit?no    2. Have you seen or consulted any other health care providers outside of the [address] [place] [address] since your last visit? Include any pap smears or colon screening.  No    Chief Complaint   Patient presents with    Seizure    Anemia

## 2023-01-19 NOTE — PATIENT INSTRUCTIONS
Rule. Activation    Thank you for requesting access to Rule.. Please follow the instructions below to securely access and download your online medical record. Rule. allows you to send messages to your doctor, view your test results, renew your prescriptions, schedule appointments, and more. How Do I Sign Up? In your internet browser, go to [email or web address]  Click on the First Time User? Click Here link in the Sign In box. You will be redirect to the New Member Sign Up page. Enter your Rule. Access Code exactly as it appears below. You will not need to use this code after youve completed the sign-up process. If you do not sign up before the expiration date, you must request a new code. Rule. Access Code: [email or web address]  Expires: 2023 11:21 AM (This is the date your Rule. access code will )    Enter the last four digits of your Social Security Number (xxxx) and Date of Birth (mm/dd/yyyy) as indicated and click Submit. You will be taken to the next sign-up page. Create a Rule. ID. This will be your Rule. login ID and cannot be changed, so think of one that is secure and easy to remember. Create a Rule. password. You can change your password at any time. Enter your Password Reset Question and Answer. This can be used at a later time if you forget your password. Enter your e-mail address. You will receive e-mail notification when new information is available in [address] Click Sign Up. You can now view and download portions of your medical record. Click the [place] link to download a portable copy of your medical information. Additional Information    If you have questions, please visit the Frequently Asked Questions section of the Rule. website at [email or web address] Remember, Rule. is NOT to be used for urgent needs. For medical emergencies, dial 911.

## 2023-03-03 DIAGNOSIS — R56.9 SEIZURE (HCC): ICD-10-CM

## 2023-03-03 RX ORDER — LACOSAMIDE 150 MG/1
TABLET ORAL
Qty: 60 TABLET | Refills: 3 | Status: SHIPPED | OUTPATIENT
Start: 2023-03-03

## 2023-04-04 RX ORDER — LANOLIN ALCOHOL/MO/W.PET/CERES
CREAM (GRAM) TOPICAL
Qty: 180 TABLET | Refills: 1 | Status: SHIPPED
Start: 2023-04-04

## 2023-05-01 RX ORDER — TAMSULOSIN HYDROCHLORIDE 0.4 MG/1
CAPSULE ORAL
Qty: 90 CAPSULE | Refills: 1 | Status: SHIPPED | OUTPATIENT
Start: 2023-05-01

## 2023-05-22 DIAGNOSIS — R56.9 UNSPECIFIED CONVULSIONS (HCC): ICD-10-CM

## 2023-05-23 RX ORDER — BRIVARACETAM 50 MG/1
TABLET, FILM COATED ORAL
Qty: 60 TABLET | Refills: 0 | Status: SHIPPED | OUTPATIENT
Start: 2023-05-23 | End: 2023-07-23

## 2023-05-23 NOTE — TELEPHONE ENCOUNTER
Last appointment: 01/09/2023 Virtual visit MD Moon Antonio   Next appointment: 05/24/2023 MD Moon Antonio   Previous refill encounter(s):   01/09/2023 Briviact #60 with 3 refills. [facility] to [facility] Only    Program: Medication Refill  Intervention Detail: New Rx: 1, reason: Patient Preference  Time Spent (min): 5      Requested Prescriptions     Pending Prescriptions Disp Refills    BRIVIACT 50 MG tablet [Pharmacy Med Name: BRIVIACT 50 MG TABLET] 60 tablet 0     Sig: TAKE 1 TABLET BY MOUTH TWO (2) TIMES A DAY. MAX DAILY AMOUNT: 100 MG.

## 2023-07-02 DIAGNOSIS — R56.9 UNSPECIFIED CONVULSIONS (HCC): ICD-10-CM

## 2023-07-29 DIAGNOSIS — R56.9 UNSPECIFIED CONVULSIONS (HCC): ICD-10-CM

## 2023-08-05 RX ORDER — LACOSAMIDE 150 MG/1
TABLET ORAL
Qty: 60 TABLET | Refills: 3 | OUTPATIENT
Start: 2023-08-05

## 2023-08-09 RX ORDER — BRIVARACETAM 50 MG/1
TABLET, FILM COATED ORAL
Qty: 60 TABLET | Refills: 0 | OUTPATIENT
Start: 2023-08-09 | End: 2023-10-09

## 2023-08-18 DIAGNOSIS — R56.9 UNSPECIFIED CONVULSIONS (HCC): ICD-10-CM

## 2023-08-21 ENCOUNTER — TELEMEDICINE (OUTPATIENT)
Facility: CLINIC | Age: 78
End: 2023-08-21

## 2023-08-21 DIAGNOSIS — Z09 HOSPITAL DISCHARGE FOLLOW-UP: ICD-10-CM

## 2023-08-21 DIAGNOSIS — R56.9 UNSPECIFIED CONVULSIONS (HCC): ICD-10-CM

## 2023-08-21 DIAGNOSIS — D50.8 OTHER IRON DEFICIENCY ANEMIA: ICD-10-CM

## 2023-08-21 DIAGNOSIS — E55.9 VITAMIN D DEFICIENCY: ICD-10-CM

## 2023-08-21 DIAGNOSIS — R56.9 CONVULSIONS, UNSPECIFIED CONVULSION TYPE (HCC): Primary | ICD-10-CM

## 2023-08-21 PROCEDURE — 1123F ACP DISCUSS/DSCN MKR DOCD: CPT | Performed by: INTERNAL MEDICINE

## 2023-08-21 PROCEDURE — 1111F DSCHRG MED/CURRENT MED MERGE: CPT | Performed by: INTERNAL MEDICINE

## 2023-08-21 PROCEDURE — 99213 OFFICE O/P EST LOW 20 MIN: CPT | Performed by: INTERNAL MEDICINE

## 2023-08-21 RX ORDER — FERROUS SULFATE 325(65) MG
325 TABLET ORAL 2 TIMES DAILY
Qty: 30 TABLET | Refills: 3 | Status: SHIPPED | OUTPATIENT
Start: 2023-08-21

## 2023-08-21 SDOH — ECONOMIC STABILITY: HOUSING INSECURITY
IN THE LAST 12 MONTHS, WAS THERE A TIME WHEN YOU DID NOT HAVE A STEADY PLACE TO SLEEP OR SLEPT IN A SHELTER (INCLUDING NOW)?: NO

## 2023-08-21 SDOH — ECONOMIC STABILITY: FOOD INSECURITY: WITHIN THE PAST 12 MONTHS, YOU WORRIED THAT YOUR FOOD WOULD RUN OUT BEFORE YOU GOT MONEY TO BUY MORE.: NEVER TRUE

## 2023-08-21 SDOH — ECONOMIC STABILITY: FOOD INSECURITY: WITHIN THE PAST 12 MONTHS, THE FOOD YOU BOUGHT JUST DIDN'T LAST AND YOU DIDN'T HAVE MONEY TO GET MORE.: NEVER TRUE

## 2023-08-21 SDOH — ECONOMIC STABILITY: INCOME INSECURITY: HOW HARD IS IT FOR YOU TO PAY FOR THE VERY BASICS LIKE FOOD, HOUSING, MEDICAL CARE, AND HEATING?: NOT VERY HARD

## 2023-08-21 ASSESSMENT — PATIENT HEALTH QUESTIONNAIRE - PHQ9
SUM OF ALL RESPONSES TO PHQ9 QUESTIONS 1 & 2: 0
SUM OF ALL RESPONSES TO PHQ QUESTIONS 1-9: 0
DEPRESSION UNABLE TO ASSESS: FUNCTIONAL CAPACITY MOTIVATION LIMITS ACCURACY
1. LITTLE INTEREST OR PLEASURE IN DOING THINGS: 0
2. FEELING DOWN, DEPRESSED OR HOPELESS: 0
SUM OF ALL RESPONSES TO PHQ QUESTIONS 1-9: 0

## 2023-08-21 ASSESSMENT — ANXIETY QUESTIONNAIRES
4. TROUBLE RELAXING: 0
1. FEELING NERVOUS, ANXIOUS, OR ON EDGE: 0
7. FEELING AFRAID AS IF SOMETHING AWFUL MIGHT HAPPEN: 0
3. WORRYING TOO MUCH ABOUT DIFFERENT THINGS: 0
6. BECOMING EASILY ANNOYED OR IRRITABLE: 0
GAD7 TOTAL SCORE: 0
2. NOT BEING ABLE TO STOP OR CONTROL WORRYING: 0
5. BEING SO RESTLESS THAT IT IS HARD TO SIT STILL: 0

## 2023-08-21 NOTE — PROGRESS NOTES
Trice Danielle is a 78 y.o. female being evaluated by a Virtual Visit (video visit) encounter to address concerns as mentioned above. A caregiver was present when appropriate. Due to this being a TeleHealth encounter (During [place] [address] public health emergency), evaluation of the following organ systems was limited: Vitals/Constitutional/EENT/Resp/CV/GI//MS/Neuro/Skin/Heme-Lymph-Imm. Pursuant to the emergency declaration under the [address] and the [employer] Act, this Virtual Visit was conducted with patient's (and/or legal guardian's) consent, to reduce the risk of exposure to COVID-19 and provide necessary medical care. Services were provided through a video synchronous discussion virtually to substitute for in-person encounter. --Ishan Johnson MD on 8/21/2023 at 2:53 PM    An electronic signature was used to authenticate this note. Trice Danielle is a 78 y.o. female and presents with Seizures, Medication Refill, and Follow-Up from Hospital  .  Subjective:  Seizure Review:  Patient presents for evaluation of seizures. Patient has been stable without any new seizures reported. Patient has tolerated his medication thus far. Presents with less frequency of urination    Anemia  Patient presents for  evaluation of anemia. Anemia was found by routine CBC. It has been present for several months. Associated signs & symptoms: fatigue   She states the iron medication helps    Review of Systems  Constitutional: negative for fevers, chills, anorexia and weight loss  Eyes:   negative for visual disturbance and irritation  ENT:   negative for tinnitus,sore throat,nasal congestion,ear pains. hoarseness  Respiratory:  negative for cough, hemoptysis, dyspnea,wheezing  CV:   negative for chest pain, palpitations, lower extremity edema  GI:   negative for nausea, vomiting, diarrhea, abdominal

## 2023-08-21 NOTE — PROGRESS NOTES
1. Have you been to the ER, urgent care clinic since your last visit? Hospitalized since your last visit? Yes     2. Have you seen or consulted any other health care providers outside of the [address] [place] [address] since your last visit? Include any pap smears or colon screening.  no    Chief Complaint   Patient presents with    Seizures    Medication Refill         PHQ-9 Total Score: 0 (8/21/2023  2:26 PM)

## 2023-08-25 RX ORDER — LACOSAMIDE 150 MG/1
TABLET ORAL
Qty: 60 TABLET | Refills: 3 | OUTPATIENT
Start: 2023-08-25

## 2023-08-25 RX ORDER — BRIVARACETAM 50 MG/1
TABLET, FILM COATED ORAL
Qty: 60 TABLET | Refills: 0 | OUTPATIENT
Start: 2023-08-25 | End: 2023-10-25

## 2023-10-04 ENCOUNTER — APPOINTMENT (OUTPATIENT)
Facility: HOSPITAL | Age: 78
End: 2023-10-04
Payer: MEDICARE

## 2023-10-04 ENCOUNTER — HOSPITAL ENCOUNTER (EMERGENCY)
Facility: HOSPITAL | Age: 78
Discharge: HOME | End: 2023-10-04
Attending: EMERGENCY MEDICINE
Payer: MEDICARE

## 2023-10-04 VITALS
SYSTOLIC BLOOD PRESSURE: 117 MMHG | HEART RATE: 80 BPM | OXYGEN SATURATION: 99 % | DIASTOLIC BLOOD PRESSURE: 77 MMHG | TEMPERATURE: 98.5 F | RESPIRATION RATE: 18 BRPM

## 2023-10-04 DIAGNOSIS — G40.919 BREAKTHROUGH SEIZURE (HCC): Primary | ICD-10-CM

## 2023-10-04 LAB
ALBUMIN SERPL-MCNC: 3.5 G/DL (ref 3.5–5)
ALBUMIN/GLOB SERPL: 0.9 (ref 1.1–2.2)
ALP SERPL-CCNC: 75 U/L (ref 45–117)
ALT SERPL-CCNC: 11 U/L (ref 12–78)
AMPHET UR QL SCN: NEGATIVE
ANION GAP SERPL CALC-SCNC: 11 MMOL/L (ref 5–15)
APPEARANCE UR: CLEAR
AST SERPL-CCNC: 18 U/L (ref 15–37)
BACTERIA URNS QL MICRO: NEGATIVE /HPF
BARBITURATES UR QL SCN: NEGATIVE
BASOPHILS # BLD: 0 K/UL (ref 0–0.1)
BASOPHILS NFR BLD: 1 % (ref 0–1)
BENZODIAZ UR QL: NEGATIVE
BILIRUB SERPL-MCNC: 0.4 MG/DL (ref 0.2–1)
BILIRUB UR QL: NEGATIVE
BUN SERPL-MCNC: 10 MG/DL (ref 6–20)
BUN/CREAT SERPL: 12 (ref 12–20)
CALCIUM SERPL-MCNC: 9.1 MG/DL (ref 8.5–10.1)
CANNABINOIDS UR QL SCN: NEGATIVE
CHLORIDE SERPL-SCNC: 102 MMOL/L (ref 97–108)
CO2 SERPL-SCNC: 27 MMOL/L (ref 21–32)
COCAINE UR QL SCN: NEGATIVE
COLOR UR: ABNORMAL
CREAT SERPL-MCNC: 0.86 MG/DL (ref 0.55–1.02)
DIFFERENTIAL METHOD BLD: ABNORMAL
EKG ATRIAL RATE: 67 BPM
EKG DIAGNOSIS: NORMAL
EKG P AXIS: 76 DEGREES
EKG P-R INTERVAL: 136 MS
EKG Q-T INTERVAL: 396 MS
EKG QRS DURATION: 74 MS
EKG QTC CALCULATION (BAZETT): 418 MS
EKG R AXIS: -21 DEGREES
EKG T AXIS: 32 DEGREES
EKG VENTRICULAR RATE: 67 BPM
EOSINOPHIL # BLD: 0.1 K/UL (ref 0–0.4)
EOSINOPHIL NFR BLD: 1 % (ref 0–7)
EPITH CASTS URNS QL MICRO: ABNORMAL /LPF
ERYTHROCYTE [DISTWIDTH] IN BLOOD BY AUTOMATED COUNT: 14 % (ref 11.5–14.5)
ETHANOL SERPL-MCNC: <10 MG/DL (ref 0–0.08)
GLOBULIN SER CALC-MCNC: 3.7 G/DL (ref 2–4)
GLUCOSE BLD STRIP.AUTO-MCNC: 113 MG/DL (ref 65–117)
GLUCOSE SERPL-MCNC: 112 MG/DL (ref 65–100)
GLUCOSE UR STRIP.AUTO-MCNC: NEGATIVE MG/DL
HCT VFR BLD AUTO: 35.6 % (ref 35–47)
HGB BLD-MCNC: 11.1 G/DL (ref 11.5–16)
HGB UR QL STRIP: NEGATIVE
IMM GRANULOCYTES # BLD AUTO: 0 K/UL (ref 0–0.04)
IMM GRANULOCYTES NFR BLD AUTO: 0 % (ref 0–0.5)
KETONES UR QL STRIP.AUTO: NEGATIVE MG/DL
LEUKOCYTE ESTERASE UR QL STRIP.AUTO: ABNORMAL
LYMPHOCYTES # BLD: 1.6 K/UL (ref 0.8–3.5)
LYMPHOCYTES NFR BLD: 25 % (ref 12–49)
Lab: NORMAL
MCH RBC QN AUTO: 27.4 PG (ref 26–34)
MCHC RBC AUTO-ENTMCNC: 31.2 G/DL (ref 30–36.5)
MCV RBC AUTO: 87.9 FL (ref 80–99)
METHADONE UR QL: NEGATIVE
MONOCYTES # BLD: 0.5 K/UL (ref 0–1)
MONOCYTES NFR BLD: 7 % (ref 5–13)
NEUTS SEG # BLD: 4.2 K/UL (ref 1.8–8)
NEUTS SEG NFR BLD: 66 % (ref 32–75)
NITRITE UR QL STRIP.AUTO: NEGATIVE
NRBC # BLD: 0 K/UL (ref 0–0.01)
NRBC BLD-RTO: 0 PER 100 WBC
OPIATES UR QL: NEGATIVE
PCP UR QL: NEGATIVE
PH UR STRIP: 6.5 (ref 5–8)
PLATELET # BLD AUTO: 311 K/UL (ref 150–400)
PMV BLD AUTO: 9.4 FL (ref 8.9–12.9)
POTASSIUM SERPL-SCNC: 4.4 MMOL/L (ref 3.5–5.1)
PROT SERPL-MCNC: 7.2 G/DL (ref 6.4–8.2)
PROT UR STRIP-MCNC: NEGATIVE MG/DL
RBC # BLD AUTO: 4.05 M/UL (ref 3.8–5.2)
RBC #/AREA URNS HPF: ABNORMAL /HPF (ref 0–5)
SERVICE CMNT-IMP: NORMAL
SODIUM SERPL-SCNC: 140 MMOL/L (ref 136–145)
SP GR UR REFRACTOMETRY: 1.01
TROPONIN I SERPL HS-MCNC: 5 NG/L (ref 0–51)
URINE CULTURE IF INDICATED: ABNORMAL
UROBILINOGEN UR QL STRIP.AUTO: 2 EU/DL (ref 0.2–1)
WBC # BLD AUTO: 6.3 K/UL (ref 3.6–11)
WBC URNS QL MICRO: ABNORMAL /HPF (ref 0–4)

## 2023-10-04 PROCEDURE — 6360000002 HC RX W HCPCS: Performed by: EMERGENCY MEDICINE

## 2023-10-04 PROCEDURE — 71045 X-RAY EXAM CHEST 1 VIEW: CPT

## 2023-10-04 PROCEDURE — 80053 COMPREHEN METABOLIC PANEL: CPT

## 2023-10-04 PROCEDURE — 2580000003 HC RX 258: Performed by: EMERGENCY MEDICINE

## 2023-10-04 PROCEDURE — 82077 ASSAY SPEC XCP UR&BREATH IA: CPT

## 2023-10-04 PROCEDURE — 36415 COLL VENOUS BLD VENIPUNCTURE: CPT

## 2023-10-04 PROCEDURE — 96375 TX/PRO/DX INJ NEW DRUG ADDON: CPT

## 2023-10-04 PROCEDURE — 93005 ELECTROCARDIOGRAM TRACING: CPT | Performed by: EMERGENCY MEDICINE

## 2023-10-04 PROCEDURE — 93010 ELECTROCARDIOGRAM REPORT: CPT | Performed by: SPECIALIST

## 2023-10-04 PROCEDURE — 99285 EMERGENCY DEPT VISIT HI MDM: CPT

## 2023-10-04 PROCEDURE — 81001 URINALYSIS AUTO W/SCOPE: CPT

## 2023-10-04 PROCEDURE — 82962 GLUCOSE BLOOD TEST: CPT

## 2023-10-04 PROCEDURE — 80307 DRUG TEST PRSMV CHEM ANLYZR: CPT

## 2023-10-04 PROCEDURE — 96365 THER/PROPH/DIAG IV INF INIT: CPT

## 2023-10-04 PROCEDURE — C9254 INJECTION, LACOSAMIDE: HCPCS | Performed by: EMERGENCY MEDICINE

## 2023-10-04 PROCEDURE — 6370000000 HC RX 637 (ALT 250 FOR IP): Performed by: EMERGENCY MEDICINE

## 2023-10-04 PROCEDURE — 84484 ASSAY OF TROPONIN QUANT: CPT

## 2023-10-04 PROCEDURE — 85025 COMPLETE CBC W/AUTO DIFF WBC: CPT

## 2023-10-04 PROCEDURE — 96361 HYDRATE IV INFUSION ADD-ON: CPT

## 2023-10-04 PROCEDURE — 70450 CT HEAD/BRAIN W/O DYE: CPT

## 2023-10-04 RX ORDER — 0.9 % SODIUM CHLORIDE 0.9 %
1000 INTRAVENOUS SOLUTION INTRAVENOUS ONCE
Status: COMPLETED | OUTPATIENT
Start: 2023-10-04 | End: 2023-10-04

## 2023-10-04 RX ORDER — ACETAMINOPHEN 500 MG
1000 TABLET ORAL
Status: COMPLETED | OUTPATIENT
Start: 2023-10-04 | End: 2023-10-04

## 2023-10-04 RX ORDER — ONDANSETRON 2 MG/ML
4 INJECTION INTRAMUSCULAR; INTRAVENOUS EVERY 6 HOURS PRN
Status: DISCONTINUED | OUTPATIENT
Start: 2023-10-04 | End: 2023-10-04 | Stop reason: HOSPADM

## 2023-10-04 RX ADMIN — ACETAMINOPHEN 1000 MG: 500 TABLET ORAL at 10:28

## 2023-10-04 RX ADMIN — SODIUM CHLORIDE 100 MG: 9 INJECTION, SOLUTION INTRAVENOUS at 08:59

## 2023-10-04 RX ADMIN — ONDANSETRON 4 MG: 2 INJECTION INTRAMUSCULAR; INTRAVENOUS at 09:42

## 2023-10-04 RX ADMIN — SODIUM CHLORIDE 1000 ML: 9 INJECTION, SOLUTION INTRAVENOUS at 09:00

## 2023-10-04 ASSESSMENT — PAIN SCALES - GENERAL
PAINLEVEL_OUTOF10: 8
PAINLEVEL_OUTOF10: 10

## 2023-10-04 ASSESSMENT — PAIN DESCRIPTION - LOCATION
LOCATION: HEAD
LOCATION: HEAD

## 2023-10-04 ASSESSMENT — PAIN - FUNCTIONAL ASSESSMENT: PAIN_FUNCTIONAL_ASSESSMENT: 0-10

## 2023-10-04 NOTE — CARE COORDINATION
CM reviewed pt's chart. Pt presents to the ED due to a possible seizure. Pt is 78years old. CM met with pt in room in the ED and requested permission to contact her daughter, Mainor Echeverria, at [phone]. Pt provided verbal permission for CM to contact her daughter. CM called pt's daughter and confirmed that the contact information listed for pt on her facesheet is correct. CM asked Ms. Pérez if pt has any health insurance. Ms. Ko Pugh confirmed that pt has both Medicare and Medicaid. Pt's PCP is Dr. Hilary Ying. Ms. Ko Pugh told CM that pt lives with her. Ms. Ko Pugh assists pt with her ADLs at home. CM asked Ms. Pérez if she feels that pt needs any additional services in the home and she said that personal care services would be helpful for pt. Ms. Ko Pugh goes to school on Mondays and Wednesdays and is not able to assist pt on these days. CM discussed a referral to [facility]/Health Department to request a UAI assessment to be completed with pt in the community. Ms. Ko Pugh provided permission for this referral to be made for pt and for her number to be included on the referral as a contact for pt. CM also discussed a referral to [employer] for pt with Ms. Pérez. Ms. Ko Pugh is agreeable to referral on pt's behalf. CM to follow up with patient to confirm that she is agreeable to the referral as well. CM to follow for discharge needs. 1:23 PM: CM met with pt to request permission to send a referral on her behalf to [employer] and a referral on her behalf to [facility]/Health Department for a UAI assessment to be completed. Pt told CM to speak with her daughters about this as \"they manage everything. \" Pt agreeable to following her daughter's recommendation. CM sent a referral to the [employer] Care Transition Program for pt through [id number]. CM called [facility] at [phone] to request a UAI assessment screening to be completed with pt in the community.  CM left a VM and requested a call

## 2023-10-04 NOTE — ED NOTES
Discharge instructions reviewed with family over the phone. Pt placed in uber- family to meet uber at home. Pt ambulatory in room- given dc paperwork.         Sean Casillas RN  10/04/23 5152

## 2023-10-04 NOTE — ED NOTES
Attempted to toilet pt via bedpan. Pt unsuccessful- placed on purewick and new brief applied. Will reassess in 30 min.       Mari Diaz RN  10/04/23 2844

## 2023-10-04 NOTE — ED NOTES
Pharmacy called for Vimpat, states is mixing and will bring down shortly      Difficult IV sticks, attempt x2 by RN, once in [address] and RAC. Charge LOKESH Sagastume made aware     Seizure pads in place.      Baldomero Birch RN  10/04/23 9899 [address]  10/04/23 9090

## 2023-10-04 NOTE — ED PROVIDER NOTES
[facility] EMERGENCY DEPT  EMERGENCY DEPARTMENT ENCOUNTER       Pt Name: Faye Crowe  MRN: [id number]  [address] 1945  Date of evaluation: 10/4/2023  Provider: Luis Alberto Sánchez MD   PCP: Estelita Hamilton MD  Note Started: 4:34 PM 10/4/23     CHIEF COMPLAINT       Chief Complaint   Patient presents with    Seizures     HISTORY OF PRESENT ILLNESS: 1 or more elements      History From: EMS, patient, History limited by: Confusion     Faye Crowe is a 78 y.o. female who presents with seizure-like activity. Patient reportedly felt unwell this morning, had a 1 minute episode of shaking. Now reports a headache, does not answer some orientation questions, history limited by her confusion. Nursing Notes were all reviewed and agreed with or any disagreements were addressed in the HPI. REVIEW OF SYSTEMS        Positives and Pertinent negatives as per HPI. PAST HISTORY     Past Medical History:  Past Medical History:   Diagnosis Date    Anemia     Arthritis     Asthma     Headache     Seizures ([address])        Past Surgical History:  Past Surgical History:   Procedure Laterality Date    NEUROLOGICAL SURGERY         Family History:  History reviewed. No pertinent family history.     Social History:  Social History     Tobacco Use    Smoking status: Heavy Smoker     Packs/day: 0     Types: Cigarettes    Smokeless tobacco: Never   Vaping Use    Vaping Use: Never used   Substance Use Topics    Alcohol use: No    Drug use: No       Allergies:  No Known Allergies    CURRENT MEDICATIONS      Discharge Medication List as of 10/4/2023 12:51 PM        CONTINUE these medications which have NOT CHANGED    Details   Brivaracetam (BRIVIACT) 50 MG tablet Si tab po bid, Disp-60 tablet, R-3Normal      ferrous sulfate (IRON 325) 325 (65 Fe) MG tablet Take 1 tablet by mouth 2 times daily, Disp-30 tablet, R-3Normal      vitamin D (CHOLECALCIFEROL) 25 MCG (1000 UT) TABS tablet Take 1 tablet by mouth 2 times daily, Disp-30 tablet,

## 2023-11-28 RX ORDER — TAMSULOSIN HYDROCHLORIDE 0.4 MG/1
CAPSULE ORAL DAILY
Qty: 90 CAPSULE | Refills: 1 | Status: SHIPPED | OUTPATIENT
Start: 2023-11-28

## 2024-01-09 ENCOUNTER — TELEPHONE (OUTPATIENT)
Facility: CLINIC | Age: 79
End: 2024-01-09

## 2024-01-09 NOTE — TELEPHONE ENCOUNTER
Denise with [facility] called and stated that a referral was received back yesterday but, she would lke to confirm that [place] will fu and sign for services.  Denise can be reached @ [phone].

## 2024-01-10 ENCOUNTER — TELEPHONE (OUTPATIENT)
Facility: CLINIC | Age: 79
End: 2024-01-10

## 2024-01-10 NOTE — TELEPHONE ENCOUNTER
PATIENT HAS BEEN DISCHARGED FROM HOSPITAL AFTER BEING TREATED FOR CONVULSIONS.  GRACIE, WITH [facility] WOULD LIKE TO KNOW IF DR HATFIELD WILL FOLLOW [facility] FOR HOME HEALTH AND SIGN ORDERS. GRACIE CAN BE REACHED [phone].

## 2024-01-23 ENCOUNTER — OFFICE VISIT (OUTPATIENT)
Facility: CLINIC | Age: 79
End: 2024-01-23
Payer: MEDICARE

## 2024-01-23 VITALS
TEMPERATURE: 98.3 F | DIASTOLIC BLOOD PRESSURE: 60 MMHG | HEART RATE: 69 BPM | RESPIRATION RATE: 20 BRPM | SYSTOLIC BLOOD PRESSURE: 110 MMHG | OXYGEN SATURATION: 95 % | BODY MASS INDEX: 19.66 KG/M2 | HEIGHT: 65 IN | WEIGHT: 118 LBS

## 2024-01-23 DIAGNOSIS — N30.00 ACUTE CYSTITIS WITHOUT HEMATURIA: ICD-10-CM

## 2024-01-23 DIAGNOSIS — R56.9 CONVULSIONS, UNSPECIFIED CONVULSION TYPE (HCC): ICD-10-CM

## 2024-01-23 DIAGNOSIS — I48.0 PAROXYSMAL ATRIAL FIBRILLATION (HCC): ICD-10-CM

## 2024-01-23 DIAGNOSIS — Z09 HOSPITAL DISCHARGE FOLLOW-UP: ICD-10-CM

## 2024-01-23 DIAGNOSIS — E44.1 MILD PROTEIN-CALORIE MALNUTRITION (HCC): ICD-10-CM

## 2024-01-23 DIAGNOSIS — J18.9 PNEUMONIA OF RIGHT LOWER LOBE DUE TO INFECTIOUS ORGANISM: Primary | ICD-10-CM

## 2024-01-23 DIAGNOSIS — E55.9 VITAMIN D DEFICIENCY: ICD-10-CM

## 2024-01-23 DIAGNOSIS — D50.0 IRON DEFICIENCY ANEMIA SECONDARY TO BLOOD LOSS (CHRONIC): ICD-10-CM

## 2024-01-23 PROCEDURE — 99214 OFFICE O/P EST MOD 30 MIN: CPT | Performed by: INTERNAL MEDICINE

## 2024-01-23 PROCEDURE — 1111F DSCHRG MED/CURRENT MED MERGE: CPT | Performed by: INTERNAL MEDICINE

## 2024-01-23 PROCEDURE — 1123F ACP DISCUSS/DSCN MKR DOCD: CPT | Performed by: INTERNAL MEDICINE

## 2024-01-23 RX ORDER — AMOXICILLIN AND CLAVULANATE POTASSIUM 875; 125 MG/1; MG/1
1 TABLET, FILM COATED ORAL 2 TIMES DAILY
Qty: 3 TABLET | Refills: 0 | Status: SHIPPED | OUTPATIENT
Start: 2024-01-23 | End: 2024-01-25

## 2024-01-23 RX ORDER — BENZONATATE 100 MG/1
100 CAPSULE ORAL 3 TIMES DAILY PRN
Qty: 21 CAPSULE | Refills: 0 | Status: SHIPPED | OUTPATIENT
Start: 2024-01-23 | End: 2024-02-02

## 2024-01-23 SDOH — ECONOMIC STABILITY: FOOD INSECURITY: WITHIN THE PAST 12 MONTHS, THE FOOD YOU BOUGHT JUST DIDN'T LAST AND YOU DIDN'T HAVE MONEY TO GET MORE.: NEVER TRUE

## 2024-01-23 SDOH — ECONOMIC STABILITY: INCOME INSECURITY: HOW HARD IS IT FOR YOU TO PAY FOR THE VERY BASICS LIKE FOOD, HOUSING, MEDICAL CARE, AND HEATING?: SOMEWHAT HARD

## 2024-01-23 SDOH — ECONOMIC STABILITY: FOOD INSECURITY: WITHIN THE PAST 12 MONTHS, YOU WORRIED THAT YOUR FOOD WOULD RUN OUT BEFORE YOU GOT MONEY TO BUY MORE.: NEVER TRUE

## 2024-01-23 ASSESSMENT — PATIENT HEALTH QUESTIONNAIRE - PHQ9
2. FEELING DOWN, DEPRESSED OR HOPELESS: 0
SUM OF ALL RESPONSES TO PHQ QUESTIONS 1-9: 0
SUM OF ALL RESPONSES TO PHQ QUESTIONS 1-9: 0
1. LITTLE INTEREST OR PLEASURE IN DOING THINGS: 0
SUM OF ALL RESPONSES TO PHQ9 QUESTIONS 1 & 2: 0
SUM OF ALL RESPONSES TO PHQ QUESTIONS 1-9: 0
SUM OF ALL RESPONSES TO PHQ QUESTIONS 1-9: 0

## 2024-01-23 ASSESSMENT — ANXIETY QUESTIONNAIRES
IF YOU CHECKED OFF ANY PROBLEMS ON THIS QUESTIONNAIRE, HOW DIFFICULT HAVE THESE PROBLEMS MADE IT FOR YOU TO DO YOUR WORK, TAKE CARE OF THINGS AT HOME, OR GET ALONG WITH OTHER PEOPLE: NOT DIFFICULT AT ALL
3. WORRYING TOO MUCH ABOUT DIFFERENT THINGS: 0
2. NOT BEING ABLE TO STOP OR CONTROL WORRYING: 0
GAD7 TOTAL SCORE: 0
4. TROUBLE RELAXING: 0
5. BEING SO RESTLESS THAT IT IS HARD TO SIT STILL: 0
7. FEELING AFRAID AS IF SOMETHING AWFUL MIGHT HAPPEN: 0
1. FEELING NERVOUS, ANXIOUS, OR ON EDGE: 0
6. BECOMING EASILY ANNOYED OR IRRITABLE: 0

## 2024-01-23 NOTE — PROGRESS NOTES
1. Have you been to the ER, urgent care clinic since your last visit?  Hospitalized since your last visit?  YES [facility]    2. Have you seen or consulted any other health care providers outside of the [facility] since your last visit?  Include any pap smears or colon screening. YES , [facility]     Chief Complaint   Patient presents with    Follow-Up from Hospital         PHQ-9 Total Score: 0 (1/23/2024 10:37 AM)

## 2024-01-23 NOTE — PROGRESS NOTES
Sapna Pérez is a 78 y.o. female and presents with Follow-Up from Hospital  .  Subjective:    Seizure Review:  Patient presents for evaluation of seizures. Patient has been stable without any new seizures reported since leaving the hospital..Patient has tolerated her new medication thus far.    She had a recent uti as well as a pneumonia    Vitamin D Deficiency Review   The patient has a previous history of vitamin d deficiency  The patient is on medication for vitamin d deficiency  The patient has denied any recent falls or fractures    She has a history of a atrial fib and is on Eliquis.    Her daughter states her appetite has been decreased.    Her has been on Flomax with good control     Anemia  Patient presents for  evaluation of anemia. Anemia was found by routine CBC.  It has been present for several months.  Associated signs & symptoms: fatigue        Review of Systems  Constitutional: negative for fevers, chills, anorexia and weight loss  Eyes:   negative for visual disturbance and irritation  ENT:   negative for tinnitus,sore throat,nasal congestion,ear pains.hoarseness  Respiratory:  negative for cough, hemoptysis, dyspnea,wheezing  CV:   negative for chest pain, palpitations, lower extremity edema  GI:   negative for nausea, vomiting, diarrhea, abdominal pain,melena  Endo:               negative for polyuria,polydipsia,polyphagia,heat intolerance  Genitourinary: negative for frequency, dysuria and hematuria  Integument:  negative for rash and pruritus  Hematologic:  negative for easy bruising and gum/nose bleeding  Musculoskel: negative for myalgias, arthralgias, back pain, muscle weakness, joint pain  Neurological:  negative for headaches, dizziness, vertigo, memory problems and gait   Behavl/Psych: negative for feelings of anxiety, depression, mood changes    Past Medical History:   Diagnosis Date    Anemia     Arthritis     Asthma     Headache     Seizures (HCC)      Past Surgical History:

## 2024-04-29 RX ORDER — FERROUS SULFATE 325(65) MG
1 TABLET ORAL 2 TIMES DAILY
Qty: 180 TABLET | Refills: 1 | Status: SHIPPED | OUTPATIENT
Start: 2024-04-29

## 2024-05-13 ENCOUNTER — TELEPHONE (OUTPATIENT)
Facility: CLINIC | Age: 79
End: 2024-05-13

## 2024-05-13 NOTE — TELEPHONE ENCOUNTER
RACHEL WITH [facility] CALLED TO CONFIRM THAT DR HATFIELD WILL FOLLOW & SIGN HOME CARE ORDERS UPON DISCHARGE.

## 2024-06-20 RX ORDER — LACOSAMIDE 150 MG/1
TABLET ORAL
Qty: 60 TABLET | OUTPATIENT
Start: 2024-06-20

## 2024-06-20 RX ORDER — TAMSULOSIN HYDROCHLORIDE 0.4 MG/1
CAPSULE ORAL DAILY
Qty: 90 CAPSULE | Refills: 1 | Status: SHIPPED | OUTPATIENT
Start: 2024-06-20

## 2024-07-23 RX ORDER — MELATONIN
1 2 TIMES DAILY
Qty: 180 TABLET | Refills: 2 | Status: SHIPPED | OUTPATIENT
Start: 2024-07-23

## 2024-09-09 DIAGNOSIS — R56.9 CONVULSIONS, UNSPECIFIED CONVULSION TYPE (HCC): ICD-10-CM

## 2024-11-20 ENCOUNTER — APPOINTMENT (OUTPATIENT)
Facility: HOSPITAL | Age: 79
DRG: 101 | End: 2024-11-20
Payer: MEDICARE

## 2024-11-20 ENCOUNTER — HOSPITAL ENCOUNTER (INPATIENT)
Facility: HOSPITAL | Age: 79
LOS: 2 days | Discharge: HOME | DRG: 101 | End: 2024-11-22
Attending: STUDENT IN AN ORGANIZED HEALTH CARE EDUCATION/TRAINING PROGRAM | Admitting: STUDENT IN AN ORGANIZED HEALTH CARE EDUCATION/TRAINING PROGRAM
Payer: MEDICARE

## 2024-11-20 DIAGNOSIS — G40.919 BREAKTHROUGH SEIZURE (HCC): Primary | ICD-10-CM

## 2024-11-20 DIAGNOSIS — R10.13 ABDOMINAL PAIN, EPIGASTRIC: ICD-10-CM

## 2024-11-20 DIAGNOSIS — R56.9 SEIZURE (HCC): ICD-10-CM

## 2024-11-20 LAB
ALBUMIN SERPL-MCNC: 3.3 G/DL (ref 3.5–5)
ALBUMIN/GLOB SERPL: 0.9 (ref 1.1–2.2)
ALP SERPL-CCNC: 81 U/L (ref 45–117)
ALT SERPL-CCNC: 10 U/L (ref 12–78)
ANION GAP SERPL CALC-SCNC: 2 MMOL/L (ref 2–12)
AST SERPL-CCNC: 8 U/L (ref 15–37)
BASOPHILS # BLD: 0 K/UL (ref 0–0.1)
BASOPHILS NFR BLD: 0 % (ref 0–1)
BILIRUB SERPL-MCNC: 0.5 MG/DL (ref 0.2–1)
BUN SERPL-MCNC: 7 MG/DL (ref 6–20)
BUN/CREAT SERPL: 9 (ref 12–20)
CALCIUM SERPL-MCNC: 9.6 MG/DL (ref 8.5–10.1)
CHLORIDE SERPL-SCNC: 104 MMOL/L (ref 97–108)
CO2 SERPL-SCNC: 32 MMOL/L (ref 21–32)
CREAT SERPL-MCNC: 0.8 MG/DL (ref 0.55–1.02)
D DIMER PPP FEU-MCNC: 3.13 MG/L FEU (ref 0–0.65)
DIFFERENTIAL METHOD BLD: ABNORMAL
EOSINOPHIL # BLD: 0.1 K/UL (ref 0–0.4)
EOSINOPHIL NFR BLD: 1 % (ref 0–7)
ERYTHROCYTE [DISTWIDTH] IN BLOOD BY AUTOMATED COUNT: 13.7 % (ref 11.5–14.5)
GLOBULIN SER CALC-MCNC: 3.8 G/DL (ref 2–4)
GLUCOSE SERPL-MCNC: 101 MG/DL (ref 65–100)
HCT VFR BLD AUTO: 35.7 % (ref 35–47)
HGB BLD-MCNC: 11.7 G/DL (ref 11.5–16)
IMM GRANULOCYTES # BLD AUTO: 0 K/UL (ref 0–0.04)
IMM GRANULOCYTES NFR BLD AUTO: 0 % (ref 0–0.5)
LACTATE BLD-SCNC: 0.97 MMOL/L (ref 0.4–2)
LYMPHOCYTES # BLD: 1.7 K/UL (ref 0.8–3.5)
LYMPHOCYTES NFR BLD: 23 % (ref 12–49)
MCH RBC QN AUTO: 28.8 PG (ref 26–34)
MCHC RBC AUTO-ENTMCNC: 32.8 G/DL (ref 30–36.5)
MCV RBC AUTO: 87.9 FL (ref 80–99)
MONOCYTES # BLD: 0.4 K/UL (ref 0–1)
MONOCYTES NFR BLD: 6 % (ref 5–13)
NEUTS SEG # BLD: 4.9 K/UL (ref 1.8–8)
NEUTS SEG NFR BLD: 70 % (ref 32–75)
NRBC # BLD: 0 K/UL (ref 0–0.01)
NRBC BLD-RTO: 0 PER 100 WBC
NT PRO BNP: 172 PG/ML
PLATELET # BLD AUTO: 257 K/UL (ref 150–400)
PMV BLD AUTO: 8.6 FL (ref 8.9–12.9)
POTASSIUM SERPL-SCNC: 3.4 MMOL/L (ref 3.5–5.1)
PROT SERPL-MCNC: 7.1 G/DL (ref 6.4–8.2)
RBC # BLD AUTO: 4.06 M/UL (ref 3.8–5.2)
SODIUM SERPL-SCNC: 138 MMOL/L (ref 136–145)
TROPONIN I SERPL HS-MCNC: 4 NG/L (ref 0–51)
WBC # BLD AUTO: 7.1 K/UL (ref 3.6–11)

## 2024-11-20 PROCEDURE — 6360000002 HC RX W HCPCS: Performed by: STUDENT IN AN ORGANIZED HEALTH CARE EDUCATION/TRAINING PROGRAM

## 2024-11-20 PROCEDURE — 6370000000 HC RX 637 (ALT 250 FOR IP): Performed by: NURSE PRACTITIONER

## 2024-11-20 PROCEDURE — 83605 ASSAY OF LACTIC ACID: CPT

## 2024-11-20 PROCEDURE — 80235 DRUG ASSAY LACOSAMIDE: CPT

## 2024-11-20 PROCEDURE — 85379 FIBRIN DEGRADATION QUANT: CPT

## 2024-11-20 PROCEDURE — 71045 X-RAY EXAM CHEST 1 VIEW: CPT

## 2024-11-20 PROCEDURE — 95706 EEG WO VID 2-12HR INTMT MNTR: CPT

## 2024-11-20 PROCEDURE — 99285 EMERGENCY DEPT VISIT HI MDM: CPT

## 2024-11-20 PROCEDURE — 2580000003 HC RX 258: Performed by: STUDENT IN AN ORGANIZED HEALTH CARE EDUCATION/TRAINING PROGRAM

## 2024-11-20 PROCEDURE — 74174 CTA ABD&PLVS W/CONTRAST: CPT

## 2024-11-20 PROCEDURE — C9254 INJECTION, LACOSAMIDE: HCPCS | Performed by: STUDENT IN AN ORGANIZED HEALTH CARE EDUCATION/TRAINING PROGRAM

## 2024-11-20 PROCEDURE — 36415 COLL VENOUS BLD VENIPUNCTURE: CPT

## 2024-11-20 PROCEDURE — 85025 COMPLETE CBC W/AUTO DIFF WBC: CPT

## 2024-11-20 PROCEDURE — 93005 ELECTROCARDIOGRAM TRACING: CPT | Performed by: STUDENT IN AN ORGANIZED HEALTH CARE EDUCATION/TRAINING PROGRAM

## 2024-11-20 PROCEDURE — 1100000000 HC RM PRIVATE

## 2024-11-20 PROCEDURE — 80053 COMPREHEN METABOLIC PANEL: CPT

## 2024-11-20 PROCEDURE — 96374 THER/PROPH/DIAG INJ IV PUSH: CPT

## 2024-11-20 PROCEDURE — 6360000004 HC RX CONTRAST MEDICATION: Performed by: STUDENT IN AN ORGANIZED HEALTH CARE EDUCATION/TRAINING PROGRAM

## 2024-11-20 PROCEDURE — 83880 ASSAY OF NATRIURETIC PEPTIDE: CPT

## 2024-11-20 PROCEDURE — 96375 TX/PRO/DX INJ NEW DRUG ADDON: CPT

## 2024-11-20 PROCEDURE — 84484 ASSAY OF TROPONIN QUANT: CPT

## 2024-11-20 PROCEDURE — 70450 CT HEAD/BRAIN W/O DYE: CPT

## 2024-11-20 RX ORDER — LORAZEPAM 2 MG/ML
1 INJECTION INTRAMUSCULAR ONCE
Status: COMPLETED | OUTPATIENT
Start: 2024-11-20 | End: 2024-11-20

## 2024-11-20 RX ORDER — LORAZEPAM 2 MG/ML
INJECTION INTRAMUSCULAR
Status: DISCONTINUED
Start: 2024-11-20 | End: 2024-11-20

## 2024-11-20 RX ORDER — POTASSIUM CHLORIDE 7.45 MG/ML
10 INJECTION INTRAVENOUS PRN
Status: DISCONTINUED | OUTPATIENT
Start: 2024-11-20 | End: 2024-11-22 | Stop reason: HOSPADM

## 2024-11-20 RX ORDER — ENOXAPARIN SODIUM 100 MG/ML
40 INJECTION SUBCUTANEOUS DAILY
Status: DISCONTINUED | OUTPATIENT
Start: 2024-11-21 | End: 2024-11-21

## 2024-11-20 RX ORDER — SODIUM CHLORIDE 0.9 % (FLUSH) 0.9 %
5-40 SYRINGE (ML) INJECTION PRN
Status: DISCONTINUED | OUTPATIENT
Start: 2024-11-20 | End: 2024-11-22 | Stop reason: HOSPADM

## 2024-11-20 RX ORDER — ONDANSETRON 4 MG/1
4 TABLET, ORALLY DISINTEGRATING ORAL EVERY 8 HOURS PRN
Status: DISCONTINUED | OUTPATIENT
Start: 2024-11-20 | End: 2024-11-22 | Stop reason: HOSPADM

## 2024-11-20 RX ORDER — POLYETHYLENE GLYCOL 3350 17 G/17G
17 POWDER, FOR SOLUTION ORAL DAILY PRN
Status: DISCONTINUED | OUTPATIENT
Start: 2024-11-20 | End: 2024-11-22 | Stop reason: HOSPADM

## 2024-11-20 RX ORDER — FERROUS SULFATE 325(65) MG
325 TABLET ORAL 2 TIMES DAILY
Status: DISCONTINUED | OUTPATIENT
Start: 2024-11-20 | End: 2024-11-22 | Stop reason: HOSPADM

## 2024-11-20 RX ORDER — MAGNESIUM SULFATE IN WATER 40 MG/ML
2000 INJECTION, SOLUTION INTRAVENOUS PRN
Status: DISCONTINUED | OUTPATIENT
Start: 2024-11-20 | End: 2024-11-22 | Stop reason: HOSPADM

## 2024-11-20 RX ORDER — POTASSIUM CHLORIDE 1500 MG/1
40 TABLET, EXTENDED RELEASE ORAL PRN
Status: DISCONTINUED | OUTPATIENT
Start: 2024-11-20 | End: 2024-11-22 | Stop reason: HOSPADM

## 2024-11-20 RX ORDER — IOPAMIDOL 755 MG/ML
100 INJECTION, SOLUTION INTRAVASCULAR
Status: COMPLETED | OUTPATIENT
Start: 2024-11-20 | End: 2024-11-20

## 2024-11-20 RX ORDER — ACETAMINOPHEN 325 MG/1
650 TABLET ORAL EVERY 6 HOURS PRN
Status: DISCONTINUED | OUTPATIENT
Start: 2024-11-20 | End: 2024-11-22 | Stop reason: HOSPADM

## 2024-11-20 RX ORDER — SODIUM CHLORIDE 0.9 % (FLUSH) 0.9 %
5-40 SYRINGE (ML) INJECTION EVERY 12 HOURS SCHEDULED
Status: DISCONTINUED | OUTPATIENT
Start: 2024-11-20 | End: 2024-11-22 | Stop reason: HOSPADM

## 2024-11-20 RX ORDER — ONDANSETRON 2 MG/ML
4 INJECTION INTRAMUSCULAR; INTRAVENOUS EVERY 6 HOURS PRN
Status: DISCONTINUED | OUTPATIENT
Start: 2024-11-20 | End: 2024-11-22 | Stop reason: HOSPADM

## 2024-11-20 RX ORDER — VITAMIN B COMPLEX
1000 TABLET ORAL DAILY
Status: DISCONTINUED | OUTPATIENT
Start: 2024-11-21 | End: 2024-11-22 | Stop reason: HOSPADM

## 2024-11-20 RX ORDER — SODIUM CHLORIDE 9 MG/ML
INJECTION, SOLUTION INTRAVENOUS PRN
Status: DISCONTINUED | OUTPATIENT
Start: 2024-11-20 | End: 2024-11-22 | Stop reason: HOSPADM

## 2024-11-20 RX ORDER — LORAZEPAM 2 MG/ML
2 INJECTION INTRAMUSCULAR ONCE
Status: DISCONTINUED | OUTPATIENT
Start: 2024-11-20 | End: 2024-11-22 | Stop reason: HOSPADM

## 2024-11-20 RX ORDER — LACOSAMIDE 10 MG/ML
50 INJECTION, SOLUTION INTRAVENOUS ONCE
Status: COMPLETED | OUTPATIENT
Start: 2024-11-20 | End: 2024-11-20

## 2024-11-20 RX ORDER — LACOSAMIDE 10 MG/ML
150 INJECTION, SOLUTION INTRAVENOUS 2 TIMES DAILY
Status: DISCONTINUED | OUTPATIENT
Start: 2024-11-20 | End: 2024-11-22 | Stop reason: HOSPADM

## 2024-11-20 RX ORDER — LORAZEPAM 2 MG/ML
4 INJECTION INTRAMUSCULAR EVERY 5 MIN PRN
Status: DISCONTINUED | OUTPATIENT
Start: 2024-11-20 | End: 2024-11-22 | Stop reason: HOSPADM

## 2024-11-20 RX ORDER — ACETAMINOPHEN 650 MG/1
650 SUPPOSITORY RECTAL EVERY 6 HOURS PRN
Status: DISCONTINUED | OUTPATIENT
Start: 2024-11-20 | End: 2024-11-22 | Stop reason: HOSPADM

## 2024-11-20 RX ORDER — LEVETIRACETAM 500 MG/5ML
1000 INJECTION, SOLUTION, CONCENTRATE INTRAVENOUS ONCE
Status: COMPLETED | OUTPATIENT
Start: 2024-11-20 | End: 2024-11-20

## 2024-11-20 RX ORDER — THIAMINE HYDROCHLORIDE 100 MG/ML
100 INJECTION, SOLUTION INTRAMUSCULAR; INTRAVENOUS DAILY
Status: DISCONTINUED | OUTPATIENT
Start: 2024-11-21 | End: 2024-11-22 | Stop reason: HOSPADM

## 2024-11-20 RX ADMIN — LEVETIRACETAM 1000 MG: 100 INJECTION INTRAVENOUS at 18:15

## 2024-11-20 RX ADMIN — LORAZEPAM 1 MG: 2 INJECTION INTRAMUSCULAR; INTRAVENOUS at 21:24

## 2024-11-20 RX ADMIN — LACOSAMIDE 150 MG: 10 INJECTION INTRAVENOUS at 22:22

## 2024-11-20 RX ADMIN — IOPAMIDOL 100 ML: 755 INJECTION, SOLUTION INTRAVENOUS at 19:22

## 2024-11-20 RX ADMIN — FERROUS SULFATE TAB 325 MG (65 MG ELEMENTAL FE) 325 MG: 325 (65 FE) TAB at 22:22

## 2024-11-20 RX ADMIN — LACOSAMIDE 50 MG: 10 INJECTION INTRAVENOUS at 22:21

## 2024-11-20 RX ADMIN — PANTOPRAZOLE SODIUM 40 MG: 40 INJECTION, POWDER, FOR SOLUTION INTRAVENOUS at 18:15

## 2024-11-20 ASSESSMENT — PAIN - FUNCTIONAL ASSESSMENT: PAIN_FUNCTIONAL_ASSESSMENT: 0-10

## 2024-11-20 ASSESSMENT — PAIN SCALES - GENERAL: PAINLEVEL_OUTOF10: 10

## 2024-11-20 ASSESSMENT — PAIN DESCRIPTION - LOCATION: LOCATION: CHEST

## 2024-11-20 NOTE — ED NOTES
Pt's daughter came out of room and stated pt was having a seizure. Upon entering, this RN witnessed pt shaking. Pt was able to answer questions and was instructing RN to move HOB up and down, stating she had to spit. RN called MD to bedside for eval. Pt's daughter stated \" I know what her seizures look like and she's having on\". Pt's vitals remained stable and pt remained alert through the 30 second shaking activity.

## 2024-11-21 PROBLEM — R41.82 ACUTE ALTERATION IN MENTAL STATUS: Status: ACTIVE | Noted: 2024-11-21

## 2024-11-21 LAB
25(OH)D3 SERPL-MCNC: 24.3 NG/ML (ref 30–100)
ALBUMIN SERPL-MCNC: 3.2 G/DL (ref 3.5–5)
ALBUMIN/GLOB SERPL: 0.9 (ref 1.1–2.2)
ALP SERPL-CCNC: 78 U/L (ref 45–117)
ALT SERPL-CCNC: 10 U/L (ref 12–78)
ANION GAP SERPL CALC-SCNC: 2 MMOL/L (ref 2–12)
AST SERPL-CCNC: 9 U/L (ref 15–37)
BASOPHILS # BLD: 0 K/UL (ref 0–0.1)
BASOPHILS NFR BLD: 1 % (ref 0–1)
BILIRUB SERPL-MCNC: 0.7 MG/DL (ref 0.2–1)
BUN SERPL-MCNC: 7 MG/DL (ref 6–20)
BUN/CREAT SERPL: 9 (ref 12–20)
CALCIUM SERPL-MCNC: 9.6 MG/DL (ref 8.5–10.1)
CHLORIDE SERPL-SCNC: 107 MMOL/L (ref 97–108)
CO2 SERPL-SCNC: 31 MMOL/L (ref 21–32)
CREAT SERPL-MCNC: 0.8 MG/DL (ref 0.55–1.02)
DIFFERENTIAL METHOD BLD: NORMAL
EKG ATRIAL RATE: 76 BPM
EKG DIAGNOSIS: NORMAL
EKG P AXIS: 54 DEGREES
EKG P-R INTERVAL: 124 MS
EKG Q-T INTERVAL: 396 MS
EKG QRS DURATION: 78 MS
EKG QTC CALCULATION (BAZETT): 445 MS
EKG R AXIS: -26 DEGREES
EKG T AXIS: 27 DEGREES
EKG VENTRICULAR RATE: 76 BPM
EOSINOPHIL # BLD: 0.2 K/UL (ref 0–0.4)
EOSINOPHIL NFR BLD: 3 % (ref 0–7)
ERYTHROCYTE [DISTWIDTH] IN BLOOD BY AUTOMATED COUNT: 13.9 % (ref 11.5–14.5)
GLOBULIN SER CALC-MCNC: 3.6 G/DL (ref 2–4)
GLUCOSE SERPL-MCNC: 92 MG/DL (ref 65–100)
HCT VFR BLD AUTO: 36.9 % (ref 35–47)
HGB BLD-MCNC: 11.7 G/DL (ref 11.5–16)
IMM GRANULOCYTES # BLD AUTO: 0 K/UL (ref 0–0.04)
IMM GRANULOCYTES NFR BLD AUTO: 0 % (ref 0–0.5)
LYMPHOCYTES # BLD: 1.8 K/UL (ref 0.8–3.5)
LYMPHOCYTES NFR BLD: 28 % (ref 12–49)
MCH RBC QN AUTO: 27.9 PG (ref 26–34)
MCHC RBC AUTO-ENTMCNC: 31.7 G/DL (ref 30–36.5)
MCV RBC AUTO: 88.1 FL (ref 80–99)
MONOCYTES # BLD: 0.5 K/UL (ref 0–1)
MONOCYTES NFR BLD: 8 % (ref 5–13)
NEUTS SEG # BLD: 3.9 K/UL (ref 1.8–8)
NEUTS SEG NFR BLD: 61 % (ref 32–75)
NRBC # BLD: 0 K/UL (ref 0–0.01)
NRBC BLD-RTO: 0 PER 100 WBC
PLATELET # BLD AUTO: 252 K/UL (ref 150–400)
PMV BLD AUTO: 9.1 FL (ref 8.9–12.9)
POTASSIUM SERPL-SCNC: 3.5 MMOL/L (ref 3.5–5.1)
PROT SERPL-MCNC: 6.8 G/DL (ref 6.4–8.2)
RBC # BLD AUTO: 4.19 M/UL (ref 3.8–5.2)
SODIUM SERPL-SCNC: 140 MMOL/L (ref 136–145)
T4 FREE SERPL-MCNC: 1.2 NG/DL (ref 0.8–1.5)
TSH SERPL DL<=0.05 MIU/L-ACNC: 0.83 UIU/ML (ref 0.36–3.74)
VIT B12 SERPL-MCNC: 507 PG/ML (ref 193–986)
WBC # BLD AUTO: 6.4 K/UL (ref 3.6–11)

## 2024-11-21 PROCEDURE — 84443 ASSAY THYROID STIM HORMONE: CPT

## 2024-11-21 PROCEDURE — 1100000003 HC PRIVATE W/ TELEMETRY

## 2024-11-21 PROCEDURE — 6360000002 HC RX W HCPCS: Performed by: STUDENT IN AN ORGANIZED HEALTH CARE EDUCATION/TRAINING PROGRAM

## 2024-11-21 PROCEDURE — 80053 COMPREHEN METABOLIC PANEL: CPT

## 2024-11-21 PROCEDURE — 6360000002 HC RX W HCPCS: Performed by: NURSE PRACTITIONER

## 2024-11-21 PROCEDURE — 82306 VITAMIN D 25 HYDROXY: CPT

## 2024-11-21 PROCEDURE — XX20X89 MONITORING OF BRAIN ELECTRICAL ACTIVITY, COMPUTER-AIDED DETECTION AND NOTIFICATION, NEW TECHNOLOGY GROUP 9: ICD-10-PCS | Performed by: PSYCHIATRY & NEUROLOGY

## 2024-11-21 PROCEDURE — C9254 INJECTION, LACOSAMIDE: HCPCS | Performed by: STUDENT IN AN ORGANIZED HEALTH CARE EDUCATION/TRAINING PROGRAM

## 2024-11-21 PROCEDURE — 99221 1ST HOSP IP/OBS SF/LOW 40: CPT | Performed by: PSYCHIATRY & NEUROLOGY

## 2024-11-21 PROCEDURE — 82607 VITAMIN B-12: CPT

## 2024-11-21 PROCEDURE — 6370000000 HC RX 637 (ALT 250 FOR IP): Performed by: INTERNAL MEDICINE

## 2024-11-21 PROCEDURE — 84439 ASSAY OF FREE THYROXINE: CPT

## 2024-11-21 PROCEDURE — 6370000000 HC RX 637 (ALT 250 FOR IP): Performed by: NURSE PRACTITIONER

## 2024-11-21 PROCEDURE — 85025 COMPLETE CBC W/AUTO DIFF WBC: CPT

## 2024-11-21 PROCEDURE — 2580000003 HC RX 258: Performed by: NURSE PRACTITIONER

## 2024-11-21 PROCEDURE — 36415 COLL VENOUS BLD VENIPUNCTURE: CPT

## 2024-11-21 RX ADMIN — SODIUM CHLORIDE, PRESERVATIVE FREE 10 ML: 5 INJECTION INTRAVENOUS at 09:32

## 2024-11-21 RX ADMIN — SODIUM CHLORIDE, PRESERVATIVE FREE 10 ML: 5 INJECTION INTRAVENOUS at 00:25

## 2024-11-21 RX ADMIN — ENOXAPARIN SODIUM 40 MG: 100 INJECTION SUBCUTANEOUS at 09:32

## 2024-11-21 RX ADMIN — THIAMINE HYDROCHLORIDE 100 MG: 100 INJECTION, SOLUTION INTRAMUSCULAR; INTRAVENOUS at 09:31

## 2024-11-21 RX ADMIN — BRIVARACETAM 100 MG: 25 TABLET, FILM COATED ORAL at 09:32

## 2024-11-21 RX ADMIN — FERROUS SULFATE TAB 325 MG (65 MG ELEMENTAL FE) 325 MG: 325 (65 FE) TAB at 20:59

## 2024-11-21 RX ADMIN — BRIVARACETAM 125 MG: 50 TABLET, FILM COATED ORAL at 20:58

## 2024-11-21 RX ADMIN — SODIUM CHLORIDE, PRESERVATIVE FREE 10 ML: 5 INJECTION INTRAVENOUS at 20:59

## 2024-11-21 RX ADMIN — LACOSAMIDE 150 MG: 10 INJECTION INTRAVENOUS at 20:58

## 2024-11-21 RX ADMIN — APIXABAN 2.5 MG: 2.5 TABLET, FILM COATED ORAL at 20:58

## 2024-11-21 RX ADMIN — FERROUS SULFATE TAB 325 MG (65 MG ELEMENTAL FE) 325 MG: 325 (65 FE) TAB at 09:32

## 2024-11-21 RX ADMIN — Medication 1000 UNITS: at 09:32

## 2024-11-21 RX ADMIN — LACOSAMIDE 150 MG: 10 INJECTION INTRAVENOUS at 09:31

## 2024-11-21 ASSESSMENT — PAIN SCALES - GENERAL
PAINLEVEL_OUTOF10: 0

## 2024-11-21 NOTE — PLAN OF CARE
Problem: Discharge Planning  Goal: Discharge to home or other facility with appropriate resources  Outcome: Progressing     Problem: Skin/Tissue Integrity  Goal: Absence of new skin breakdown  Description: 1.  Monitor for areas of redness and/or skin breakdown  2.  Assess vascular access sites hourly  3.  Every 4-6 hours minimum:  Change oxygen saturation probe site  4.  Every 4-6 hours:  If on nasal continuous positive airway pressure, respiratory therapy assess nares and determine need for appliance change or resting period.  Outcome: Progressing     Problem: Safety - Adult  Goal: Free from fall injury  Outcome: Progressing  Flowsheets (Taken 11/21/2024 0005)  Free From Fall Injury: Instruct family/caregiver on patient safety     Problem: Pain  Goal: Verbalizes/displays adequate comfort level or baseline comfort level  Outcome: Progressing

## 2024-11-21 NOTE — PLAN OF CARE
Problem: Safety - Adult  Goal: Free from fall injury  Flowsheets (Taken 11/21/2024 9979)  Free From Fall Injury:   Instruct family/caregiver on patient safety   Based on caregiver fall risk screen, instruct family/caregiver to ask for assistance with transferring infant if caregiver noted to have fall risk factors

## 2024-11-21 NOTE — H&P
to Admission medications    Medication Sig Start Date End Date Taking? Authorizing Provider   Brivaracetam (BRIVIACT) 100 MG TABS tablet TAKE 1 TABLET BY MOUTH TWICE A DAY 9/10/24 10/10/24  Kofi Gore Jr., MD   vitamin D3 (CHOLECALCIFEROL) 25 MCG (1000 UT) TABS tablet TAKE 1 TABLET BY MOUTH TWICE A DAY 24   Kofi Gore Jr., MD   tamsulosin (FLOMAX) 0.4 MG capsule TAKE 1 CAPSULE BY MOUTH EVERY DAY 24   Kofi Gore Jr., MD   ferrous sulfate (IRON 325) 325 (65 Fe) MG tablet TAKE 1 TABLET BY MOUTH TWICE A DAY 24   Kofi Gore Jr., MD   Brivaracetam (BRIVIACT) 50 MG tablet Si tab po bid 23  Kofi Gore Jr., MD   acetaminophen (TYLENOL) 325 MG tablet TAKE 2 TABLETS BY MOUTH EVERY SIX (6) HOURS AS NEEDED FOR PAIN. 22   Automatic Reconciliation, Ar   apixaban (ELIQUIS) 5 MG TABS tablet Take 1 tablet by mouth 2 times daily 1/3/23   Automatic Reconciliation, Ar   Ascorbic Acid 500 MG CHEW Take 1 tablet by mouth 2 times daily  Patient not taking: Reported on 2024   Automatic Reconciliation, Ar   lacosamide (VIMPAT) 150 MG TABS tablet TAKE 1 TABLET BY MOUTH 2 TIMES A DAY. MAX DAILY AMOUNT: 300 MG. 3/3/23   Automatic Reconciliation, Ar         Objective:   VITALS:    Patient Vitals for the past 24 hrs:   BP Temp Temp src Pulse Resp SpO2 Height Weight   24 123/73 -- -- 63 17 97 % -- --   24 127/80 -- -- 72 14 98 % -- --   24 128/84 -- -- 70 15 99 % -- --   24 123/79 -- -- 76 25 100 % -- --   24 123/88 -- -- 74 -- -- -- --   24 125/79 -- -- 74 15 91 % -- --   24 1815 133/87 -- -- 64 11 100 % -- --   24 1745 138/82 -- -- 66 16 96 % -- --   24 1730 129/84 -- -- 67 20 99 % -- --   24 1715 132/82 -- -- 83 16 100 % -- --   24 1700 117/83 -- -- 77 19 94 % -- --   24 1645 133/83 -- -- 76 16 100 % -- --   24 1643 125/76 98.2 °F (36.8 °C) Oral 65 12 99 % 1.651 m

## 2024-11-21 NOTE — ED NOTES
TRANSFER - OUT REPORT:    Verbal report given to LOKESH Caldwell on Sapna Pérez  being transferred to [facility] for routine progression of patient care       Report consisted of patient's Situation, Background, Assessment and   Recommendations(SBAR).     Information from the following report(s) Nurse Handoff Report, ED SBAR, Adult Overview, MAR, Recent Results, and Neuro Assessment was reviewed with the receiving nurse.    [place] Fall Assessment:    Presents to emergency department  because of falls (Syncope, seizure, or loss of consciousness): No  Age > 70: Yes  Altered Mental Status, Intoxication with alcohol or substance confusion (Disorientation, impaired judgment, poor safety awaremess, or inability to follow instructions): No  Impaired Mobility: Ambulates or transfers with assistive devices or assistance; Unable to ambulate or transer.: Yes  Nursing Judgement: Yes          Lines:   Peripheral IV 11/20/24 Left;Proximal Forearm (Active)        Opportunity for questions and clarification was provided.

## 2024-11-21 NOTE — PROCEDURES
craniotomy defect)  Dysrhythmia grade 2 left hemisphere maximum temporal  Dysrhythmia grade 1 generalized  Correlation with a standard 16 channel EEG recording with possibly be of further diagnostic benefit in this patient.    Comment: A normal EEG does not rule out the diagnosis of a seizure disorder; clinical  correlation is advised.  If there is still persistent suspicion for continued seizure-like  activity, would advise obtaining an electroencephalogram with the 10-20 international  system for improved spatial resolution and parasagittal coverage.

## 2024-11-21 NOTE — ED NOTES
Headband: applied  Date/Time: 11/20/24 2042  Recorder: recording started  Skin: intact  Highest Seizure [place] Percentage past hour: 0%      General info regarding Seizure [place] %:  Minimum duration of study is 2 hours. If Seizure [place] has remained 0% throughout the entire 2-hour duration, communicate with provider to stop the recording.     Seizure [place] 0-10% - Continue to monitor and complete 2-hour study.  Seizure [place] 11-89% - Epileptiform activity present. Notify provider for next steps.  Seizure [place] >/= 90% - Epileptiform activity consistent with Status Epilepticus. Immediately notify provider.     *Patients with Seizure [place] above 10% that persists may require a study longer than 2 hours. Maximum recording duration is 24 hours. Please update provider with a persistent increase in Seizure [place] above 10%.

## 2024-11-21 NOTE — ED PROVIDER NOTES
[facility] EMERGENCY DEPARTMENT  EMERGENCY DEPARTMENT ENCOUNTER       Pt Name: Sapna Pérez  MRN: [id number]  Birthdate 1945  Date of evaluation: 11/20/2024  Provider: Soto Jackson MD   PCP: Kofi Gore Jr., MD  Note Started: 7:05 AM EST 11/21/24     CHIEF COMPLAINT       Chief Complaint   Patient presents with    Chest Pain     Pt arrives to ED via EMS. EMS reports that pt had sudden onset of chest pain and abdominal pain around 3:45pm. EMS reports that the pain is intermittent. Hx of MI- 5 years ago and seizures.      HISTORY OF PRESENT ILLNESS: 1 or more elements      History From: patient, History limited by: none     Sapna Pérez is a 79 y.o. female w hx of epilepsy who presents to the ED with acute chest pain.  She is unable to tell me much of her history.  EMS reports that pain has been intermittent.    Please See MDM for Additional Details of the HPI/PMH  Nursing Notes were all reviewed and agreed with or any disagreements were addressed in the HPI.     REVIEW OF SYSTEMS        Positives and Pertinent negatives as per HPI.    PAST HISTORY     Past Medical History:  Past Medical History:   Diagnosis Date    Anemia     Arthritis     Asthma     Headache     Seizures (HCC)        Past Surgical History:  Past Surgical History:   Procedure Laterality Date    NEUROLOGICAL SURGERY         Family History:  No family history on file.    Social History:  Social History     Tobacco Use    Smoking status: Heavy Smoker     Types: Cigarettes    Smokeless tobacco: Never   Vaping Use    Vaping status: Never Used   Substance Use Topics    Alcohol use: No    Drug use: No       Allergies:  No Known Allergies    CURRENT MEDICATIONS      Current Discharge Medication List        CONTINUE these medications which have NOT CHANGED    Details   vitamin D3 (CHOLECALCIFEROL) 25 MCG (1000 UT) TABS tablet TAKE 1 TABLET BY MOUTH TWICE A DAY  Qty: 180 tablet, Refills: 2      tamsulosin (FLOMAX) 0.4 MG capsule TAKE 1 CAPSULE BY

## 2024-11-21 NOTE — CONSULTS
parameters  Increase Briviact 125 mg BID,   Vimpat 150mg BID   Seizure Precautions:  Do not drive. (this is to include: car, bicycle, or motorcycle)  Do not drive operate dangerous equipment such as power tools, heavy machinery with moving parts, or an open flame.  Do not swim alone (this would include a bathtub full of water is a small swimming     The [facility]'s Seizure Policy states that a person must be seizure-free or blackout-free for at least 6 months  before driving.   I discussed with the hospitalist team    Neurology will sign off.  Please do not hesitate to call with questions or concerns.  Please let us know if we can provide any additional information.    ?      Benny Jackson MD  11/21/2024    ?

## 2024-11-22 VITALS
HEART RATE: 75 BPM | TEMPERATURE: 97.9 F | OXYGEN SATURATION: 95 % | DIASTOLIC BLOOD PRESSURE: 74 MMHG | WEIGHT: 119.05 LBS | HEIGHT: 65 IN | SYSTOLIC BLOOD PRESSURE: 117 MMHG | BODY MASS INDEX: 19.83 KG/M2 | RESPIRATION RATE: 16 BRPM

## 2024-11-22 LAB
ANION GAP SERPL CALC-SCNC: 4 MMOL/L (ref 2–12)
BASOPHILS # BLD: 0 K/UL (ref 0–0.1)
BASOPHILS NFR BLD: 0 % (ref 0–1)
BUN SERPL-MCNC: 11 MG/DL (ref 6–20)
BUN/CREAT SERPL: 14 (ref 12–20)
CALCIUM SERPL-MCNC: 9.1 MG/DL (ref 8.5–10.1)
CHLORIDE SERPL-SCNC: 107 MMOL/L (ref 97–108)
CO2 SERPL-SCNC: 29 MMOL/L (ref 21–32)
CREAT SERPL-MCNC: 0.78 MG/DL (ref 0.55–1.02)
DIFFERENTIAL METHOD BLD: NORMAL
EOSINOPHIL # BLD: 0.2 K/UL (ref 0–0.4)
EOSINOPHIL NFR BLD: 3 % (ref 0–7)
ERYTHROCYTE [DISTWIDTH] IN BLOOD BY AUTOMATED COUNT: 13.7 % (ref 11.5–14.5)
GLUCOSE SERPL-MCNC: 93 MG/DL (ref 65–100)
HCT VFR BLD AUTO: 37.7 % (ref 35–47)
HGB BLD-MCNC: 11.8 G/DL (ref 11.5–16)
IMM GRANULOCYTES # BLD AUTO: 0 K/UL (ref 0–0.04)
IMM GRANULOCYTES NFR BLD AUTO: 0 % (ref 0–0.5)
LYMPHOCYTES # BLD: 1.6 K/UL (ref 0.8–3.5)
LYMPHOCYTES NFR BLD: 23 % (ref 12–49)
MCH RBC QN AUTO: 28 PG (ref 26–34)
MCHC RBC AUTO-ENTMCNC: 31.3 G/DL (ref 30–36.5)
MCV RBC AUTO: 89.5 FL (ref 80–99)
MONOCYTES # BLD: 0.5 K/UL (ref 0–1)
MONOCYTES NFR BLD: 8 % (ref 5–13)
NEUTS SEG # BLD: 4.6 K/UL (ref 1.8–8)
NEUTS SEG NFR BLD: 66 % (ref 32–75)
NRBC # BLD: 0 K/UL (ref 0–0.01)
NRBC BLD-RTO: 0 PER 100 WBC
PLATELET # BLD AUTO: 250 K/UL (ref 150–400)
PMV BLD AUTO: 9 FL (ref 8.9–12.9)
POTASSIUM SERPL-SCNC: 3.6 MMOL/L (ref 3.5–5.1)
RBC # BLD AUTO: 4.21 M/UL (ref 3.8–5.2)
SODIUM SERPL-SCNC: 140 MMOL/L (ref 136–145)
WBC # BLD AUTO: 7 K/UL (ref 3.6–11)

## 2024-11-22 PROCEDURE — 6360000002 HC RX W HCPCS: Performed by: NURSE PRACTITIONER

## 2024-11-22 PROCEDURE — 80048 BASIC METABOLIC PNL TOTAL CA: CPT

## 2024-11-22 PROCEDURE — 36415 COLL VENOUS BLD VENIPUNCTURE: CPT

## 2024-11-22 PROCEDURE — C9254 INJECTION, LACOSAMIDE: HCPCS | Performed by: STUDENT IN AN ORGANIZED HEALTH CARE EDUCATION/TRAINING PROGRAM

## 2024-11-22 PROCEDURE — 6360000002 HC RX W HCPCS: Performed by: STUDENT IN AN ORGANIZED HEALTH CARE EDUCATION/TRAINING PROGRAM

## 2024-11-22 PROCEDURE — 6370000000 HC RX 637 (ALT 250 FOR IP): Performed by: INTERNAL MEDICINE

## 2024-11-22 PROCEDURE — 85025 COMPLETE CBC W/AUTO DIFF WBC: CPT

## 2024-11-22 PROCEDURE — 6370000000 HC RX 637 (ALT 250 FOR IP): Performed by: NURSE PRACTITIONER

## 2024-11-22 PROCEDURE — 2580000003 HC RX 258: Performed by: NURSE PRACTITIONER

## 2024-11-22 RX ADMIN — SODIUM CHLORIDE, PRESERVATIVE FREE 10 ML: 5 INJECTION INTRAVENOUS at 08:35

## 2024-11-22 RX ADMIN — LACOSAMIDE 150 MG: 10 INJECTION INTRAVENOUS at 08:34

## 2024-11-22 RX ADMIN — THIAMINE HYDROCHLORIDE 100 MG: 100 INJECTION, SOLUTION INTRAMUSCULAR; INTRAVENOUS at 08:34

## 2024-11-22 RX ADMIN — ACETAMINOPHEN 650 MG: 325 TABLET ORAL at 10:48

## 2024-11-22 RX ADMIN — FERROUS SULFATE TAB 325 MG (65 MG ELEMENTAL FE) 325 MG: 325 (65 FE) TAB at 08:34

## 2024-11-22 RX ADMIN — APIXABAN 2.5 MG: 2.5 TABLET, FILM COATED ORAL at 08:33

## 2024-11-22 RX ADMIN — BRIVARACETAM 125 MG: 50 TABLET, FILM COATED ORAL at 08:33

## 2024-11-22 RX ADMIN — Medication 1000 UNITS: at 08:34

## 2024-11-22 ASSESSMENT — PAIN DESCRIPTION - DESCRIPTORS: DESCRIPTORS: ACHING

## 2024-11-22 ASSESSMENT — PAIN SCALES - GENERAL
PAINLEVEL_OUTOF10: 4
PAINLEVEL_OUTOF10: 10
PAINLEVEL_OUTOF10: 0

## 2024-11-22 ASSESSMENT — PAIN DESCRIPTION - LOCATION: LOCATION: HEAD

## 2024-11-22 NOTE — CARE COORDINATION
Steps 0 YOSHI   Bathroom Equipment None   Home Equipment None   ADL Assistance Independent   Ambulation Assistance Independent   Transfer Assistance Independent   Active  No   Patient's  Info Daughter and daughter's friend   Discharge Planning   Type of Residence House   Living Arrangements Children   Current Services Prior To Admission None   Potential Assistance Needed N/A   DME Ordered? No   Potential Assistance Purchasing Medications No   Type of Home Care Services None   Patient expects to be discharged to: House   Services At/After Discharge   Transition of Care Consult (CM Consult) Discharge Planning   Services At/After Discharge None   [place] Resource Information Provided? No  (N/a)   Mode of Transport at Discharge Other (see comment)  (Daughter to transport)   Confirm Follow Up Transport Family   Condition of Participation: Discharge Planning   The Plan for Transition of Care is related to the following treatment goals: Home with follow-ups to continue working on strength and mobility goals   The Patient and/or Patient Representative was provided with a Choice of Provider? Patient   The Patient and/Or Patient Representative agree with the Discharge Plan? Yes   Freedom of Choice list was provided with basic dialogue that supports the patient's individualized plan of care/goals, treatment preferences, and shares the quality data associated with the providers?  No  (N/a)         Advance Care Planning     General Advance Care Planning (ACP) Conversation    Date of Conversation: 11/22/2024  Conducted with: Patient with Decision Making Capacity  Other persons present: None    Healthcare Decision Maker:   Primary Decision Maker: Sapna Pérez - Child - [phone]    Secondary Decision Maker: Scot Pérez - Child - [phone]     Today we documented Decision Maker(s) consistent with Legal Next of Kin hierarchy.  Content/Action Overview:  Has NO ACP documents-Information provided  Reviewed DNR/DNI

## 2024-11-22 NOTE — DISCHARGE SUMMARY
cholecalciferol     Paroxysmal A-fib on Eliquis  Consider decreasing dose to 2.5 twice daily given her age and weight    Patient currently remains seizure-free.  Medically stable for discharge.  Ambulating with walker.  Discharge recommendation and need for neurology follow-up discussed with the son Scot in detail and all questions were answered    Discharge Exam:  Patient seen and examined by me on discharge day.  Pertinent Findings:  Patient Vitals for the past 24 hrs:   BP Temp Temp src Pulse Resp SpO2   11/22/24 0827 117/74 97.9 °F (36.6 °C) Oral 75 16 95 %   11/21/24 1935 110/67 97.7 °F (36.5 °C) Oral (!) 104 16 91 %       Gen:    Not in distress  Chest: Clear lungs  CVS:   Regular rhythm.  No edema  Abd:  Soft, not distended, not tender  Neuro: awake, moving all exts    Discharge/Recent Laboratory Results:  Recent Labs     11/22/24  0443      K 3.6      CO2 29   BUN 11   CREATININE 0.78   GLUCOSE 93   CALCIUM 9.1     Recent Labs     11/22/24  0443   HGB 11.8   HCT 37.7   WBC 7.0          Discharge Medications:     Medication List        CHANGE how you take these medications      brivaracetam 25 MG Tabs tablet  Commonly known as: Briviact  Take 5 tablets by mouth 2 times daily for 30 days. Max Daily Amount: 250 mg  What changed:   medication strength  how much to take  how to take this  when to take this  additional instructions  Another medication with the same name was removed. Continue taking this medication, and follow the directions you see here.            CONTINUE taking these medications      acetaminophen 325 MG tablet  Commonly known as: TYLENOL     apixaban 5 MG Tabs tablet  Commonly known as: ELIQUIS     ferrous sulfate 325 (65 Fe) MG tablet  Commonly known as: IRON 325  TAKE 1 TABLET BY MOUTH TWICE A DAY     lacosamide 150 MG Tabs tablet  Commonly known as: VIMPAT     tamsulosin 0.4 MG capsule  Commonly known as: FLOMAX  TAKE 1 CAPSULE BY MOUTH EVERY DAY     vitamin D3 25 MCG

## 2024-11-22 NOTE — PROGRESS NOTES
End of Shift Note    Bedside shift change report given to  Yane CAMPA,  (oncoming nurse) by Carlita Gore RN .        Shift worked:  Nights   Shift summary and any significant changes:     Patient reports feeling well and denies any episodes of seizure overnight       Concerns for physician to address:  None    Zone phone for oncoming shift:   [phone]     Patient Information  Sapna Pérez  79 y.o.  11/20/2024  4:33 PM by Sam Jones DO. Sapna Pérez was admitted from [facility]    Problem List  Patient Active Problem List    Diagnosis Date Noted    Sepsis ([facility]) 07/25/2018    Iron deficiency anemia 07/25/2018    Seizure ([facility]) 07/25/2018     Past Medical History:   Diagnosis Date    Anemia     Arthritis     Asthma     Headache     Seizures ([facility])        Core Measures:  CVA: no  CHF: no  PNA: no    Activity:Level of Assistance: Moderate assist, patient does 50-74%  Number times ambulated in hallways past shift: no  Number of times OOB to chair past shift: one    Cardiac:   Cardiac Monitoring: yes, 014    Access:   Current line(s): PIV    Respiratory:   O2 Device: None (Room air)    GI:     Current diet:  ADULT DIET; Regular  Tolerating current diet: Yes    Pain Management:   Patient states pain is manageable on current regimen: yes    Skin:  Pollo scale: 21  Interventions: turn q2  Pressure injury: no    Patient Safety:  Fall Score: Fagan Total Score: 50  Interventions: bed alarm  Self-release roll belt: No  Dexterity to release roll belt: N/A   (must document dexterity  here by stating Yes or No here, otherwise this is a restraint and must follow restraint documentation policy.)    DVT prophylaxis:  DVT prophylaxis: meds    Active Consults:  IP CONSULT TO PHARMACY    Length of Stay:  Expected LOS: 3  Actual LOS: 1    Carlita Gore RN                             
End of Shift Note    Bedside shift change report given to LOKESH Asher,  (oncoming nurse) by Lj Abreu RN .        Shift worked: Night   Shift summary and any significant changes:    Seizure precaution  No acute changes overnight       Concerns for physician to address:  None    Zone phone for oncoming shift:  [phone]     Patient Information  Sapna Pérez  79 y.o.  11/20/2024  4:33 PM by Sam Jones DO. Sapna Pérez was admitted from [facility]    Problem List  Patient Active Problem List    Diagnosis Date Noted    Acute alteration in mental status 11/21/2024    Sepsis ([facility]) 07/25/2018    Iron deficiency anemia 07/25/2018    Seizure ([facility]) 07/25/2018     Past Medical History:   Diagnosis Date    Anemia     Arthritis     Asthma     Headache     Seizures ([facility])        Core Measures:  CVA: no  CHF: no  PNA: no    Activity:Level of Assistance: Minimal assist, patient does 75% or more  Number times ambulated in hallways past shift: no  Number of times OOB to chair past shift: one    Cardiac:   Cardiac Monitoring: yes, 014    Access:   Current line(s): PIV    Respiratory:   O2 Device: None (Room air)    GI:     Current diet:  ADULT DIET; Regular  Tolerating current diet: Yes    Pain Management:   Patient states pain is manageable on current regimen: yes    Skin:  Pollo scale: 21  Interventions: turn q2  Pressure injury: no    Patient Safety:  Fall Score: Fagan Total Score: 50  Interventions: bed alarm  Self-release roll belt: No  Dexterity to release roll belt: N/A   (must document dexterity  here by stating Yes or No here, otherwise this is a restraint and must follow restraint documentation policy.)    DVT prophylaxis:  DVT prophylaxis: meds    Active Consults:  IP CONSULT TO PHARMACY  IP CONSULT TO NEUROLOGY    Length of Stay:  Expected LOS: 2  Actual LOS: 2    Lj Abreu, RN                             
End of Shift Note    Bedside shift change report given to Lj CAMPA,  (oncoming nurse) by DAISY SANTIAGO RN .        Shift worked:  days   Shift summary and any significant changes:    Seizure precaution       Concerns for physician to address:  None    Zone phone for oncoming shift:   [phone]     Patient Information  Sapna Pérez  79 y.o.  11/20/2024  4:33 PM by Sam Jones DO. Sapna Pérez was admitted from [facility]    Problem List  Patient Active Problem List    Diagnosis Date Noted    Acute alteration in mental status 11/21/2024    Sepsis ([facility]) 07/25/2018    Iron deficiency anemia 07/25/2018    Seizure ([facility]) 07/25/2018     Past Medical History:   Diagnosis Date    Anemia     Arthritis     Asthma     Headache     Seizures ([facility])        Core Measures:  CVA: no  CHF: no  PNA: no    Activity:Level of Assistance: Minimal assist, patient does 75% or more  Number times ambulated in hallways past shift: no  Number of times OOB to chair past shift: one    Cardiac:   Cardiac Monitoring: yes, 014    Access:   Current line(s): PIV    Respiratory:   O2 Device: None (Room air)    GI:     Current diet:  ADULT DIET; Regular  Tolerating current diet: Yes    Pain Management:   Patient states pain is manageable on current regimen: yes    Skin:  Pollo scale: 21  Interventions: turn q2  Pressure injury: no    Patient Safety:  Fall Score: Fagan Total Score: 50  Interventions: bed alarm  Self-release roll belt: No  Dexterity to release roll belt: N/A   (must document dexterity  here by stating Yes or No here, otherwise this is a restraint and must follow restraint documentation policy.)    DVT prophylaxis:  DVT prophylaxis: meds    Active Consults:  IP CONSULT TO PHARMACY  IP CONSULT TO NEUROLOGY    Length of Stay:  Expected LOS: 2  Actual LOS: 1    DAISY SANTIAGO, RN                             
PCP hospital follow-up transitional care appointment has been scheduled with Dr. Kofi Gore on 12/4/24 1030. [facility] information on AVS for patient resource.   Pending patient discharge.     
Pharmacy Medication Reconciliation     The patient was interviewed regarding current PTA medication list, use and drug allergies;  patient present in room and obtained permission from patient to discuss drug regimen with visitor(s) present. The patient was questioned regarding use of any other inhalers, topical products, over the counter medications, herbal medications, vitamin products or ophthalmic/nasal/otic medication use.     Allergy Update: Patient has no known allergies.    Recommendations/Findings:   The following amendments were made to the patient's active medication list on file at [facility]:   1) Additions: N/A    2) Deletions: N/A    3) Changes:   Briviact 100 mg (Patient used to take 50 mg)      Pertinent Findings: Patient was quiet, minimal \"yes\" or \"no\" replies. Asked if she had any issues with any of her medications in the past, she zoned out and had no comments.     Clarified PTA med list with patient. PTA medication list was corrected to the following:     Prior to Admission Medications   Prescriptions Last Dose Informant   Ascorbic Acid 500 MG CHEW Not Taking    Sig: Take 1 tablet by mouth 2 times daily   Patient not taking: Reported on 1/23/2024   Brivaracetam (BRIVIACT) 100 MG TABS tablet 11/21/2024    Sig: Take 1 tablet by mouth 2 times daily. Max Daily Amount: 200 mg   acetaminophen (TYLENOL) 325 MG tablet Past Week    Sig: TAKE 2 TABLETS BY MOUTH EVERY SIX (6) HOURS AS NEEDED FOR PAIN.   apixaban (ELIQUIS) 5 MG TABS tablet Past Week    Sig: Take 1 tablet by mouth 2 times daily   ferrous sulfate (IRON 325) 325 (65 Fe) MG tablet 11/21/2024    Sig: TAKE 1 TABLET BY MOUTH TWICE A DAY   lacosamide (VIMPAT) 150 MG TABS tablet 11/21/2024    Sig: TAKE 1 TABLET BY MOUTH 2 TIMES A DAY. MAX DAILY AMOUNT: 300 MG.   tamsulosin (FLOMAX) 0.4 MG capsule Past Week    Sig: TAKE 1 CAPSULE BY MOUTH EVERY DAY   vitamin D3 (CHOLECALCIFEROL) 25 MCG (1000 UT) TABS tablet 11/21/2024    Sig: TAKE 1 TABLET BY MOUTH TWICE A 
35.7 36.9    252     Recent Labs     11/20/24  1742 11/21/24  0452    140   K 3.4* 3.5    107   CO2 32 31   GLUCOSE 101* 92   BUN 7 7   CREATININE 0.80 0.80   CALCIUM 9.6 9.6   BILITOT 0.5 0.7   AST 8* 9*   ALT 10* 10*       Signed: Erlinda Manzanares MD

## 2024-11-22 NOTE — PLAN OF CARE
Problem: Safety - Adult  Goal: Free from fall injury  11/21/2024 1918 by Yane Akins RN  Flowsheets (Taken 11/21/2024 1918)  Free From Fall Injury:   Instruct family/caregiver on patient safety   Based on caregiver fall risk screen, instruct family/caregiver to ask for assistance with transferring infant if caregiver noted to have fall risk factors  11/21/2024 1854 by Yane Akins, RN  Flowsheets (Taken 11/21/2024 1854)  Free From Fall Injury:   Instruct family/caregiver on patient safety   Based on caregiver fall risk screen, instruct family/caregiver to ask for assistance with transferring infant if caregiver noted to have fall risk factors

## 2024-11-22 NOTE — PLAN OF CARE
Problem: Discharge Planning  Goal: Discharge to home or other facility with appropriate resources  11/22/2024 1000 by Karol Koo RN  Outcome: Progressing  11/21/2024 2141 by Lj Abreu RN  Outcome: Progressing     Problem: Skin/Tissue Integrity  Goal: Absence of new skin breakdown  Description: 1.  Monitor for areas of redness and/or skin breakdown  2.  Assess vascular access sites hourly  3.  Every 4-6 hours minimum:  Change oxygen saturation probe site  4.  Every 4-6 hours:  If on nasal continuous positive airway pressure, respiratory therapy assess nares and determine need for appliance change or resting period.  11/22/2024 1000 by Karol Koo RN  Outcome: Progressing  11/21/2024 2141 by Lj Abreu RN  Outcome: Progressing     Problem: Safety - Adult  Goal: Free from fall injury  11/22/2024 1000 by Karol Koo RN  Outcome: Progressing  11/21/2024 2141 by Lj Abreu RN  Outcome: Progressing     Problem: Pain  Goal: Verbalizes/displays adequate comfort level or baseline comfort level  11/22/2024 1000 by Karol Koo RN  Outcome: Progressing  11/21/2024 2141 by Lj Abreu RN  Outcome: Progressing     Problem: Neurosensory - Adult  Goal: Achieves stable or improved neurological status  11/22/2024 1000 by Karol Koo RN  Outcome: Progressing  11/21/2024 2141 by Lj Abreu RN  Outcome: Progressing  Goal: Absence of seizures  11/22/2024 1000 by Karol Koo RN  Outcome: Progressing  11/21/2024 2141 by Lj Abreu RN  Outcome: Progressing  Goal: Remains free of injury related to seizures activity  11/22/2024 1000 by Karol Koo RN  Outcome: Progressing  11/21/2024 2141 by Lj Abreu RN  Outcome: Progressing  Goal: Achieves maximal functionality and self care  11/22/2024 1000 by Karol Koo RN  Outcome: Progressing  11/21/2024 2141 by Lj Abreu RN  Outcome: Progressing

## 2024-11-23 LAB — LACOSAMIDE SERPL-MCNC: <0.5 UG/ML (ref 5–10)

## 2024-11-25 ENCOUNTER — TELEPHONE (OUTPATIENT)
Facility: CLINIC | Age: 79
End: 2024-11-25

## 2024-11-25 NOTE — TELEPHONE ENCOUNTER
Care Transitions Initial Follow Up Call    Outreach made within 2 business days of discharge: Yes    Patient: Sapna Pérez Patient : 1945   MRN: [id number]  Reason for Admission: SEIZURES  Discharge Date: 24       Spoke with: NO ANSWER NUMBER NOT IN SERVICE.    Discharge department/facility: [facility].      Kamar Méndez LPN

## 2024-11-25 NOTE — TELEPHONE ENCOUNTER
Care Transitions Initial Follow Up Call    Outreach made within 2 business days of discharge: Yes    Patient: Sapna Pérez Patient : 1945   MRN: [id number]  Reason for Admission: SEIZURE  Discharge Date: 24       Spoke with: NUMBER NOT IN SERVICE      Kamar Méndez LPN

## 2024-12-02 ENCOUNTER — TELEPHONE (OUTPATIENT)
Facility: CLINIC | Age: 79
End: 2024-12-02

## 2024-12-02 NOTE — TELEPHONE ENCOUNTER
Corina, with [facility] @ [place] called to see if Green will sign a plan of care for discharge from [facility].  Corina was advised that, pt's last OV was 1/23/24.  Corina can be reached @ [phone].

## 2025-03-03 ENCOUNTER — HOSPITAL ENCOUNTER (EMERGENCY)
Facility: HOSPITAL | Age: 80
Discharge: HOME | End: 2025-03-04
Attending: STUDENT IN AN ORGANIZED HEALTH CARE EDUCATION/TRAINING PROGRAM
Payer: MEDICARE

## 2025-03-03 ENCOUNTER — APPOINTMENT (OUTPATIENT)
Facility: HOSPITAL | Age: 80
End: 2025-03-03
Payer: MEDICARE

## 2025-03-03 DIAGNOSIS — R07.9 CHEST PAIN, UNSPECIFIED TYPE: Primary | ICD-10-CM

## 2025-03-03 PROCEDURE — 83735 ASSAY OF MAGNESIUM: CPT

## 2025-03-03 PROCEDURE — 80053 COMPREHEN METABOLIC PANEL: CPT

## 2025-03-03 PROCEDURE — 93005 ELECTROCARDIOGRAM TRACING: CPT | Performed by: STUDENT IN AN ORGANIZED HEALTH CARE EDUCATION/TRAINING PROGRAM

## 2025-03-03 PROCEDURE — 83690 ASSAY OF LIPASE: CPT

## 2025-03-03 PROCEDURE — 84484 ASSAY OF TROPONIN QUANT: CPT

## 2025-03-03 PROCEDURE — 71045 X-RAY EXAM CHEST 1 VIEW: CPT

## 2025-03-03 PROCEDURE — 6370000000 HC RX 637 (ALT 250 FOR IP): Performed by: STUDENT IN AN ORGANIZED HEALTH CARE EDUCATION/TRAINING PROGRAM

## 2025-03-03 PROCEDURE — 99285 EMERGENCY DEPT VISIT HI MDM: CPT

## 2025-03-03 PROCEDURE — 36415 COLL VENOUS BLD VENIPUNCTURE: CPT

## 2025-03-03 PROCEDURE — 85025 COMPLETE CBC W/AUTO DIFF WBC: CPT

## 2025-03-03 RX ORDER — ASPIRIN 81 MG/1
324 TABLET, CHEWABLE ORAL
Status: COMPLETED | OUTPATIENT
Start: 2025-03-03 | End: 2025-03-03

## 2025-03-03 RX ORDER — ASPIRIN 81 MG/1
324 TABLET, CHEWABLE ORAL DAILY
Status: DISCONTINUED | OUTPATIENT
Start: 2025-03-04 | End: 2025-03-03

## 2025-03-03 RX ADMIN — ASPIRIN 324 MG: 81 TABLET, CHEWABLE ORAL at 23:38

## 2025-03-03 ASSESSMENT — PAIN SCALES - GENERAL: PAINLEVEL_OUTOF10: 9

## 2025-03-03 ASSESSMENT — LIFESTYLE VARIABLES
HOW OFTEN DO YOU HAVE A DRINK CONTAINING ALCOHOL: NEVER
HOW MANY STANDARD DRINKS CONTAINING ALCOHOL DO YOU HAVE ON A TYPICAL DAY: PATIENT DOES NOT DRINK

## 2025-03-03 ASSESSMENT — PAIN DESCRIPTION - LOCATION: LOCATION: CHEST

## 2025-03-04 ENCOUNTER — APPOINTMENT (OUTPATIENT)
Facility: HOSPITAL | Age: 80
End: 2025-03-04
Payer: MEDICARE

## 2025-03-04 VITALS
BODY MASS INDEX: 19.81 KG/M2 | DIASTOLIC BLOOD PRESSURE: 82 MMHG | HEART RATE: 69 BPM | HEIGHT: 65 IN | SYSTOLIC BLOOD PRESSURE: 139 MMHG | TEMPERATURE: 97.9 F | OXYGEN SATURATION: 99 % | RESPIRATION RATE: 17 BRPM

## 2025-03-04 LAB
ALBUMIN SERPL-MCNC: 3.4 G/DL (ref 3.5–5)
ALBUMIN/GLOB SERPL: 1 (ref 1.1–2.2)
ALP SERPL-CCNC: 102 U/L (ref 45–117)
ALT SERPL-CCNC: 10 U/L (ref 12–78)
ANION GAP SERPL CALC-SCNC: 3 MMOL/L (ref 2–12)
AST SERPL-CCNC: 8 U/L (ref 15–37)
BASOPHILS # BLD: 0.03 K/UL (ref 0–0.1)
BASOPHILS NFR BLD: 0.4 % (ref 0–1)
BILIRUB SERPL-MCNC: 0.4 MG/DL (ref 0.2–1)
BUN SERPL-MCNC: 6 MG/DL (ref 6–20)
BUN/CREAT SERPL: 7 (ref 12–20)
CALCIUM SERPL-MCNC: 9.6 MG/DL (ref 8.5–10.1)
CHLORIDE SERPL-SCNC: 106 MMOL/L (ref 97–108)
CO2 SERPL-SCNC: 31 MMOL/L (ref 21–32)
CREAT SERPL-MCNC: 0.88 MG/DL (ref 0.55–1.02)
DIFFERENTIAL METHOD BLD: ABNORMAL
EKG ATRIAL RATE: 63 BPM
EKG DIAGNOSIS: NORMAL
EKG P AXIS: 61 DEGREES
EKG P-R INTERVAL: 146 MS
EKG Q-T INTERVAL: 412 MS
EKG QRS DURATION: 76 MS
EKG QTC CALCULATION (BAZETT): 421 MS
EKG R AXIS: -35 DEGREES
EKG T AXIS: -36 DEGREES
EKG VENTRICULAR RATE: 63 BPM
EOSINOPHIL # BLD: 0.08 K/UL (ref 0–0.4)
EOSINOPHIL NFR BLD: 1.1 % (ref 0–7)
ERYTHROCYTE [DISTWIDTH] IN BLOOD BY AUTOMATED COUNT: 14.3 % (ref 11.5–14.5)
GLOBULIN SER CALC-MCNC: 3.5 G/DL (ref 2–4)
GLUCOSE SERPL-MCNC: 104 MG/DL (ref 65–100)
HCT VFR BLD AUTO: 35.9 % (ref 35–47)
HGB BLD-MCNC: 11.3 G/DL (ref 11.5–16)
IMM GRANULOCYTES # BLD AUTO: 0.02 K/UL (ref 0–0.04)
IMM GRANULOCYTES NFR BLD AUTO: 0.3 % (ref 0–0.5)
LIPASE SERPL-CCNC: 13 U/L (ref 13–75)
LYMPHOCYTES # BLD: 2 K/UL (ref 0.8–3.5)
LYMPHOCYTES NFR BLD: 28.6 % (ref 12–49)
MAGNESIUM SERPL-MCNC: 2 MG/DL (ref 1.6–2.4)
MCH RBC QN AUTO: 28.3 PG (ref 26–34)
MCHC RBC AUTO-ENTMCNC: 31.5 G/DL (ref 30–36.5)
MCV RBC AUTO: 89.8 FL (ref 80–99)
MONOCYTES # BLD: 0.52 K/UL (ref 0–1)
MONOCYTES NFR BLD: 7.4 % (ref 5–13)
NEUTS SEG # BLD: 4.34 K/UL (ref 1.8–8)
NEUTS SEG NFR BLD: 62.2 % (ref 32–75)
NRBC # BLD: 0 K/UL (ref 0–0.01)
NRBC BLD-RTO: 0 PER 100 WBC
PLATELET # BLD AUTO: 296 K/UL (ref 150–400)
PMV BLD AUTO: 9 FL (ref 8.9–12.9)
POTASSIUM SERPL-SCNC: 3.8 MMOL/L (ref 3.5–5.1)
PROT SERPL-MCNC: 6.9 G/DL (ref 6.4–8.2)
RBC # BLD AUTO: 4 M/UL (ref 3.8–5.2)
SODIUM SERPL-SCNC: 140 MMOL/L (ref 136–145)
TROPONIN I SERPL HS-MCNC: 4 NG/L (ref 0–51)
TROPONIN I SERPL HS-MCNC: 5 NG/L (ref 0–51)
WBC # BLD AUTO: 7 K/UL (ref 3.6–11)

## 2025-03-04 PROCEDURE — 71275 CT ANGIOGRAPHY CHEST: CPT

## 2025-03-04 PROCEDURE — 6360000004 HC RX CONTRAST MEDICATION: Performed by: STUDENT IN AN ORGANIZED HEALTH CARE EDUCATION/TRAINING PROGRAM

## 2025-03-04 RX ORDER — IOPAMIDOL 755 MG/ML
100 INJECTION, SOLUTION INTRAVASCULAR
Status: COMPLETED | OUTPATIENT
Start: 2025-03-04 | End: 2025-03-04

## 2025-03-04 RX ORDER — LIDOCAINE 50 MG/G
1 PATCH TOPICAL DAILY
Qty: 10 PATCH | Refills: 0 | Status: SHIPPED | OUTPATIENT
Start: 2025-03-04 | End: 2025-03-14

## 2025-03-04 RX ORDER — METHOCARBAMOL 750 MG/1
750 TABLET, FILM COATED ORAL 4 TIMES DAILY
Qty: 40 TABLET | Refills: 0 | Status: SHIPPED | OUTPATIENT
Start: 2025-03-04 | End: 2025-03-14

## 2025-03-04 RX ADMIN — IOPAMIDOL 60 ML: 755 INJECTION, SOLUTION INTRAVENOUS at 02:19

## 2025-03-04 ASSESSMENT — HEART SCORE: ECG: NON-SPECIFC REPOLARIZATION DISTURBANCE/LBTB/PM

## 2025-03-04 ASSESSMENT — PAIN SCALES - GENERAL: PAINLEVEL_OUTOF10: 8

## 2025-03-04 NOTE — ED PROVIDER NOTES
[facility] EMERGENCY DEPARTMENT  EMERGENCY DEPARTMENT ENCOUNTER       Pt Name: Sapna Pérez  MRN: [id number]  Birthdate 1945  Date of evaluation: 3/3/2025  Provider: Jevon Ryan MD   PCP: Kofi Gore Jr., MD  Note Started: 2:01 AM 3/4/25     CHIEF COMPLAINT       Chief Complaint   Patient presents with    Chest Pain     Pt wheeled into triage with cc of mid sternal chest pain since this 530pm.     Shortness of Breath     Pt also reporting shortness of breath         HISTORY OF PRESENT ILLNESS: 1 or more elements      History From: Patient  None     Sapna Pérez is a 80 y.o. female who presents to the emergency department with midsternal chest pain which began around 5:30 PM this evening.  In triage patient reported some shortness of breath but she is denying this currently.  Patient states pain has been constant with no modifying factors.  Pain not reliably worsened by exertion or deep respiration.  Patient denies prior history of CAD, hypertension, hyperlipidemia, diabetes, smoking.  Denies family history of cardiac disease.     Nursing Notes were all reviewed and agreed with or any disagreements were addressed in the HPI.     REVIEW OF SYSTEMS      Review of Systems     Positives and Pertinent negatives as per HPI.    PAST HISTORY     Past Medical History:  Past Medical History:   Diagnosis Date    Anemia     Arthritis     Asthma     Headache     Seizures ([facility])          Past Surgical History:  Past Surgical History:   Procedure Laterality Date    NEUROLOGICAL SURGERY         Family History:  No family history on file.    Social History:  Social History     Tobacco Use    Smoking status: Heavy Smoker     Types: Cigarettes    Smokeless tobacco: Never   Vaping Use    Vaping status: Never Used   Substance Use Topics    Alcohol use: No    Drug use: No       Allergies:  No Known Allergies    CURRENT MEDICATIONS      Previous Medications    ACETAMINOPHEN (TYLENOL) 325 MG TABLET    TAKE 2 TABLETS

## 2025-06-10 ENCOUNTER — APPOINTMENT (OUTPATIENT)
Facility: HOSPITAL | Age: 80
End: 2025-06-10
Payer: MEDICARE

## 2025-06-10 ENCOUNTER — HOSPITAL ENCOUNTER (EMERGENCY)
Facility: HOSPITAL | Age: 80
Discharge: HOME | End: 2025-06-10
Attending: EMERGENCY MEDICINE
Payer: MEDICARE

## 2025-06-10 VITALS
DIASTOLIC BLOOD PRESSURE: 98 MMHG | TEMPERATURE: 98.1 F | SYSTOLIC BLOOD PRESSURE: 148 MMHG | OXYGEN SATURATION: 94 % | RESPIRATION RATE: 15 BRPM | HEART RATE: 78 BPM

## 2025-06-10 DIAGNOSIS — G40.919 BREAKTHROUGH SEIZURE (HCC): Primary | ICD-10-CM

## 2025-06-10 LAB
ALBUMIN SERPL-MCNC: 3 G/DL (ref 3.5–5)
ALBUMIN/GLOB SERPL: 0.8 (ref 1.1–2.2)
ALP SERPL-CCNC: 104 U/L (ref 45–117)
ALT SERPL-CCNC: 7 U/L (ref 12–78)
ANION GAP SERPL CALC-SCNC: 3 MMOL/L (ref 2–12)
APPEARANCE UR: CLEAR
AST SERPL-CCNC: 7 U/L (ref 15–37)
BACTERIA URNS QL MICRO: ABNORMAL /HPF
BASOPHILS # BLD: 0.04 K/UL (ref 0–0.1)
BASOPHILS NFR BLD: 0.5 % (ref 0–1)
BILIRUB SERPL-MCNC: 0.2 MG/DL (ref 0.2–1)
BILIRUB UR QL: NEGATIVE
BUN SERPL-MCNC: 16 MG/DL (ref 6–20)
BUN/CREAT SERPL: 19 (ref 12–20)
CALCIUM SERPL-MCNC: 9.5 MG/DL (ref 8.5–10.1)
CHLORIDE SERPL-SCNC: 104 MMOL/L (ref 97–108)
CO2 SERPL-SCNC: 33 MMOL/L (ref 21–32)
COLOR UR: ABNORMAL
CREAT SERPL-MCNC: 0.86 MG/DL (ref 0.55–1.02)
DIFFERENTIAL METHOD BLD: ABNORMAL
EKG ATRIAL RATE: 80 BPM
EKG DIAGNOSIS: NORMAL
EKG P AXIS: 51 DEGREES
EKG P-R INTERVAL: 152 MS
EKG Q-T INTERVAL: 364 MS
EKG QRS DURATION: 78 MS
EKG QTC CALCULATION (BAZETT): 419 MS
EKG R AXIS: -42 DEGREES
EKG T AXIS: -11 DEGREES
EKG VENTRICULAR RATE: 80 BPM
EOSINOPHIL # BLD: 0.14 K/UL (ref 0–0.4)
EOSINOPHIL NFR BLD: 1.9 % (ref 0–7)
EPITH CASTS URNS QL MICRO: ABNORMAL /LPF
ERYTHROCYTE [DISTWIDTH] IN BLOOD BY AUTOMATED COUNT: 14.3 % (ref 11.5–14.5)
GLOBULIN SER CALC-MCNC: 3.8 G/DL (ref 2–4)
GLUCOSE SERPL-MCNC: 93 MG/DL (ref 65–100)
GLUCOSE UR STRIP.AUTO-MCNC: NEGATIVE MG/DL
HCT VFR BLD AUTO: 34.4 % (ref 35–47)
HGB BLD-MCNC: 11 G/DL (ref 11.5–16)
HGB UR QL STRIP: NEGATIVE
IMM GRANULOCYTES # BLD AUTO: 0.02 K/UL (ref 0–0.04)
IMM GRANULOCYTES NFR BLD AUTO: 0.3 % (ref 0–0.5)
KETONES UR QL STRIP.AUTO: ABNORMAL MG/DL
LEUKOCYTE ESTERASE UR QL STRIP.AUTO: ABNORMAL
LYMPHOCYTES # BLD: 1.91 K/UL (ref 0.8–3.5)
LYMPHOCYTES NFR BLD: 25.5 % (ref 12–49)
MAGNESIUM SERPL-MCNC: 2 MG/DL (ref 1.6–2.4)
MCH RBC QN AUTO: 28.9 PG (ref 26–34)
MCHC RBC AUTO-ENTMCNC: 32 G/DL (ref 30–36.5)
MCV RBC AUTO: 90.5 FL (ref 80–99)
MONOCYTES # BLD: 0.53 K/UL (ref 0–1)
MONOCYTES NFR BLD: 7.1 % (ref 5–13)
NEUTS SEG # BLD: 4.85 K/UL (ref 1.8–8)
NEUTS SEG NFR BLD: 64.7 % (ref 32–75)
NITRITE UR QL STRIP.AUTO: NEGATIVE
NRBC # BLD: 0 K/UL (ref 0–0.01)
NRBC BLD-RTO: 0 PER 100 WBC
PH UR STRIP: 6.5 (ref 5–8)
PLATELET # BLD AUTO: 269 K/UL (ref 150–400)
PMV BLD AUTO: 8.6 FL (ref 8.9–12.9)
POTASSIUM SERPL-SCNC: 4 MMOL/L (ref 3.5–5.1)
PROT SERPL-MCNC: 6.8 G/DL (ref 6.4–8.2)
PROT UR STRIP-MCNC: NEGATIVE MG/DL
RBC # BLD AUTO: 3.8 M/UL (ref 3.8–5.2)
RBC #/AREA URNS HPF: ABNORMAL /HPF (ref 0–5)
SODIUM SERPL-SCNC: 140 MMOL/L (ref 136–145)
SP GR UR REFRACTOMETRY: 1.02
URINE CULTURE IF INDICATED: ABNORMAL
UROBILINOGEN UR QL STRIP.AUTO: 1 EU/DL (ref 0.2–1)
WBC # BLD AUTO: 7.5 K/UL (ref 3.6–11)
WBC URNS QL MICRO: ABNORMAL /HPF (ref 0–4)

## 2025-06-10 PROCEDURE — 83735 ASSAY OF MAGNESIUM: CPT

## 2025-06-10 PROCEDURE — 85025 COMPLETE CBC W/AUTO DIFF WBC: CPT

## 2025-06-10 PROCEDURE — 80053 COMPREHEN METABOLIC PANEL: CPT

## 2025-06-10 PROCEDURE — 71045 X-RAY EXAM CHEST 1 VIEW: CPT

## 2025-06-10 PROCEDURE — 87086 URINE CULTURE/COLONY COUNT: CPT

## 2025-06-10 PROCEDURE — 81001 URINALYSIS AUTO W/SCOPE: CPT

## 2025-06-10 PROCEDURE — 70450 CT HEAD/BRAIN W/O DYE: CPT

## 2025-06-10 PROCEDURE — 80235 DRUG ASSAY LACOSAMIDE: CPT

## 2025-06-10 PROCEDURE — 99285 EMERGENCY DEPT VISIT HI MDM: CPT

## 2025-06-10 PROCEDURE — 93005 ELECTROCARDIOGRAM TRACING: CPT | Performed by: EMERGENCY MEDICINE

## 2025-06-10 ASSESSMENT — PAIN DESCRIPTION - DESCRIPTORS: DESCRIPTORS: ACHING

## 2025-06-10 ASSESSMENT — PAIN DESCRIPTION - ORIENTATION: ORIENTATION: RIGHT;LEFT

## 2025-06-10 ASSESSMENT — PAIN DESCRIPTION - LOCATION: LOCATION: HEAD

## 2025-06-10 ASSESSMENT — PAIN SCALES - GENERAL: PAINLEVEL_OUTOF10: 10

## 2025-06-10 ASSESSMENT — PAIN - FUNCTIONAL ASSESSMENT: PAIN_FUNCTIONAL_ASSESSMENT: 0-10

## 2025-06-10 NOTE — ED TRIAGE NOTES
Pt reports headache x 2 days ever since she lost her glasses. Pt arrives via EMS, family was concerned for seizure, but pt was only shaking left arm when EMS arrived at home. No shaking at this time.   
Pt. received semisupine in bed. No acute distress. Patient agreed to evaluation from Occupational Therapist. +Heplock, +Tele

## 2025-06-10 NOTE — ED NOTES
Verbal shift change report given to Yue RN (oncoming nurse) by Ashley RN (offgoing nurse). Report included the following information Nurse Handoff Report, ED SBAR, MAR, and Recent Results.

## 2025-06-10 NOTE — ED NOTES
Pt presents to ED via EMS c/o headache x 2 days since losing her glasses. Pt family concern for seizure, d/t pt only shaking left arm. No shaking at this time. Pt denies SOB or chest pain. Pt denies vision changes or dizziness. Pt is alert and oriented x 4, RR even and unlabored, skin is warm and dry. Assessment completed and pt updated on plan of care.  Call bell in reach.        Emergency Department Nursing Plan of Care       The Nursing Plan of Care is developed from the Nursing assessment and Emergency Department Attending provider initial evaluation.  The plan of care may be reviewed in the “ED Provider note”.    The Plan of Care was developed with the following considerations:   Patient / Family readiness to learn indicated by:verbalized understanding  Persons(s) to be included in education: patient  Barriers to Learning/Limitations:None    Signed

## 2025-06-10 NOTE — ED PROVIDER NOTES
to follow-up with her neurologist at [facility].    Most history and information obtained by her daughter, Marcella Pérez, at [phone].    I personally reviewed [facility] neurology office visit records from March 2025 which details her history of epilepsy and breakthrough seizures on Vimpat and Briviact    This patient's case will be signed out to my colleague, Dr. Pemberton, pending labs/UA/CXR.  Patient's disposition is likely home pending normal workup and no recurrent seizures.  Their help is greatly appreciated, but it should be noted that I will be the primary provider of record for this patient's ED encounter.  Jhoan Mike MD        ED Course as of 06/11/25 1010   Tue Lalo 10, 2025   1737 EKG per my interpretation normal sinus rhythm, rate of 80 bpm, sinus arrhythmia present, nonspecific T wave abnormality, no ST segment changes. [AK]   1738 CT head per my independent interpretation no acute process such as bleed, tumor, mass, or midline shift. [AK]   2018 Patient signed out to myself pending urinalysis, imaging.  X-ray of chest is unremarkable, urinalysis with small leukocyte esterase however without pyuria, negative nitrite, few epithelial cells and 1+ bacteria, I have a low suspicion of acute cystitis, will evaluate patient, assess for symptoms.  If she is symptomatic, consider treatment if she is asymptomatic out on urine culture and have patient follow-up outpatient [WB]   2021 I did reevaluate patient.  She has had urinary frequency without urgency or dysuria, may add on urine culture and have patient follow-up however I have a low suspicion of acute infectious cystitis.  Will call daughter for update [WB]      ED Course User Index  [AK] Jhoan Mike MD  [WB] Soto Pemberton MD         Cardiac Monitoring:  The cardiac monitor revealed the following rhythm as interpreted by me: Normal Sinus Rhythm, rate 75 bpm  The cardiac monitor was ordered secondary to the patient's reported complaint of headache and seizures

## 2025-06-11 LAB
BACTERIA SPEC CULT: ABNORMAL
CC UR VC: ABNORMAL
SERVICE CMNT-IMP: ABNORMAL

## 2025-06-11 NOTE — DISCHARGE INSTRUCTIONS
Thank You!    It was a pleasure taking care of you in our Emergency Department today. We know that when you come to our Emergency Department, you are entrusting us with your health, comfort, and safety. Our physicians and nurses honor that trust, and truly appreciate the opportunity to care for you and your loved ones.      We also value your feedback. If you receive a survey about your Emergency Department experience today, please fill it out.  We care about our patients' feedback, and we listen to what you have to say.  Thank you.    Soto Pemberton MD  ________________________________________________________________________  I have included a copy of your lab results and/or radiologic studies from today's visit so you can have them easily available at your follow-up visit. We hope you feel better and please do not hesitate to contact the ED if you have any questions at all!    Recent Results (from the past 12 hours)   EKG 12 Lead (Syncope)    Collection Time: 06/10/25  5:20 PM   Result Value Ref Range    Ventricular Rate 80 BPM    Atrial Rate 80 BPM    P-R Interval 152 ms    QRS Duration 78 ms    Q-T Interval 364 ms    QTc Calculation (Bazett) 419 ms    P Axis 51 degrees    R Axis -42 degrees    T Axis -11 degrees    Diagnosis       Sinus rhythm with marked sinus arrhythmia  Left axis deviation  Nonspecific T wave abnormality  Abnormal ECG  When compared with ECG of 03-MAR-2025 20:00,  No significant change was found  Confirmed by MD Rashid, Jhoan ([id number]) on 6/10/2025 6:19:06 PM     CBC with Auto Differential    Collection Time: 06/10/25  6:08 PM   Result Value Ref Range    WBC 7.5 3.6 - 11.0 K/uL    RBC 3.80 3.80 - 5.20 M/uL    Hemoglobin 11.0 (L) 11.5 - 16.0 g/dL    Hematocrit 34.4 (L) 35.0 - 47.0 %    MCV 90.5 80.0 - 99.0 FL    MCH 28.9 26.0 - 34.0 PG    MCHC 32.0 30.0 - 36.5 g/dL    RDW 14.3 11.5 - 14.5 %    Platelets 269 150 - 400 K/uL    MPV 8.6 (L) 8.9 - 12.9 FL    Nucleated RBCs 0.0 0  WBC    nRBC

## 2025-06-11 NOTE — ED NOTES
Discharge instructions were given to the patient by Yue CAMPA.     The patient left the Emergency Department alert and oriented and in no acute distress with 0 prescriptions. The patient was encouraged to call or return to the ED for worsening issues or problems and was encouraged to schedule a follow up appointment for continuing care.     Ambulation assessment completed before discharge.  Pt left Emergency Department ambulating at baseline with no ortho devices  Ortho device education: none    The patient verbalized understanding of discharge instructions and prescriptions, all questions were answered. The patient has no further concerns at this time.

## 2025-06-12 ENCOUNTER — RESULTS FOLLOW-UP (OUTPATIENT)
Facility: HOSPITAL | Age: 80
End: 2025-06-12

## 2025-06-13 LAB — LACOSAMIDE SERPL-MCNC: 16.7 UG/ML (ref 5–10)
